# Patient Record
Sex: FEMALE | Race: WHITE | NOT HISPANIC OR LATINO | Employment: FULL TIME | URBAN - METROPOLITAN AREA
[De-identification: names, ages, dates, MRNs, and addresses within clinical notes are randomized per-mention and may not be internally consistent; named-entity substitution may affect disease eponyms.]

---

## 2019-06-11 ENCOUNTER — OFFICE VISIT (OUTPATIENT)
Dept: URGENT CARE | Age: 29
End: 2019-06-11
Payer: COMMERCIAL

## 2019-06-11 ENCOUNTER — APPOINTMENT (OUTPATIENT)
Dept: RADIOLOGY | Age: 29
End: 2019-06-11
Attending: FAMILY MEDICINE
Payer: COMMERCIAL

## 2019-06-11 ENCOUNTER — TELEPHONE (OUTPATIENT)
Dept: URGENT CARE | Age: 29
End: 2019-06-11

## 2019-06-11 ENCOUNTER — TRANSCRIBE ORDERS (OUTPATIENT)
Dept: URGENT CARE | Age: 29
End: 2019-06-11

## 2019-06-11 VITALS
TEMPERATURE: 98 F | HEIGHT: 67 IN | BODY MASS INDEX: 28.25 KG/M2 | DIASTOLIC BLOOD PRESSURE: 75 MMHG | HEART RATE: 106 BPM | RESPIRATION RATE: 18 BRPM | OXYGEN SATURATION: 97 % | SYSTOLIC BLOOD PRESSURE: 120 MMHG | WEIGHT: 180 LBS

## 2019-06-11 DIAGNOSIS — S39.92XA INJURY OF COCCYX, INITIAL ENCOUNTER: Primary | ICD-10-CM

## 2019-06-11 DIAGNOSIS — S39.92XA INJURY OF COCCYX, INITIAL ENCOUNTER: ICD-10-CM

## 2019-06-11 PROCEDURE — 99213 OFFICE O/P EST LOW 20 MIN: CPT | Performed by: FAMILY MEDICINE

## 2019-06-11 PROCEDURE — 72220 X-RAY EXAM SACRUM TAILBONE: CPT

## 2019-06-11 RX ORDER — NAPROXEN 500 MG/1
500 TABLET ORAL 2 TIMES DAILY WITH MEALS
Qty: 30 TABLET | Refills: 0 | Status: SHIPPED | OUTPATIENT
Start: 2019-06-11 | End: 2020-09-16

## 2020-06-23 PROBLEM — Z00.00 WELL ADULT EXAM: Status: ACTIVE | Noted: 2020-06-23

## 2020-10-01 ENCOUNTER — OFFICE VISIT (OUTPATIENT)
Dept: FAMILY MEDICINE CLINIC | Facility: CLINIC | Age: 30
End: 2020-10-01
Payer: COMMERCIAL

## 2020-10-01 VITALS
RESPIRATION RATE: 16 BRPM | TEMPERATURE: 98.5 F | HEIGHT: 67 IN | WEIGHT: 160 LBS | OXYGEN SATURATION: 96 % | SYSTOLIC BLOOD PRESSURE: 118 MMHG | HEART RATE: 78 BPM | DIASTOLIC BLOOD PRESSURE: 76 MMHG | BODY MASS INDEX: 25.11 KG/M2

## 2020-10-01 DIAGNOSIS — J02.9 SORE THROAT: ICD-10-CM

## 2020-10-01 DIAGNOSIS — G43.011 MIGRAINE WITHOUT AURA, WITH INTRACTABLE MIGRAINE, SO STATED, WITH STATUS MIGRAINOSUS: ICD-10-CM

## 2020-10-01 DIAGNOSIS — Z11.4 ENCOUNTER FOR SCREENING FOR HIV: ICD-10-CM

## 2020-10-01 DIAGNOSIS — Z13.220 LIPID SCREENING: ICD-10-CM

## 2020-10-01 DIAGNOSIS — Z13.1 DIABETES MELLITUS SCREENING: ICD-10-CM

## 2020-10-01 DIAGNOSIS — F41.1 ANXIETY STATE: ICD-10-CM

## 2020-10-01 DIAGNOSIS — M79.641 RIGHT HAND PAIN: Primary | ICD-10-CM

## 2020-10-01 PROCEDURE — 99214 OFFICE O/P EST MOD 30 MIN: CPT | Performed by: PHYSICIAN ASSISTANT

## 2020-10-01 PROCEDURE — 3725F SCREEN DEPRESSION PERFORMED: CPT | Performed by: PHYSICIAN ASSISTANT

## 2020-10-07 ENCOUNTER — EVALUATION (OUTPATIENT)
Dept: PHYSICAL THERAPY | Facility: REHABILITATION | Age: 30
End: 2020-10-07
Payer: COMMERCIAL

## 2020-10-07 DIAGNOSIS — M79.641 RIGHT HAND PAIN: ICD-10-CM

## 2020-10-07 DIAGNOSIS — G43.011 MIGRAINE WITHOUT AURA, WITH INTRACTABLE MIGRAINE, SO STATED, WITH STATUS MIGRAINOSUS: ICD-10-CM

## 2020-10-07 PROCEDURE — 97162 PT EVAL MOD COMPLEX 30 MIN: CPT | Performed by: PHYSICAL THERAPIST

## 2020-10-07 PROCEDURE — 97110 THERAPEUTIC EXERCISES: CPT | Performed by: PHYSICAL THERAPIST

## 2020-10-12 ENCOUNTER — OFFICE VISIT (OUTPATIENT)
Dept: PHYSICAL THERAPY | Facility: REHABILITATION | Age: 30
End: 2020-10-12
Payer: COMMERCIAL

## 2020-10-12 DIAGNOSIS — G43.011 MIGRAINE WITHOUT AURA, WITH INTRACTABLE MIGRAINE, SO STATED, WITH STATUS MIGRAINOSUS: ICD-10-CM

## 2020-10-12 DIAGNOSIS — M79.641 RIGHT HAND PAIN: Primary | ICD-10-CM

## 2020-10-12 PROCEDURE — 97110 THERAPEUTIC EXERCISES: CPT

## 2020-10-12 PROCEDURE — 97140 MANUAL THERAPY 1/> REGIONS: CPT

## 2020-11-04 ENCOUNTER — OFFICE VISIT (OUTPATIENT)
Dept: PHYSICAL THERAPY | Facility: REHABILITATION | Age: 30
End: 2020-11-04
Payer: COMMERCIAL

## 2020-11-04 DIAGNOSIS — M79.641 RIGHT HAND PAIN: Primary | ICD-10-CM

## 2020-11-04 DIAGNOSIS — G43.011 MIGRAINE WITHOUT AURA, WITH INTRACTABLE MIGRAINE, SO STATED, WITH STATUS MIGRAINOSUS: ICD-10-CM

## 2020-11-04 PROCEDURE — 97110 THERAPEUTIC EXERCISES: CPT | Performed by: PHYSICAL THERAPIST

## 2020-11-04 PROCEDURE — 97140 MANUAL THERAPY 1/> REGIONS: CPT | Performed by: PHYSICAL THERAPIST

## 2020-11-06 ENCOUNTER — OFFICE VISIT (OUTPATIENT)
Dept: PHYSICAL THERAPY | Facility: REHABILITATION | Age: 30
End: 2020-11-06
Payer: COMMERCIAL

## 2020-11-06 DIAGNOSIS — G43.011 MIGRAINE WITHOUT AURA, WITH INTRACTABLE MIGRAINE, SO STATED, WITH STATUS MIGRAINOSUS: ICD-10-CM

## 2020-11-06 DIAGNOSIS — M79.641 RIGHT HAND PAIN: Primary | ICD-10-CM

## 2020-11-06 PROCEDURE — 97110 THERAPEUTIC EXERCISES: CPT

## 2020-11-06 PROCEDURE — 97140 MANUAL THERAPY 1/> REGIONS: CPT

## 2020-11-10 ENCOUNTER — OFFICE VISIT (OUTPATIENT)
Dept: PHYSICAL THERAPY | Facility: REHABILITATION | Age: 30
End: 2020-11-10
Payer: COMMERCIAL

## 2020-11-10 DIAGNOSIS — M79.641 RIGHT HAND PAIN: Primary | ICD-10-CM

## 2020-11-10 DIAGNOSIS — G43.011 MIGRAINE WITHOUT AURA, WITH INTRACTABLE MIGRAINE, SO STATED, WITH STATUS MIGRAINOSUS: ICD-10-CM

## 2020-11-10 PROCEDURE — 97140 MANUAL THERAPY 1/> REGIONS: CPT

## 2020-11-10 PROCEDURE — 97110 THERAPEUTIC EXERCISES: CPT

## 2020-11-12 ENCOUNTER — OFFICE VISIT (OUTPATIENT)
Dept: OBGYN CLINIC | Facility: MEDICAL CENTER | Age: 30
End: 2020-11-12
Attending: PHYSICIAN ASSISTANT
Payer: COMMERCIAL

## 2020-11-12 VITALS
WEIGHT: 160 LBS | DIASTOLIC BLOOD PRESSURE: 82 MMHG | SYSTOLIC BLOOD PRESSURE: 122 MMHG | BODY MASS INDEX: 25.11 KG/M2 | HEIGHT: 67 IN | HEART RATE: 79 BPM | TEMPERATURE: 98.4 F

## 2020-11-12 DIAGNOSIS — G56.21 CUBITAL TUNNEL SYNDROME ON RIGHT: ICD-10-CM

## 2020-11-12 DIAGNOSIS — G56.01 CARPAL TUNNEL SYNDROME ON RIGHT: Primary | ICD-10-CM

## 2020-11-12 DIAGNOSIS — M79.641 RIGHT HAND PAIN: ICD-10-CM

## 2020-11-12 PROCEDURE — 3008F BODY MASS INDEX DOCD: CPT | Performed by: ORTHOPAEDIC SURGERY

## 2020-11-12 PROCEDURE — 99244 OFF/OP CNSLTJ NEW/EST MOD 40: CPT | Performed by: ORTHOPAEDIC SURGERY

## 2020-11-13 ENCOUNTER — OFFICE VISIT (OUTPATIENT)
Dept: PHYSICAL THERAPY | Facility: REHABILITATION | Age: 30
End: 2020-11-13
Payer: COMMERCIAL

## 2020-11-13 DIAGNOSIS — G43.011 MIGRAINE WITHOUT AURA, WITH INTRACTABLE MIGRAINE, SO STATED, WITH STATUS MIGRAINOSUS: ICD-10-CM

## 2020-11-13 DIAGNOSIS — M79.641 RIGHT HAND PAIN: Primary | ICD-10-CM

## 2020-11-13 PROCEDURE — 97110 THERAPEUTIC EXERCISES: CPT

## 2020-11-13 PROCEDURE — 97140 MANUAL THERAPY 1/> REGIONS: CPT

## 2020-11-17 ENCOUNTER — OFFICE VISIT (OUTPATIENT)
Dept: PHYSICAL THERAPY | Facility: REHABILITATION | Age: 30
End: 2020-11-17
Payer: COMMERCIAL

## 2020-11-17 DIAGNOSIS — G43.011 MIGRAINE WITHOUT AURA, WITH INTRACTABLE MIGRAINE, SO STATED, WITH STATUS MIGRAINOSUS: ICD-10-CM

## 2020-11-17 DIAGNOSIS — M79.641 RIGHT HAND PAIN: Primary | ICD-10-CM

## 2020-11-17 PROCEDURE — 97110 THERAPEUTIC EXERCISES: CPT | Performed by: PHYSICAL THERAPIST

## 2020-11-17 PROCEDURE — 97140 MANUAL THERAPY 1/> REGIONS: CPT | Performed by: PHYSICAL THERAPIST

## 2020-11-24 ENCOUNTER — OFFICE VISIT (OUTPATIENT)
Dept: PHYSICAL THERAPY | Facility: REHABILITATION | Age: 30
End: 2020-11-24
Payer: COMMERCIAL

## 2020-11-24 DIAGNOSIS — M79.641 RIGHT HAND PAIN: Primary | ICD-10-CM

## 2020-11-24 DIAGNOSIS — G43.011 MIGRAINE WITHOUT AURA, WITH INTRACTABLE MIGRAINE, SO STATED, WITH STATUS MIGRAINOSUS: ICD-10-CM

## 2020-11-24 PROCEDURE — 97140 MANUAL THERAPY 1/> REGIONS: CPT

## 2020-11-24 PROCEDURE — 97110 THERAPEUTIC EXERCISES: CPT

## 2020-12-01 ENCOUNTER — EVALUATION (OUTPATIENT)
Dept: PHYSICAL THERAPY | Facility: REHABILITATION | Age: 30
End: 2020-12-01
Payer: COMMERCIAL

## 2020-12-01 DIAGNOSIS — M79.641 RIGHT HAND PAIN: Primary | ICD-10-CM

## 2020-12-01 PROCEDURE — 97010 HOT OR COLD PACKS THERAPY: CPT | Performed by: PHYSICAL THERAPIST

## 2020-12-01 PROCEDURE — 97110 THERAPEUTIC EXERCISES: CPT | Performed by: PHYSICAL THERAPIST

## 2020-12-01 PROCEDURE — 97140 MANUAL THERAPY 1/> REGIONS: CPT | Performed by: PHYSICAL THERAPIST

## 2020-12-04 ENCOUNTER — OFFICE VISIT (OUTPATIENT)
Dept: PHYSICAL THERAPY | Facility: REHABILITATION | Age: 30
End: 2020-12-04
Payer: COMMERCIAL

## 2020-12-04 DIAGNOSIS — M79.641 RIGHT HAND PAIN: Primary | ICD-10-CM

## 2020-12-04 DIAGNOSIS — G43.011 MIGRAINE WITHOUT AURA, WITH INTRACTABLE MIGRAINE, SO STATED, WITH STATUS MIGRAINOSUS: ICD-10-CM

## 2020-12-04 PROCEDURE — 97140 MANUAL THERAPY 1/> REGIONS: CPT

## 2020-12-04 PROCEDURE — 97110 THERAPEUTIC EXERCISES: CPT

## 2021-01-12 ENCOUNTER — LAB (OUTPATIENT)
Dept: LAB | Facility: IMAGING CENTER | Age: 31
End: 2021-01-12
Payer: COMMERCIAL

## 2021-01-12 DIAGNOSIS — Z11.4 ENCOUNTER FOR SCREENING FOR HIV: ICD-10-CM

## 2021-01-12 LAB
ALBUMIN SERPL BCP-MCNC: 4 G/DL (ref 3.5–5)
ALP SERPL-CCNC: 43 U/L (ref 46–116)
ALT SERPL W P-5'-P-CCNC: 31 U/L (ref 12–78)
ANION GAP SERPL CALCULATED.3IONS-SCNC: 2 MMOL/L (ref 4–13)
AST SERPL W P-5'-P-CCNC: 14 U/L (ref 5–45)
BILIRUB SERPL-MCNC: 0.44 MG/DL (ref 0.2–1)
BUN SERPL-MCNC: 9 MG/DL (ref 5–25)
CALCIUM SERPL-MCNC: 8.7 MG/DL (ref 8.3–10.1)
CHLORIDE SERPL-SCNC: 110 MMOL/L (ref 100–108)
CHOLEST SERPL-MCNC: 137 MG/DL (ref 50–200)
CO2 SERPL-SCNC: 28 MMOL/L (ref 21–32)
CREAT SERPL-MCNC: 0.74 MG/DL (ref 0.6–1.3)
GFR SERPL CREATININE-BSD FRML MDRD: 110 ML/MIN/1.73SQ M
GLUCOSE P FAST SERPL-MCNC: 80 MG/DL (ref 65–99)
HDLC SERPL-MCNC: 51 MG/DL
LDLC SERPL CALC-MCNC: 77 MG/DL (ref 0–100)
NONHDLC SERPL-MCNC: 86 MG/DL
POTASSIUM SERPL-SCNC: 4.2 MMOL/L (ref 3.5–5.3)
PROT SERPL-MCNC: 6.9 G/DL (ref 6.4–8.2)
SODIUM SERPL-SCNC: 140 MMOL/L (ref 136–145)
TRIGL SERPL-MCNC: 47 MG/DL

## 2021-01-12 PROCEDURE — 80053 COMPREHEN METABOLIC PANEL: CPT | Performed by: PHYSICIAN ASSISTANT

## 2021-01-12 PROCEDURE — 87389 HIV-1 AG W/HIV-1&-2 AB AG IA: CPT

## 2021-01-12 PROCEDURE — 36415 COLL VENOUS BLD VENIPUNCTURE: CPT | Performed by: PHYSICIAN ASSISTANT

## 2021-01-12 PROCEDURE — 80061 LIPID PANEL: CPT | Performed by: PHYSICIAN ASSISTANT

## 2021-01-13 LAB — HIV 1+2 AB+HIV1 P24 AG SERPL QL IA: NORMAL

## 2021-01-14 ENCOUNTER — TELEPHONE (OUTPATIENT)
Dept: FAMILY MEDICINE CLINIC | Facility: CLINIC | Age: 31
End: 2021-01-14

## 2021-01-14 NOTE — TELEPHONE ENCOUNTER
----- Message from Gissell Baptiste PA-C sent at 1/14/2021  7:22 AM EST -----  Please let her know that her blood work is normal for kidneys, liver, blood sugar, cholesterol panel and HIV

## 2021-01-19 ENCOUNTER — TELEPHONE (OUTPATIENT)
Dept: FAMILY MEDICINE CLINIC | Facility: CLINIC | Age: 31
End: 2021-01-19

## 2021-01-19 NOTE — TELEPHONE ENCOUNTER
Message was left on 1/14/21 for pt stating BW was normal    I called pt and left another message for her to call office back- we left message on 1/14/21 regarding labs   (see 1/14/21 encounter)

## 2021-01-20 ENCOUNTER — TELEPHONE (OUTPATIENT)
Dept: FAMILY MEDICINE CLINIC | Facility: CLINIC | Age: 31
End: 2021-01-20

## 2021-01-20 DIAGNOSIS — Z12.4 PAP SMEAR FOR CERVICAL CANCER SCREENING: Primary | ICD-10-CM

## 2021-01-20 NOTE — TELEPHONE ENCOUNTER
Patient is probably talking about genetic testing  Please let her know that is not something that we do through this office  I would recommend she see [facility] gynecology, Dr Danielle García  She needs to get the name of the cancer of her father

## 2021-01-20 NOTE — TELEPHONE ENCOUNTER
Patient called asking for blood work to screen for cancer  She said there are labs she can get done   Mother has history of breast cancer and father "has a weird kind of cancer hard to pronounce"    She does not have a GYN currently    Insistent there are labs she can get done for this

## 2021-01-20 NOTE — TELEPHONE ENCOUNTER
Pt will  referral for gyn and check with insurance to see where she could go for genetic testing and if it is covered  She would like a CBC done because of bruising and hair loss   Please advise

## 2021-05-20 ENCOUNTER — TRANSCRIBE ORDERS (OUTPATIENT)
Dept: INTERNAL MEDICINE CLINIC | Facility: CLINIC | Age: 31
End: 2021-05-20

## 2021-05-20 DIAGNOSIS — J02.9 ACUTE PHARYNGITIS, UNSPECIFIED: Primary | ICD-10-CM

## 2021-05-27 ENCOUNTER — TELEPHONE (OUTPATIENT)
Dept: INTERNAL MEDICINE CLINIC | Facility: CLINIC | Age: 31
End: 2021-05-27

## 2021-05-27 NOTE — TELEPHONE ENCOUNTER
Patient was a no show to her ENT appt today- I called patient to r/s I left a voicemail for a call back

## 2021-07-01 ENCOUNTER — CONSULT (OUTPATIENT)
Dept: MULTI SPECIALTY CLINIC | Facility: CLINIC | Age: 31
End: 2021-07-01

## 2021-07-01 VITALS
DIASTOLIC BLOOD PRESSURE: 68 MMHG | BODY MASS INDEX: 25.58 KG/M2 | WEIGHT: 163 LBS | HEART RATE: 75 BPM | SYSTOLIC BLOOD PRESSURE: 118 MMHG | HEIGHT: 67 IN | TEMPERATURE: 97.5 F

## 2021-07-01 DIAGNOSIS — R09.89 THROAT TIGHTNESS: ICD-10-CM

## 2021-07-01 DIAGNOSIS — R13.10 DYSPHAGIA, UNSPECIFIED TYPE: Primary | ICD-10-CM

## 2021-07-01 DIAGNOSIS — R09.89 GLOBUS SENSATION: ICD-10-CM

## 2021-07-01 PROCEDURE — 99243 OFF/OP CNSLTJ NEW/EST LOW 30: CPT | Performed by: OTOLARYNGOLOGY

## 2021-07-01 NOTE — PROGRESS NOTES
[facility]  [facility]      Otolaryngology -- New Patient Visit    Lauren Oneil is a 35 y o  who presents with a chief complaint of trouble swallowing, tightness in throat x years, worse over the last 4 years  Pertinent elements of the history include:  Tricia Neri is a 36 y/o female new to ENT clinic with the complaint of tightness in her throat x years, approx 6  She states it has been worse over the last 4 years but has also taken up smoking since that time  She also admits to not drinking water as well as she should  Sleeps with mouth open  H/o seen by [facility] a few years back but nothing really came of that visit per pt  No significant h/o tonsillitis/strep  But does recall 2-3 episodes of pharyngitis in the last 4 years treated with oral antibiotics with temporary relief  On daily basis, feels like throat narrow in between soft palate and back of throat, but she also points to level 2 area as area of tightness  Has to take sprite b/c sometimes she has trouble swallowing certain foods  No problem with swallowing liquids  No regurgitation/vomiting  No trouble breathing  Has slight wheezing sec to smoking  No recent dentalwork  Denies imaging  When asked about heartburn, she states she Had a recent episode, but is not normal for her to have that  She does experience more symptoms with lying down  She denies frequent throat clearing, hoarseness, sore throat, nasal congestion, post nasal drip  Allergies   Allergen Reactions    No Known Allergies      Past Medical History:   Diagnosis Date    Anxiety      History reviewed  No pertinent surgical history  Family History   Problem Relation Age of Onset    Cancer Father         non hodgkins lymphoma    Cancer Paternal Grandmother     Cancer Paternal Grandfather      No current outpatient medications on file prior to visit       No current facility-administered medications on file prior to visit  Results reviewed; images from any scan have been personally reviewed:  Hospitalization 5/7/2021 for chest pain, negative EKG, neg cxr, cbcd, cmp      Physical exam:    /68 (BP Location: Left arm, Patient Position: Sitting, Cuff Size: Adult)   Pulse 75   Temp 97 5 °F (36 4 °C) (Temporal)   Ht 5' 7" (1 702 m)   Wt 73 9 kg (163 lb)   BMI 25 53 kg/m²     Constitutional:  Well developed, well nourished and groomed, in no acute distress  Cooperative  Eyes:  Extra-ocular movements intact, pupils equally round, the lids and conjunctivae are normal in appearance  Gaze-normal left and right  Nystagmus absent left and right  Head: Atraumatic, normocephalic with no lesions or palpable masses  Ears:  Auricles normal in appearance bilaterally, mastoid prominence non-tender  External Auditory Canal - Left - external auditory canal clear, no drainage observed, no edema noted in EAC, no exostoses present, no osteoma present, no tenderness noted  Right - external auditory canal is clear, no drainage observed, no edema noted in EAC, no exostoses present, no osteoma present, no tenderness noted  Otoscopic Exam - Tympanic Membrane - Left - intact and normal in appearance, no retraction of TM observed, no serous effusion observed, no evidence of tympanosclerosis  Right - intact and normal in appearance, no retraction of TM observed, no serous effusion observed, no evidence of tympanosclerosis  Nose/Sinuses:  External Inspection of the Nose - no deformities observed, no deviation of bone structure, no skin lesion present, no swelling present  Inspection of the nares - Left - nares are symmetric, no deviation of caudal portion of septum  Right - nares are symmetric, no deviation of caudal portion of septum  Nasal Mucosa - Left - no congestion observed, no mucosal lesion or mass present, no ulcerations observed   Right - no congestion observed, no mucosal lesion or mass present, no ulcerations observed  Nasal Septum - Cartilaginous Septum - midline, no bleeding noted, no crusting present, no perforation noted  Turbinates - Inferior - Left - no hypertrophy, no inflammation noted  Right - no hypertrophy, no inflammation noted  Middle - Left - no inflammation noted  Right - no inflammation noted  Oral Cavity:  Lips - Upper Lip - normal color, moist, no cracks or lesions  Lower Lip - normal color, moist, no cracks or lesions  Teeth - no loose teeth, no missing teeth  Gingiva - no bleeding observed, no inflammation present  Hard Palate - slightly narrow hard palate, no asymmetry observed, no torus present  Soft Palate - normal, no ulcers noted  Oropharynx - no uvular edema is observed, uvula is midline, no edema of posterior pharyngeal walls observed  Tongue - normal mobility, surfaces without fissures,leukoplakia, ulceration or masses, not enlarged, no pallor noted, no white patches present  Oral Mucosa - no masses, lesions, leukoplakia, scarring and normal Alessia's ducts, pink and moist, no discoloration noted  Tonsils -1+ b/l, no erythema or exudate, no hypertrophy, no ulcerations noted  No exudate  Floor of mouth- normal Warthin's ducts, no lesions, ulceration, leukoplakia or torus mandibularis  Salivary Glands - Submandibular Glands - Left - non-tender  Right - non-tender  Parotid Glands - Left - non-tender  Right - non-tender  Sublingual Salivary Glands - non-tender  Neck:  No visible or palpable cervical lesions or lymphadenopathy, thyroid gland is normal in size and symmetry and without masses, normal laryngeal elevation with swallowing   mild tenderness level 2, no palpable pathological adenopathy, subtle thyroid fullness  Cardiovascular:  Not examined  Respiratory:  Normal respiratory effort without evidence of retractions or use of accessory muscles  Integument:  Normal appearing without observed masses or lesions  Neurologic:  Cranial nerves II-XII grossly intact bilaterally   Normal gait   Psychiatric:  Alert and oriented to time, place and person, normal affect, normal speech  Procedures      Assessment:   1  Dysphagia, unspecified type     2  Globus sensation  Ambulatory Referral to Otolaryngology   3  Throat tightness         Orders  No orders of the defined types were placed in this encounter  Discussion/Plan:    1  Lisset Handy was reassured that overall Exam was normal and there is no evidence of pharyngitis  She does have some tenderness at level 2 which does not feel pathological   I do feel she should have a scope of her larynx to rule out any other concerning causes  She has a family history of cancer including non-Hodgkin's lymphoma and is concerned  Since her symptoms are worse with lying down, could also consider LPR as a diagnosis  Encouraged smoking cessation  Also advised to increase hydration  If scope is negative, would most likely recommend swallow study with speech therapy since she has issues with swallowing  She denies laryngospasm  Dictation software was used to dictate this note  It may contain errors with dictating incorrect words/spelling  Please contact provider directly for any questions  Thank you for allowing me to participate in the care of your patient

## 2021-07-02 ENCOUNTER — TELEPHONE (OUTPATIENT)
Dept: INTERNAL MEDICINE CLINIC | Facility: CLINIC | Age: 31
End: 2021-07-02

## 2021-07-02 NOTE — TELEPHONE ENCOUNTER
----- Message from Margo House PA-C sent at 7/1/2021  5:37 PM EDT -----  Can we see her in 1-2 weeks in the clinic as overbook for scope  See message below  Thanks    Major Mariscal  ----- Message -----  From: Elton Pate  Sent: 7/1/2021   4:32 PM EDT  To: Margo House PA-C    It's more appropriate for the patient to follow at the clinic because of her insurance  If you feel it is urgent  I will expedite her follow up  If it is something the clinic can't do we can reconsider  ----- Message -----  From: Margo House PA-C  Sent: 7/1/2021   4:24 PM EDT  To: Elton Pate    I gave pt our main number to contact office for appt since scope was not available today here b/c we used two and she wants this done ASAP, so can we call her to schedule appt? Stalin if she needs referral to our main office  Has Mobile      Major Maricsal

## 2021-07-08 ENCOUNTER — OFFICE VISIT (OUTPATIENT)
Dept: FAMILY MEDICINE CLINIC | Facility: CLINIC | Age: 31
End: 2021-07-08
Payer: COMMERCIAL

## 2021-07-08 VITALS
HEART RATE: 72 BPM | SYSTOLIC BLOOD PRESSURE: 118 MMHG | RESPIRATION RATE: 16 BRPM | BODY MASS INDEX: 25.15 KG/M2 | DIASTOLIC BLOOD PRESSURE: 80 MMHG | OXYGEN SATURATION: 98 % | TEMPERATURE: 98.4 F | WEIGHT: 160.2 LBS | HEIGHT: 67 IN

## 2021-07-08 DIAGNOSIS — F41.1 ANXIETY STATE: Primary | ICD-10-CM

## 2021-07-08 DIAGNOSIS — G56.01 RIGHT CARPAL TUNNEL SYNDROME: ICD-10-CM

## 2021-07-08 DIAGNOSIS — J02.9 SORE THROAT: ICD-10-CM

## 2021-07-08 PROCEDURE — 3725F SCREEN DEPRESSION PERFORMED: CPT | Performed by: PHYSICIAN ASSISTANT

## 2021-07-08 PROCEDURE — 99214 OFFICE O/P EST MOD 30 MIN: CPT | Performed by: PHYSICIAN ASSISTANT

## 2021-07-08 RX ORDER — BUSPIRONE HYDROCHLORIDE 5 MG/1
5 TABLET ORAL 2 TIMES DAILY
Qty: 180 TABLET | Refills: 0 | Status: SHIPPED | OUTPATIENT
Start: 2021-07-08 | End: 2022-03-11

## 2021-07-08 RX ORDER — HYDROXYZINE HYDROCHLORIDE 25 MG/1
TABLET, FILM COATED ORAL
Qty: 30 TABLET | Refills: 1 | Status: SHIPPED | OUTPATIENT
Start: 2021-07-08 | End: 2021-08-05 | Stop reason: SDUPTHER

## 2021-07-08 NOTE — PROGRESS NOTES
Assessment/Plan:     - follow-up with ENT as scheduled tomorrow for the scope  -start BuSpar 5 mg once daily for 3 days and then increase the medication to 1 tablet twice daily   - take hydroxyzine as needed  Advised the drowsy side effects  She states he notices most of her symptoms at nighttime so she will take the medication then  -advised to start counseling  -Phone number given again for hand surgery  -routine follow-up for anxiety in 1 month    M*Modal software was used to dictate this note  It may contain errors with dictating incorrect words/spelling  Please contact provider directly for any questions  Diagnoses and all orders for this visit:    Anxiety state  -     busPIRone (BUSPAR) 5 mg tablet; Take 1 tablet (5 mg total) by mouth 2 (two) times a day  -     hydrOXYzine HCL (ATARAX) 25 mg tablet; Take 1 tablet every 8 hours as needed for anxiety    Right carpal tunnel syndrome  -     Ambulatory referral to Hand Surgery; Future    Sore throat          Subjective:      Patient ID: Emily Piña is a 35 y o  female  Patient presents today for evaluation of anxiety symptoms that having increasing over the last 3 weeks or more  She states that she has been concerned about her health  She states that her mom told her to have blood work done for cancer  She did see the ENT specialist because of difficulty swallowing at times in is scheduled for a scope tomorrow  She states essentially she has difficulty just swallowing her saliva but not specifically foods or beverages  She was treated for anxiety years ago with an as-needed medication but she does not remember the name of the medicine  She is having symptoms daily  Denies any suicidal ideations  She needs the phone number again for the hand surgeon who she saw in November 2020  She was going to physical therapy for the carpal tunnel but has not really noticed much benefit and prefers to have surgery at this time        The following portions of the patient's history were reviewed and updated as appropriate:   She  has a past medical history of Anxiety  She   Patient Active Problem List    Diagnosis Date Noted    Right carpal tunnel syndrome 07/08/2021    Migraine without aura, with intractable migraine, so stated, with status migrainosus 10/01/2020    Sore throat 10/01/2020    Right hand pain 10/01/2020    Encounter for screening for HIV 10/01/2020    Lipid screening 10/01/2020    Diabetes mellitus screening 10/01/2020    Well adult exam 06/23/2020    Anxiety state 05/12/2015     She  has no past surgical history on file  Her family history includes Cancer in her father, paternal grandfather, and paternal grandmother  She  reports that she has been smoking cigarettes  She has smoked for the past 1 20 years  She has never used smokeless tobacco  She reports previous alcohol use  No history on file for drug use  Current Outpatient Medications   Medication Sig Dispense Refill    busPIRone (BUSPAR) 5 mg tablet Take 1 tablet (5 mg total) by mouth 2 (two) times a day 180 tablet 0    hydrOXYzine HCL (ATARAX) 25 mg tablet Take 1 tablet every 8 hours as needed for anxiety 30 tablet 1     No current facility-administered medications for this visit  No current outpatient medications on file prior to visit  No current facility-administered medications on file prior to visit  She is allergic to no known allergies       Review of Systems   Constitutional: Negative  Respiratory: Negative for cough and shortness of breath  Gastrointestinal: Negative for abdominal pain     Psychiatric/Behavioral:          As stated in HPI         Objective:      /80 (BP Location: Left arm, Patient Position: Sitting, Cuff Size: Standard)   Pulse 72   Temp 98 4 °F (36 9 °C) (Tympanic)   Resp 16   Ht 5' 7" (1 702 m)   Wt 72 7 kg (160 lb 3 2 oz)   SpO2 98%   BMI 25 09 kg/m²          Physical Exam  Constitutional:       General: She is not in acute distress  Appearance: Normal appearance  She is well-developed  She is not ill-appearing, toxic-appearing or diaphoretic  HENT:      Head: Normocephalic and atraumatic  Right Ear: External ear normal       Left Ear: External ear normal    Neck:      Thyroid: No thyromegaly  Cardiovascular:      Rate and Rhythm: Normal rate and regular rhythm  Heart sounds: Normal heart sounds  No murmur heard  No friction rub  No gallop  Pulmonary:      Effort: Pulmonary effort is normal  No respiratory distress  Breath sounds: Normal breath sounds  No wheezing, rhonchi or rales  Abdominal:      General: Bowel sounds are normal       Palpations: Abdomen is soft  There is no mass  Tenderness: There is no abdominal tenderness  Musculoskeletal:         General: No deformity  Cervical back: Neck supple  Lymphadenopathy:      Cervical: No cervical adenopathy  Skin:     General: Skin is warm  Neurological:      General: No focal deficit present  Mental Status: She is alert  Psychiatric:         Mood and Affect: Mood normal          Behavior: Behavior normal          Thought Content:  Thought content normal          Judgment: Judgment normal

## 2021-07-09 ENCOUNTER — OFFICE VISIT (OUTPATIENT)
Dept: MULTI SPECIALTY CLINIC | Facility: CLINIC | Age: 31
End: 2021-07-09

## 2021-07-09 VITALS
SYSTOLIC BLOOD PRESSURE: 137 MMHG | HEART RATE: 76 BPM | DIASTOLIC BLOOD PRESSURE: 88 MMHG | BODY MASS INDEX: 25.27 KG/M2 | WEIGHT: 161 LBS | HEIGHT: 67 IN

## 2021-07-09 DIAGNOSIS — R09.89 GLOBUS SENSATION: ICD-10-CM

## 2021-07-09 DIAGNOSIS — R13.10 DYSPHAGIA, UNSPECIFIED TYPE: Primary | ICD-10-CM

## 2021-07-09 DIAGNOSIS — R09.89 THROAT TIGHTNESS: ICD-10-CM

## 2021-07-09 DIAGNOSIS — K21.9 LARYNGOPHARYNGEAL REFLUX (LPR): ICD-10-CM

## 2021-07-09 DIAGNOSIS — K21.9 GASTROESOPHAGEAL REFLUX DISEASE, UNSPECIFIED WHETHER ESOPHAGITIS PRESENT: ICD-10-CM

## 2021-07-09 DIAGNOSIS — Z72.0 TOBACCO ABUSE: ICD-10-CM

## 2021-07-09 PROCEDURE — 99214 OFFICE O/P EST MOD 30 MIN: CPT | Performed by: OTOLARYNGOLOGY

## 2021-07-09 PROCEDURE — 3008F BODY MASS INDEX DOCD: CPT | Performed by: PHYSICIAN ASSISTANT

## 2021-07-09 PROCEDURE — 31575 DIAGNOSTIC LARYNGOSCOPY: CPT | Performed by: OTOLARYNGOLOGY

## 2021-07-09 RX ORDER — OMEPRAZOLE 40 MG/1
40 CAPSULE, DELAYED RELEASE ORAL DAILY
Qty: 30 CAPSULE | Refills: 2 | Status: SHIPPED | OUTPATIENT
Start: 2021-07-09 | End: 2021-09-13

## 2021-07-09 NOTE — PROGRESS NOTES
Diamond Prater is a 35 y  o female who presents for re-evaluation of dysphagia  No weight loss  Has a fam hx of lymphoma  Feels tight constantly  No known reflux but has multiple ED admissions and physician visits for non-cardiac chest pain  Has occasional burning reflux sxs  Is a vegetarian  No otalgia  No neck masses  Sometimes has problems with lymph node swelling with URI  Past Medical History:   Diagnosis Date    Anxiety        /88 (BP Location: Left arm, Patient Position: Sitting, Cuff Size: Adult)   Pulse 76   Ht 5' 7" (1 702 m)   Wt 73 kg (161 lb)   BMI 25 22 kg/m²       Physical Exam   Constitutional: Oriented to person, place, and time  Well-developed and well-nourished, no apparent distress, non-toxic appearance  Cooperative, able to hear and answer questions without difficulty  Voice: Normal voice quality  Head: Normocephalic, atraumatic  No scars, masses or lesions  Face: Symmetric, no edema, no sinus tenderness  Eyes: Vision grossly intact, extra-ocular movement intact  Ears: External ear normal   Bilateral tympanic membranes are intact with intact normal landmarks  No post-auricular erythema or tenderness  Nose: Septum midline, nares clear  Mucosa moist, turbinates well appearing  No crusting, polyps or discharge evident  Oral cavity: Dentition intact  Mucosa moist, lips normal   Tongue mobile, floor of mouth normal   Hard palate unremarkable  No masses or lesions  Oropharynx: Uvula is midline, soft palate normal   Unremarkable oropharyngeal inlet  Tonsils unremarkable  Posterior pharyngeal wall clear  No masses or lesions  Salivary glands:  Parotid glands and submandibular glands symmetric, no enlargement or tenderness  Neck: Normal laryngeal elevation with swallow  Trachea midline  No masses or lesions  No palpable adenopathy  Thyroid: normal in size, unremarkable without tenderness or palpable nodules  Pulmonary/Chest: Normal effort and rate   No respiratory distress  Musculoskeletal: Normal range of motion  Neurological: Cranial nerves 2-12 intact  Skin: Skin is warm and dry  Psychiatric: Normal mood and affect  Procedure: Flexible fiberoptic laryngoscopy    Indications: Evaluate areas of the upper aerodigestive tract not seen on physical exam    Procedure in detail: After informed verbal consent was obtained the nose was anesthetized using 4% lidocaine and neosynephrine spray  After adequate time for anesthesia the scope was guided easily along the nasal cavity floor and into the nasopharynx  The nasopharynx, oropharynx, hypopharynx and larynx were evaluated with the below listed findings  The scope was removed without difficulty and the patient tolerated the procedure well  Findings: No significant mucopurulence or polyposis in bilateral nasal cavities  No lesions or masses of the nasopharynx, oropharynx, hypopharynx, or larynx  Bilateral vocal cords are full mobile  Moderate erythema of true and false vocal cords  Moderate cobblestoning present at posterior hypopharynx  A/P: Laryngopharyngeal reflux: We discussed the ongoing findings of laryngopharyngeal reflux  We discussed the management of laryngopharyngeal reflux with PPI taken 30 minutes before the evening meal, elevation the head of bed, diet modifications, weight loss, and other lifestyle changes to assist with decreasing laryngopharyngeal reflux  Tobacco abuse: Discussed risks of ongoing cigarette smoking  Discussed the benefits of quitting immediately and prior to any surgery  Discussed options including support, quit date, medications, and family support  Patient voiced understanding and knowledge  Greater than 3 minutes were needed for discussion of tobacco dependence

## 2021-08-02 ENCOUNTER — OFFICE VISIT (OUTPATIENT)
Dept: OBGYN CLINIC | Facility: MEDICAL CENTER | Age: 31
End: 2021-08-02
Payer: COMMERCIAL

## 2021-08-02 VITALS — BODY MASS INDEX: 25.08 KG/M2 | HEIGHT: 67 IN | WEIGHT: 159.8 LBS

## 2021-08-02 DIAGNOSIS — R20.2 NUMBNESS AND TINGLING IN RIGHT HAND: Primary | ICD-10-CM

## 2021-08-02 DIAGNOSIS — R20.0 NUMBNESS AND TINGLING IN RIGHT HAND: Primary | ICD-10-CM

## 2021-08-02 PROCEDURE — 99213 OFFICE O/P EST LOW 20 MIN: CPT | Performed by: ORTHOPAEDIC SURGERY

## 2021-08-02 NOTE — PROGRESS NOTES
CHIEF COMPLAINT:  Chief Complaint   Patient presents with    Right Hand - Pain, Numbness, Tingling       SUBJECTIVE:  Madalyn Rodriguez is a 35y o  year old  female who presents to the office for evaluation of her Right hand  Pt has been having right sided numbness, tingling that affects all of her digits and forearm  Pt states that she also has recently been experiencing Right upper arm numbness  Pt was seen by Dr Donna Walker 11/12/2020 who diagnosed her with Right carpal tunnel and cubital tunnel  Pt states that she has attended therapy that did help at that time  Pt states that she has worn wrist braces in the past but no longer has night time symptoms  Pt states that she uses her sleeping position to avoid the symptoms instead of bracing at this point  She states that she is seeing a cardiologist tomorrow due to facial changes as well to evaluate for a possible stroke  PAST MEDICAL HISTORY:  Past Medical History:   Diagnosis Date    Anxiety        PAST SURGICAL HISTORY:  History reviewed  No pertinent surgical history      FAMILY HISTORY:  Family History   Problem Relation Age of Onset    Cancer Father         non hodgkins lymphoma    Cancer Paternal Grandmother     Cancer Paternal Grandfather        SOCIAL HISTORY:  Social History     Tobacco Use    Smoking status: Light Tobacco Smoker     Years: 1 20     Types: Cigarettes    Smokeless tobacco: Never Used    Tobacco comment: 8 cigarettes per day, No passive smoke exposure   Vaping Use    Vaping Use: Never used   Substance Use Topics    Alcohol use: Not Currently    Drug use: Not on file       MEDICATIONS:    Current Outpatient Medications:     hydrOXYzine HCL (ATARAX) 25 mg tablet, Take 1 tablet every 8 hours as needed for anxiety, Disp: 30 tablet, Rfl: 1    busPIRone (BUSPAR) 5 mg tablet, Take 1 tablet (5 mg total) by mouth 2 (two) times a day (Patient not taking: Reported on 8/2/2021), Disp: 180 tablet, Rfl: 0    omeprazole (PriLOSEC) 40 MG capsule, Take 1 capsule (40 mg total) by mouth daily (Patient not taking: Reported on 8/2/2021), Disp: 30 capsule, Rfl: 2    ALLERGIES:  Allergies   Allergen Reactions    No Known Allergies        REVIEW OF SYSTEMS:  Review of Systems   Constitutional: Negative for chills, fever and unexpected weight change  HENT: Negative for hearing loss, nosebleeds and sore throat  Eyes: Negative for pain, redness and visual disturbance  Respiratory: Negative for cough, shortness of breath and wheezing  Cardiovascular: Negative for chest pain, palpitations and leg swelling  Gastrointestinal: Negative for abdominal pain, nausea and vomiting  Endocrine: Negative for polydipsia and polyuria  Genitourinary: Negative for dysuria and hematuria  Skin: Negative for rash and wound  Neurological: Negative for dizziness and headaches  Psychiatric/Behavioral: Negative for decreased concentration, dysphoric mood and suicidal ideas  The patient is not nervous/anxious  VITALS:  There were no vitals filed for this visit  LABS:  HgA1c: No results found for: HGBA1C  BMP:   Lab Results   Component Value Date    CALCIUM 8 7 01/12/2021    K 4 2 01/12/2021    CO2 28 01/12/2021     (H) 01/12/2021    BUN 9 01/12/2021    CREATININE 0 74 01/12/2021       _____________________________________________________  PHYSICAL EXAMINATION:  General: well developed and well nourished, alert, oriented times 3 and appears comfortable  Psychiatric: Normal  HEENT: Trachea Midline, No torticollis  Pulmonary: No audible wheezing or strider  Cardiovascular: No discernable arrhythmia   Skin: No masses, erythema, lacerations, fluctation, ulcerations  Neurovascular: Sensation Intact to the Median, Ulnar, Radial Nerve, Motor Intact to the Median, Ulnar, Radial Nerve and Pulses Intact    MUSCULOSKELETAL EXAMINATION:  Right Carpal Tunnel Exam:    Negative thenar atrophy  Positive phalen's test  Positive carpal tunnel compression   Negative tinels over median nerve at the wrist   Opposition strength 5/5  Abduction strength 5/5       2 point discrimination is 4 mm throughout             ___________________________________________________  STUDIES REVIEWED:  No studies reviewed  PROCEDURES PERFORMED:  Procedures  No Procedures performed today    _____________________________________________________  ASSESSMENT/PLAN:    RUE numbness and tingling   RUE EDx was ordered  * Pt was advised to continue to follow up with PCP and Cardiologist as previously scheduled   *Pt will follow up in the office after EDx      Follow Up:  No follow-ups on file          To Do Next Visit:  Re-evaluation of current issue      Scribe Attestation    I,:  Chuck Casey am acting as a scribe while in the presence of the attending physician :       I,:  Ileana Angeles MD personally performed the services described in this documentation    as scribed in my presence :

## 2021-08-04 ENCOUNTER — TELEPHONE (OUTPATIENT)
Dept: FAMILY MEDICINE CLINIC | Facility: CLINIC | Age: 31
End: 2021-08-04

## 2021-08-04 NOTE — TELEPHONE ENCOUNTER
PT called and got her echo results on her mychart and has her appt tomorrow but would like someone to go over them with her today

## 2021-08-05 ENCOUNTER — OFFICE VISIT (OUTPATIENT)
Dept: FAMILY MEDICINE CLINIC | Facility: CLINIC | Age: 31
End: 2021-08-05
Payer: COMMERCIAL

## 2021-08-05 VITALS
BODY MASS INDEX: 25.08 KG/M2 | RESPIRATION RATE: 16 BRPM | WEIGHT: 159.8 LBS | HEIGHT: 67 IN | SYSTOLIC BLOOD PRESSURE: 126 MMHG | DIASTOLIC BLOOD PRESSURE: 80 MMHG | TEMPERATURE: 97.6 F | OXYGEN SATURATION: 96 % | HEART RATE: 82 BPM

## 2021-08-05 DIAGNOSIS — R20.2 RIGHT LEG PARESTHESIAS: Primary | ICD-10-CM

## 2021-08-05 DIAGNOSIS — F41.1 ANXIETY STATE: ICD-10-CM

## 2021-08-05 DIAGNOSIS — R20.2 ARM PARESTHESIA, RIGHT: ICD-10-CM

## 2021-08-05 PROCEDURE — 99213 OFFICE O/P EST LOW 20 MIN: CPT | Performed by: PHYSICIAN ASSISTANT

## 2021-08-05 PROCEDURE — 3008F BODY MASS INDEX DOCD: CPT | Performed by: PHYSICIAN ASSISTANT

## 2021-08-05 RX ORDER — HYDROXYZINE HYDROCHLORIDE 25 MG/1
TABLET, FILM COATED ORAL
Qty: 30 TABLET | Refills: 1 | Status: SHIPPED | OUTPATIENT
Start: 2021-08-05 | End: 2021-09-03 | Stop reason: SDUPTHER

## 2021-08-05 NOTE — PROGRESS NOTES
Assessment/Plan:     I did recommend an MRI of her brain with and without contrast   - she will follow-up with Neurology as scheduled in October in [facility]   -continue on hydroxyzine at bedtime  She discontinued the buspar   -advised to go to the ER with any increasing symptoms    M*Modal software was used to dictate this note  It may contain errors with dictating incorrect words/spelling  Please contact provider directly for any questions  Diagnoses and all orders for this visit:    Right leg paresthesias  -     MRI brain w wo contrast; Future  -     Ambulatory referral to Neurology; Future    Arm paresthesia, right  -     MRI brain w wo contrast; Future  -     Ambulatory referral to Neurology; Future    Anxiety state  -     hydrOXYzine HCL (ATARAX) 25 mg tablet; Take 1 tablet every 8 hours as needed for anxiety          Subjective:      Patient ID: Lamin Stone is a 35 y o  female  Patient presents today for follow-up for paresthesias right upper extremity and right lower extremity  She states that she recently saw the orthopedic surgeon for probable carpal tunnel of the right upper extremity but based on her paresthesias in her right leg also he suggest is she see a neurologist   She states she did make an appointment with [address] [place] [address] Neurology in the 1st available appointment was in October   In [place]  She states she has had the paresthesias of her right upper extremity for over a year  She now has paresthesias of her right lower extremity over the last several months  She does not notice any weakness in general though  Denies any family history of multiple sclerosis  she states that she discontinued the BuSpar because she does not feel as though she needs any medication during the day  She has been doing well with the anxiety on the hydroxyzine at bedtime  She recently saw the cardiologist for chest pain and an echo was ordered    This was done through [facility]   She did see the results on [facility] my chart  The following portions of the patient's history were reviewed and updated as appropriate:   She  has a past medical history of Anxiety  She   Patient Active Problem List    Diagnosis Date Noted    Right leg paresthesias 08/05/2021    Arm paresthesia, right 08/05/2021    Tobacco abuse 07/09/2021    Laryngopharyngeal reflux (LPR) 07/09/2021    Gastroesophageal reflux disease 07/09/2021    Right carpal tunnel syndrome 07/08/2021    Migraine without aura, with intractable migraine, so stated, with status migrainosus 10/01/2020    Sore throat 10/01/2020    Right hand pain 10/01/2020    Encounter for screening for HIV 10/01/2020    Lipid screening 10/01/2020    Diabetes mellitus screening 10/01/2020    Well adult exam 06/23/2020    Anxiety state 05/12/2015     She  has no past surgical history on file  Her family history includes Cancer in her father, paternal grandfather, and paternal grandmother  She  reports that she has been smoking cigarettes  She has smoked for the past 1 20 years  She has never used smokeless tobacco  She reports previous alcohol use  No history on file for drug use  Current Outpatient Medications   Medication Sig Dispense Refill    hydrOXYzine HCL (ATARAX) 25 mg tablet Take 1 tablet every 8 hours as needed for anxiety 30 tablet 1    busPIRone (BUSPAR) 5 mg tablet Take 1 tablet (5 mg total) by mouth 2 (two) times a day (Patient not taking: Reported on 8/2/2021) 180 tablet 0    omeprazole (PriLOSEC) 40 MG capsule Take 1 capsule (40 mg total) by mouth daily (Patient not taking: Reported on 8/2/2021) 30 capsule 2     No current facility-administered medications for this visit       Current Outpatient Medications on File Prior to Visit   Medication Sig    [DISCONTINUED] hydrOXYzine HCL (ATARAX) 25 mg tablet Take 1 tablet every 8 hours as needed for anxiety    busPIRone (BUSPAR) 5 mg tablet Take 1 tablet (5 mg total) by mouth 2 (two) times a day (Patient not taking: Reported on 8/2/2021)    omeprazole (PriLOSEC) 40 MG capsule Take 1 capsule (40 mg total) by mouth daily (Patient not taking: Reported on 8/2/2021)     No current facility-administered medications on file prior to visit  She is allergic to no known allergies       Review of Systems   Constitutional: Negative  Cardiovascular: Positive for chest pain  Neurological:          As stated in HPI         Objective:      /80 (BP Location: Left arm, Patient Position: Sitting, Cuff Size: Standard)   Pulse 82   Temp 97 6 °F (36 4 °C) (Tympanic)   Resp 16   Ht 5' 7" (1 702 m)   Wt 72 5 kg (159 lb 12 8 oz)   SpO2 96%   BMI 25 03 kg/m²          Physical Exam  Constitutional:       General: She is not in acute distress  Appearance: Normal appearance  She is not ill-appearing, toxic-appearing or diaphoretic  Comments:   She did have to recommend her to son's on multiple occasions during the office visit because they were not sitting quietly in the exam room   HENT:      Head: Normocephalic and atraumatic  Cardiovascular:      Rate and Rhythm: Normal rate and regular rhythm  Heart sounds: No murmur heard  Pulmonary:      Effort: Pulmonary effort is normal  No respiratory distress  Breath sounds: No wheezing, rhonchi or rales  Musculoskeletal:      Cervical back: Neck supple  Comments:  Upper extremities and lower extremities:  No obvious atrophy  She does have good strength 5+/ 5 and equal bilaterally  She does have decreased sensation with light touch of right hand and right foot digits  Neurological:      General: No focal deficit present  Mental Status: She is alert  Psychiatric:         Mood and Affect: Mood normal          Behavior: Behavior normal          Thought Content:  Thought content normal          Judgment: Judgment normal

## 2021-08-06 ENCOUNTER — TELEPHONE (OUTPATIENT)
Dept: NEUROLOGY | Facility: CLINIC | Age: 31
End: 2021-08-06

## 2021-08-06 NOTE — TELEPHONE ENCOUNTER
Best contact number for patient:     confirmed    Emergency Contact name and number:    Referring provider and telephone number:     PCP    Primary Care Provider Name and if affiliated with [facility]:       [place] PCP    Reason for Appointment/Dx:     R side paresthesias    Have you seen and followed up with a pediatric Neurologist for this disease in the past?      NO   (If yes ok to schedule with Dr Nate Lambert)    Neurology Location patient would like to be seen:    Order received? YES                                                 Records Received? PCP notes    Have you ever seen another Neurologist?       [address] [place] [address] Name:     Aisha Vincent    ID/Policy #:    Secondary Insurance:    ID/Policy#: Workman's Comp/ Accident/ School  Information      Workman's Comp/Accident/School related?        NO  If yes name of Insurance company:    Claim #:    Date of Injury:    Type of Injury:     Name and Telephone Number:    Notes:                   Appointment date:   Consult sched for:  Tuesday 1/25/22  1030  [place] maldonado PGY1 resident  [place] [facility] sooner  Assigned ref

## 2021-08-30 ENCOUNTER — HOSPITAL ENCOUNTER (OUTPATIENT)
Dept: RADIOLOGY | Facility: HOSPITAL | Age: 31
Discharge: HOME | End: 2021-08-30
Payer: COMMERCIAL

## 2021-08-30 DIAGNOSIS — R20.2 RIGHT LEG PARESTHESIAS: ICD-10-CM

## 2021-08-30 DIAGNOSIS — R20.2 ARM PARESTHESIA, RIGHT: ICD-10-CM

## 2021-08-30 PROCEDURE — G1004 CDSM NDSC: HCPCS

## 2021-08-30 PROCEDURE — A9585 GADOBUTROL INJECTION: HCPCS | Performed by: PHYSICIAN ASSISTANT

## 2021-08-30 PROCEDURE — 70553 MRI BRAIN STEM W/O & W/DYE: CPT

## 2021-08-30 RX ADMIN — GADOBUTROL 7 ML: 604.72 INJECTION INTRAVENOUS at 20:03

## 2021-09-02 ENCOUNTER — CONSULT (OUTPATIENT)
Dept: NEUROLOGY | Facility: CLINIC | Age: 31
End: 2021-09-02
Payer: COMMERCIAL

## 2021-09-02 VITALS
WEIGHT: 163.4 LBS | BODY MASS INDEX: 25.65 KG/M2 | SYSTOLIC BLOOD PRESSURE: 137 MMHG | DIASTOLIC BLOOD PRESSURE: 108 MMHG | HEIGHT: 67 IN | HEART RATE: 91 BPM

## 2021-09-02 DIAGNOSIS — G89.29 CHRONIC HEADACHES: Primary | ICD-10-CM

## 2021-09-02 DIAGNOSIS — R51.9 CHRONIC HEADACHES: Primary | ICD-10-CM

## 2021-09-02 DIAGNOSIS — R20.2 RIGHT LEG PARESTHESIAS: ICD-10-CM

## 2021-09-02 DIAGNOSIS — G56.01 RIGHT CARPAL TUNNEL SYNDROME: ICD-10-CM

## 2021-09-02 DIAGNOSIS — R20.2 ARM PARESTHESIA, RIGHT: ICD-10-CM

## 2021-09-02 PROCEDURE — 99244 OFF/OP CNSLTJ NEW/EST MOD 40: CPT | Performed by: PSYCHIATRY & NEUROLOGY

## 2021-09-02 PROCEDURE — 3008F BODY MASS INDEX DOCD: CPT | Performed by: PSYCHIATRY & NEUROLOGY

## 2021-09-02 RX ORDER — PROPRANOLOL HYDROCHLORIDE 20 MG/1
20 TABLET ORAL EVERY 12 HOURS SCHEDULED
Qty: 60 TABLET | Refills: 1 | Status: SHIPPED | OUTPATIENT
Start: 2021-09-02 | End: 2021-11-04

## 2021-09-02 NOTE — PROGRESS NOTES
Patient ID: Maddy Iglesias is a 35 y o  female  Assessment/Plan:    Arm paresthesia, right  This patient comes in with intermittent symptoms of numbness in the right upper and lower extremities, does have some neck pain and myofascial tenderness, numbness in the right upper extremity somewhat in the C5-6 distribution, and may have underlying carpal tunnel  In addition, reports numbness in the right lower extremity, exam was normal today, I did not appreciate any focal weakness or asymmetry on her reflexes  Reassured patient that her symptoms are not suggestive of a stroke, or a central demyelinating disease such as multiple sclerosis, considering the normal MRI  She does get almost daily headaches, most of these are tension headaches, I did ask her to pay attention in these paresthesias happen in relation to her headaches, questioning complex migraines  I did offer her medications for her daily headaches, will try vitamin B2 and magnesium, along with propranolol 20 mg twice a day  We did discuss the headache triggers, including lack of sleep, missing meals, smoking, lack of exercise, she is going to try to make adjustments to her lifestyle, and try to find any additional triggers for her  Also willing to try physical therapy, she is scheduled to get electrodiagnostic studies in the near future for the right upper extremity, will include the right lower extremity to evaluate for any focal neuropathy versus radiculopathy  Follow-up in 2-3 months  Diagnoses and all orders for this visit:    Chronic headaches  -     propranolol (INDERAL) 20 mg tablet; Take 1 tablet (20 mg total) by mouth every 12 (twelve) hours  -     Ambulatory referral to Physical Therapy; Future    Right leg paresthesias  -     Ambulatory referral to Neurology  -     Ambulatory referral to Physical Therapy;  Future    Arm paresthesia, right  -     Ambulatory referral to Neurology  -     Ambulatory referral to Physical Therapy; Future    Right carpal tunnel syndrome           Subjective:    HPI      I had the pleasure of seeing your patient in Neurology Clinic for neuromuscular consultation  As you know, patient is a 69-year-old woman who was referred for evaluation for right-sided paresthesias  Please allow me to summarize her history for the record  Patient reports she had a fall about 2 years ago, she missed a step, and landed on her coccyx  Since then, she did have pain in her lower back  Sometime after this injury, she started noticing numbness in the right hand  It was initially predominantly on the volar aspect of the lower right wrist, that would radiate up her forearm, however over the last 2 years, it has evolved some and would go all the way to include her whole right upper extremity  She does not have any neck pain, but does report tightness in her neck, did try physical therapy for a brief period which was somewhat helpful however symptoms returned  She feels her right arm is not as strong as it used to be  Was evaluated by Orthopedics,  Thought to have carpal tunnel syndrome, was given a wrist brace which she felt made her symptoms worse  She is scheduled for an EMG in the near future  In addition, started noticing numbness in the right lower extremity, which has been more prominent for her over the last 1 year  She denies any radicular symptoms in the right leg, numbness is mainly in the sole, along the medial malleolus, and anteromedial aspect of the left lower leg  Along with this, she reports her right side of the face does not feel normal   She feels her smile is not symmetric, and sometimes a sensation on the right side of the face is normal    Swallowing was affected sometime in the last 2 years, was evaluated by ENT, noted to have reflex  Speech is normal   She denies any visual changes    She does get headaches almost on a daily basis, most of these are tension headaches, she does not think her usual Tylenol or Motrin have been helpful  She has been more anxious, more recently started Atarax, was also prescribed BuSpar which she did not take  No family history of any neurological diseases  She is not a smoker, does not drink alcohol, family history is positive for coronary artery disease  MRI of the brain was performed this past week, images were personally reviewed by me as a part of this evaluation, it was a normal study  No demyelinating lesions, no stroke, no other pathology  The following portions of the patient's history were reviewed and updated as appropriate: allergies, current medications, past family history, past medical history, past social history, past surgical history and problem list          Objective:    Blood pressure (!) 137/108, pulse 91, height 5' 7" (1 702 m), weight 74 1 kg (163 lb 6 4 oz)  Physical Exam   On general examination, patient was not in any acute distress  HEENT was unremarkable  Extremities did not reveal any edema  Neurological Exam   Neurologically, patient is awake and alert  Speech was fluent without any dysarthria or aphasia  Cranial nerve examination did not reveal any facial asymmetry, normal extraocular movements, normal facial sensations bilaterally, she was able to puff out her cheeks well, and push her tongue into her cheeks well  On motor examination, tone was normal, no muscle atrophy, fasciculations, Tinel's was negative  Does have myofascial tenderness  Muscle strength was normal in neck flexors, extensors, and all muscle groups in both upper and lower extremities, proximally and distally  Reflexes were 2+ throughout, and symmetric  Sensory examination was normal in both upper and lower extremities, proximally and distally to all modalities  Gait was casual and normal       ROS:    Review of Systems   Constitutional: Negative  Negative for appetite change and fever  HENT: Positive for trouble swallowing  Negative for hearing loss, tinnitus and voice change  Eyes: Negative  Negative for photophobia and pain  Respiratory: Negative  Negative for shortness of breath  Cardiovascular: Positive for palpitations  Gastrointestinal: Negative  Negative for nausea and vomiting  Endocrine: Negative  Negative for cold intolerance  Genitourinary: Negative  Negative for dysuria, frequency and urgency  Musculoskeletal: Negative  Negative for myalgias and neck pain  Skin: Negative  Negative for rash  Neurological: Positive for facial asymmetry, speech difficulty, weakness (Slighly right), numbness (Right-side) and headaches  Negative for dizziness, tremors, seizures, syncope and light-headedness  Hematological: Bruises/bleeds easily  Psychiatric/Behavioral: Negative  Negative for confusion, hallucinations and sleep disturbance

## 2021-09-03 DIAGNOSIS — F41.1 ANXIETY STATE: ICD-10-CM

## 2021-09-03 RX ORDER — HYDROXYZINE HYDROCHLORIDE 25 MG/1
TABLET, FILM COATED ORAL
Qty: 30 TABLET | Refills: 0 | Status: SHIPPED | OUTPATIENT
Start: 2021-09-03 | End: 2021-09-13 | Stop reason: SDUPTHER

## 2021-09-03 NOTE — ASSESSMENT & PLAN NOTE
This patient comes in with intermittent symptoms of numbness in the right upper and lower extremities, does have some neck pain and myofascial tenderness, numbness in the right upper extremity somewhat in the C5-6 distribution, and may have underlying carpal tunnel  In addition, reports numbness in the right lower extremity, exam was normal today, I did not appreciate any focal weakness or asymmetry on her reflexes  Reassured patient that her symptoms are not suggestive of a stroke, or a central demyelinating disease such as multiple sclerosis, considering the normal MRI  She does get almost daily headaches, most of these are tension headaches, I did ask her to pay attention in these paresthesias happen in relation to her headaches, questioning complex migraines  I did offer her medications for her daily headaches, will try vitamin B2 and magnesium, along with propranolol 20 mg twice a day  We did discuss the headache triggers, including lack of sleep, missing meals, smoking, lack of exercise, she is going to try to make adjustments to her lifestyle, and try to find any additional triggers for her  Also willing to try physical therapy, she is scheduled to get electrodiagnostic studies in the near future for the right upper extremity, will include the right lower extremity to evaluate for any focal neuropathy versus radiculopathy  Follow-up in 2-3 months

## 2021-09-10 ENCOUNTER — HOSPITAL ENCOUNTER (OUTPATIENT)
Dept: NEUROLOGY | Facility: CLINIC | Age: 31
Discharge: HOME | End: 2021-09-10
Payer: COMMERCIAL

## 2021-09-10 DIAGNOSIS — R20.2 NUMBNESS AND TINGLING IN RIGHT HAND: ICD-10-CM

## 2021-09-10 DIAGNOSIS — R20.0 NUMBNESS AND TINGLING IN RIGHT HAND: ICD-10-CM

## 2021-09-10 PROCEDURE — 95886 MUSC TEST DONE W/N TEST COMP: CPT | Performed by: PSYCHIATRY & NEUROLOGY

## 2021-09-10 PROCEDURE — 95909 NRV CNDJ TST 5-6 STUDIES: CPT | Performed by: PSYCHIATRY & NEUROLOGY

## 2021-09-13 ENCOUNTER — OFFICE VISIT (OUTPATIENT)
Dept: FAMILY MEDICINE CLINIC | Facility: CLINIC | Age: 31
End: 2021-09-13
Payer: COMMERCIAL

## 2021-09-13 VITALS
OXYGEN SATURATION: 98 % | DIASTOLIC BLOOD PRESSURE: 60 MMHG | HEIGHT: 67 IN | TEMPERATURE: 98.3 F | WEIGHT: 166.6 LBS | SYSTOLIC BLOOD PRESSURE: 110 MMHG | BODY MASS INDEX: 26.15 KG/M2 | HEART RATE: 87 BPM | RESPIRATION RATE: 16 BRPM

## 2021-09-13 DIAGNOSIS — F41.1 ANXIETY STATE: ICD-10-CM

## 2021-09-13 DIAGNOSIS — G43.011 MIGRAINE WITHOUT AURA, WITH INTRACTABLE MIGRAINE, SO STATED, WITH STATUS MIGRAINOSUS: Primary | ICD-10-CM

## 2021-09-13 DIAGNOSIS — M26.621 ARTHRALGIA OF RIGHT TEMPOROMANDIBULAR JOINT: ICD-10-CM

## 2021-09-13 DIAGNOSIS — G56.01 CARPAL TUNNEL SYNDROME OF RIGHT WRIST: ICD-10-CM

## 2021-09-13 DIAGNOSIS — M54.2 NECK PAIN: ICD-10-CM

## 2021-09-13 PROCEDURE — 3008F BODY MASS INDEX DOCD: CPT | Performed by: PHYSICIAN ASSISTANT

## 2021-09-13 PROCEDURE — 99214 OFFICE O/P EST MOD 30 MIN: CPT | Performed by: PHYSICIAN ASSISTANT

## 2021-09-13 RX ORDER — HYDROXYZINE HYDROCHLORIDE 25 MG/1
TABLET, FILM COATED ORAL
Qty: 90 TABLET | Refills: 0 | Status: SHIPPED | OUTPATIENT
Start: 2021-09-13 | End: 2021-11-03 | Stop reason: SDUPTHER

## 2021-09-13 NOTE — PROGRESS NOTES
Assessment/Plan:      Neurology notes along with her recent EMG results has been reviewed  - she will continue her night splint on the right side for mild carpal tunnel syndrome   - I did recommend physical therapy for her frequent migraines, TMJ and neck pain   -apply heat to jaw 3 times daily for 10 minutes as needed   - importance of doing the exercises daily at home has been discussed along with improving posture   - continue hydroxyzine at bedtime since it is beneficial for her anxiety  She continues to hold the BuSpar   - recommend follow-up in 3 months, sooner if symptoms increase    M*Modal software was used to dictate this note  It may contain errors with dictating incorrect words/spelling  Please contact provider directly for any questions  BMI Counseling: Body mass index is 26 09 kg/m²  The BMI is above normal  Nutrition recommendations include reducing portion sizes and decreasing overall calorie intake  Exercise recommendations include exercising 3-5 times per week  Diagnoses and all orders for this visit:    Migraine without aura, with intractable migraine, so stated, with status migrainosus  -     Ambulatory referral to Physical Therapy; Future    Arthralgia of right temporomandibular joint  -     Ambulatory referral to Physical Therapy; Future    Neck pain  -     Ambulatory referral to Physical Therapy; Future    Anxiety state  -     hydrOXYzine HCL (ATARAX) 25 mg tablet; Take 1 tablet every 8 hours as needed for anxiety    Carpal tunnel syndrome of right wrist          Subjective:      Patient ID: Chika Celeste is a 35 y o  female  Patient presents today for routine follow-up for TMJ problems  She states that she feels as though her jaw is out of place  She was very pleased with the visit with her neurologist regarding the numbness on 1 side  She did have an EMG of her right upper extremity which did reveal mild carpal tunnel syndrome    She states initially she was wearing her night splint too tight which was causing more issues but she states over the past 2 nights her numbness is gone upon awakening in the morning  She does notice neck pain also  She was wondering if she needed to see an oral surgeon for her jaw  She denies any locking of her jaw  She does notice discomfort on the right side  She also gets frequent migraines  She did not schedule the therapy yet for her migraines  The following portions of the patient's history were reviewed and updated as appropriate:   She  has a past medical history of Anxiety  She   Patient Active Problem List    Diagnosis Date Noted    Arthralgia of right temporomandibular joint 09/13/2021    Neck pain 09/13/2021    Numbness and tingling in right hand     Chronic headaches 09/02/2021    Right leg paresthesias 08/05/2021    Arm paresthesia, right 08/05/2021    Tobacco abuse 07/09/2021    Laryngopharyngeal reflux (LPR) 07/09/2021    Gastroesophageal reflux disease 07/09/2021    Carpal tunnel syndrome of right wrist 07/08/2021    Migraine without aura, with intractable migraine, so stated, with status migrainosus 10/01/2020    Sore throat 10/01/2020    Right hand pain 10/01/2020    Encounter for screening for HIV 10/01/2020    Lipid screening 10/01/2020    Diabetes mellitus screening 10/01/2020    Well adult exam 06/23/2020    Anxiety state 05/12/2015     She  has no past surgical history on file  Her family history includes Cancer in her father, paternal grandfather, and paternal grandmother  She  reports that she has been smoking cigarettes  She has smoked for the past 1 20 years  She has never used smokeless tobacco  She reports previous alcohol use  No history on file for drug use    Current Outpatient Medications   Medication Sig Dispense Refill    hydrOXYzine HCL (ATARAX) 25 mg tablet Take 1 tablet every 8 hours as needed for anxiety 90 tablet 0    busPIRone (BUSPAR) 5 mg tablet Take 1 tablet (5 mg total) by mouth 2 (two) times a day (Patient not taking: Reported on 8/2/2021) 180 tablet 0    propranolol (INDERAL) 20 mg tablet Take 1 tablet (20 mg total) by mouth every 12 (twelve) hours (Patient not taking: Reported on 9/13/2021) 60 tablet 1     No current facility-administered medications for this visit  Current Outpatient Medications on File Prior to Visit   Medication Sig    [DISCONTINUED] hydrOXYzine HCL (ATARAX) 25 mg tablet Take 1 tablet every 8 hours as needed for anxiety    busPIRone (BUSPAR) 5 mg tablet Take 1 tablet (5 mg total) by mouth 2 (two) times a day (Patient not taking: Reported on 8/2/2021)    propranolol (INDERAL) 20 mg tablet Take 1 tablet (20 mg total) by mouth every 12 (twelve) hours (Patient not taking: Reported on 9/13/2021)    [DISCONTINUED] omeprazole (PriLOSEC) 40 MG capsule Take 1 capsule (40 mg total) by mouth daily (Patient not taking: Reported on 8/2/2021)     No current facility-administered medications on file prior to visit  She is allergic to no known allergies       Review of Systems   HENT:        As stated in HPI   Musculoskeletal:        As stated in HPI   Neurological:        As stated in HPI   Psychiatric/Behavioral:        Anxiety is doing well at this time with nighttime hydroxyzine  She denies symptoms during the day so she has not been taking the BuSpar  Objective:      /60   Pulse 87   Temp 98 3 °F (36 8 °C)   Resp 16   Ht 5' 7" (1 702 m)   Wt 75 6 kg (166 lb 9 6 oz)   SpO2 98%   BMI 26 09 kg/m²          Physical Exam  Constitutional:       General: She is not in acute distress  Appearance: Normal appearance  She is not ill-appearing, toxic-appearing or diaphoretic  HENT:      Head: Normocephalic and atraumatic  Mouth/Throat:      Comments: Tenderness of right TMJ  Musculoskeletal:      Comments: Cervical spine:  She does have a forward posture on the right side  She does notice pain with rotation to the right     Skin: General: Skin is warm  Neurological:      General: No focal deficit present  Mental Status: She is alert  Psychiatric:         Mood and Affect: Mood normal          Behavior: Behavior normal          Thought Content:  Thought content normal          Judgment: Judgment normal

## 2021-09-20 ENCOUNTER — EVALUATION (OUTPATIENT)
Dept: PHYSICAL THERAPY | Facility: REHABILITATION | Age: 31
End: 2021-09-20
Payer: COMMERCIAL

## 2021-09-20 DIAGNOSIS — R20.2 ARM PARESTHESIA, RIGHT: ICD-10-CM

## 2021-09-20 DIAGNOSIS — G89.29 CHRONIC HEADACHES: ICD-10-CM

## 2021-09-20 DIAGNOSIS — R51.9 CRANIOFACIAL PAIN: Primary | ICD-10-CM

## 2021-09-20 DIAGNOSIS — G89.29 CHRONIC NONINTRACTABLE HEADACHE, UNSPECIFIED HEADACHE TYPE: ICD-10-CM

## 2021-09-20 DIAGNOSIS — R51.9 CHRONIC NONINTRACTABLE HEADACHE, UNSPECIFIED HEADACHE TYPE: ICD-10-CM

## 2021-09-20 DIAGNOSIS — M54.2 NECK PAIN: ICD-10-CM

## 2021-09-20 DIAGNOSIS — R20.2 RIGHT LEG PARESTHESIAS: ICD-10-CM

## 2021-09-20 DIAGNOSIS — M26.621 ARTHRALGIA OF RIGHT TEMPOROMANDIBULAR JOINT: ICD-10-CM

## 2021-09-20 DIAGNOSIS — R51.9 CHRONIC HEADACHES: ICD-10-CM

## 2021-09-20 DIAGNOSIS — G43.011 MIGRAINE WITHOUT AURA, WITH INTRACTABLE MIGRAINE, SO STATED, WITH STATUS MIGRAINOSUS: ICD-10-CM

## 2021-09-20 PROCEDURE — 97110 THERAPEUTIC EXERCISES: CPT | Performed by: PHYSICAL THERAPIST

## 2021-09-20 PROCEDURE — 97162 PT EVAL MOD COMPLEX 30 MIN: CPT | Performed by: PHYSICAL THERAPIST

## 2021-09-20 NOTE — PROGRESS NOTES
PT Evaluation     Today's date: 2021  Patient name: Sho Bass  : 1990  MRN: [id number]  Referring provider: Marci Galvan MD  Dx:   Encounter Diagnosis     ICD-10-CM    1  Craniofacial pain  R51 9    2  Right leg paresthesias  R20 2 Ambulatory referral to Physical Therapy   3  Arm paresthesia, right  R20 2 Ambulatory referral to Physical Therapy   4  Chronic headaches  R51 9 Ambulatory referral to Physical Therapy    G89 29    5  Migraine without aura, with intractable migraine, so stated, with status migrainosus  G43 011 Ambulatory referral to Physical Therapy   6  Arthralgia of right temporomandibular joint  M26 621 Ambulatory referral to Physical Therapy   7  Neck pain  M54 2 Ambulatory referral to Physical Therapy                  Assessment  Assessment details: Pt is a 36 yo female with headache and TMJ pain  Last year she was treated in this office for carpal tunnel syndrome and neck pain without full resolution of symptoms  Today she presents with ongoing headache pain and right jaw pain which began this spring  Her right TMJ is tender and she presents with increased muscle tone and tenderness  Her posture is fair to poor and her kiran neck flexor strength is poor  She notes that she has high anxiety levels and that she clenches her teeth frequently  She would benefit from breathing exercises and TMJ/tongue up positioning changes to reduce stress and clenching and exercises to improve postural awareness and stability  It was suggested to the patient that she also try a yoga class      Impairments: abnormal muscle firing, abnormal muscle tone, lacks appropriate home exercise program, pain with function, poor posture  and poor body mechanics    Symptom irritability: highUnderstanding of Dx/Px/POC: excellent  Goals  STG: in 4 weeks  Pt performing HEP consistently  Pt looks into a yoga class  Pt catches herself clenching her teeth and unclenches  LTG: by D/C  Pt able to eat without pain  Pt able to concentrate enough to do go back to work or school  Pt no longer has severe end of the day pain  Plan  Patient would benefit from: skilled physical therapy  Planned modality interventions: thermotherapy: hydrocollator packs  Planned therapy interventions: joint mobilization, manual therapy, neuromuscular re-education, patient education, postural training, therapeutic exercise and home exercise program  Frequency: 1x week  Duration in weeks: 12  Treatment plan discussed with: patient        Subjective Evaluation    History of Present Illness  Mechanism of injury: She recently went to the orthodontist who noted jaw clicking  She recently had MRI, stress tests and blood work and everything was clear  Pain  Current pain rating: 3  At best pain rating: 3  At worst pain rating: 10  Location: Bilateral head pain from the temples to the apex of the head  Jaw pain over the masseters  Quality: throbbing  Relieving factors: heat    Life stress: cleaning, caring for 10 and 10 yo children    Treatments  No previous or current treatments  Patient Goals  Patient goals for therapy: decreased pain  Patient goal: focus and get things done, work on career        Objective     Concurrent Complaints  Positive for dizziness, headaches and trouble swallowing  Negative for night pain, disturbed sleep, faints, nausea/motion sickness, tinnitus, difficulty breathing, shortness of breath, respiratory pain and visual change    Postural Observations  Seated posture: fair  Standing posture: fair  Correction of posture: makes symptoms better        Palpation   Left   No palpable tenderness to the lateral pterygoid and medial pterygoid  Hypertonic in the scalenes, sternocleidomastoid and suboccipitals  Tenderness of the scalenes and suboccipitals  Right   Hypertonic in the scalenes, sternocleidomastoid, suboccipitals and masseter     Tenderness of the scalenes, suboccipitals, masseter, lateral pterygoid and medial pterygoid  Tenderness     Additional Tenderness Details  Right TMJ is tender    Active Range of Motion   Cervical/Thoracic Spine       Cervical  Subcranial protraction:  WFL   Subcranial retraction:   Restriction level: minimal  Flexion:  with pain Restriction level: minimal  Extension:  WFL  Left lateral flexion:  Restriction level: minimal  Right lateral flexion:  with pain Restriction level moderate  Left rotation:  WFL  Right rotation:  WFL and with pain    Tests   Cervical     Left   Positive Spurling's Test A  TMJ   Facial symmetry comments: right masseter appears enlarged  normal jaw occlusion  Occlusion class: class I (normal)  Patient does not have a  cleft palate  Scalloping of tongue: no  Cusp wear: yes  Jaw trauma: no  Prognathia: no  Retrognathia: no  Joint sounds left: normal  Joint sounds right: normal  ROM: limited  Opening (mm): 25 and right deviation   Lateral excursion, left (mm): 9  Lateral excursion, right (mm)t: 15   Protrusion (mm): within normal limits   Neuro Exam:     Headaches   Patient reports headaches: Yes         Flowsheet Rows      Most Recent Value   PT/OT G-Codes   Current Score  56   Projected Score  67             Precautions: anxiety     Daily Treatment Diary      Assessment  9/20                     Eval/Reval                       FOTO         **         **   Manuals   Assess cerv accessory motion                       SOR NT            Upper cervical distraction NT            Exercise Diary     supine cerv retr 10"x5  BP cuff                    tongue up opening x6                      TB rows x10                      TB ER x10                      TB Y                        Upper trap stretch                        Levator stretch                        Scalene stretch Modalities                                             Access Code: [email or web address]  URL: [email or web address]  Date: 09/20/2021  Prepared by: Jamie Vickers    Exercises  Supine Diaphragmatic Breathing - 1 x daily - 7 x weekly  Supine Cervical Retraction with Towel - 1 x daily - 7 x weekly - 1 sets - 10 reps - 10 sec hold  Standing Bilateral Low Shoulder Row with Anchored Resistance - 2 x daily - 7 x weekly - 2 sets - 10 reps  Shoulder External Rotation and Scapular Retraction with Resistance - 2 x daily - 7 x weekly - 2 sets - 10 reps

## 2021-09-30 ENCOUNTER — OFFICE VISIT (OUTPATIENT)
Dept: PHYSICAL THERAPY | Facility: REHABILITATION | Age: 31
End: 2021-09-30
Payer: COMMERCIAL

## 2021-09-30 DIAGNOSIS — R51.9 CRANIOFACIAL PAIN: Primary | ICD-10-CM

## 2021-09-30 DIAGNOSIS — R51.9 CHRONIC NONINTRACTABLE HEADACHE, UNSPECIFIED HEADACHE TYPE: ICD-10-CM

## 2021-09-30 DIAGNOSIS — G89.29 CHRONIC NONINTRACTABLE HEADACHE, UNSPECIFIED HEADACHE TYPE: ICD-10-CM

## 2021-09-30 DIAGNOSIS — M54.2 NECK PAIN: ICD-10-CM

## 2021-09-30 DIAGNOSIS — G43.011 MIGRAINE WITHOUT AURA, WITH INTRACTABLE MIGRAINE, SO STATED, WITH STATUS MIGRAINOSUS: ICD-10-CM

## 2021-09-30 DIAGNOSIS — R20.2 ARM PARESTHESIA, RIGHT: ICD-10-CM

## 2021-09-30 DIAGNOSIS — R20.2 RIGHT LEG PARESTHESIAS: ICD-10-CM

## 2021-09-30 DIAGNOSIS — M26.621 ARTHRALGIA OF RIGHT TEMPOROMANDIBULAR JOINT: ICD-10-CM

## 2021-09-30 PROCEDURE — 97140 MANUAL THERAPY 1/> REGIONS: CPT

## 2021-09-30 PROCEDURE — 97110 THERAPEUTIC EXERCISES: CPT

## 2021-09-30 NOTE — PROGRESS NOTES
Daily Note     Today's date: 2021  Patient name: Becka Mcbride  : 1990  MRN: [id number]  Referring provider: Faith Sanchez MD  Dx:   Encounter Diagnosis     ICD-10-CM    1  Craniofacial pain  R51 9    2  Right leg paresthesias  R20 2    3  Arm paresthesia, right  R20 2    4  Chronic nonintractable headache, unspecified headache type  R51 9     G89 29    5  Migraine without aura, with intractable migraine, so stated, with status migrainosus  G43 011    6  Arthralgia of right temporomandibular joint  M26 621    7  Neck pain  M54 2                   Subjective: Pt reports that she did not do her exercises the past 2 days because she has been busy and stressed out but when she was doing the exercises they were helpful  Objective: See treatment diary below      Assessment: Pt tolerated treatment well  Pt did well with addition of UT and scalene stretching needing min cueing initially due to it being her first time completing the exercise with good carryover after correction  Patient exhibited good technique with therapeutic exercises and would benefit from continued PT  Plan: Continue per plan of care  Progress treatment as tolerated         Precautions: anxiety     Daily Treatment Diary      Assessment                     Eval/Reval                       FOTO         **         **   Manuals   Assess cerv accessory motion                       SOR NT ML           Upper cervical distraction NT ML           Exercise Diary     supine cerv retr 10"x5  10"x5  BP Cuff                   tongue up opening x6  x10                    TB rows x10  GTB  2x10                    TB ER x10  GTB  2x10                    TB Y                        Upper trap stretch    3x10"                    Levator stretch                        Scalene stretch    3x10" Modalities                                             Access Code: [email or web address]  URL: [email or web address]  Date: 09/20/2021  Prepared by: Fabiano Morillo    Exercises  Supine Diaphragmatic Breathing - 1 x daily - 7 x weekly  Supine Cervical Retraction with Towel - 1 x daily - 7 x weekly - 1 sets - 10 reps - 10 sec hold  Standing Bilateral Low Shoulder Row with Anchored Resistance - 2 x daily - 7 x weekly - 2 sets - 10 reps  Shoulder External Rotation and Scapular Retraction with Resistance - 2 x daily - 7 x weekly - 2 sets - 10 reps

## 2021-10-04 ENCOUNTER — OFFICE VISIT (OUTPATIENT)
Dept: FAMILY MEDICINE CLINIC | Facility: CLINIC | Age: 31
End: 2021-10-04
Payer: COMMERCIAL

## 2021-10-04 VITALS
TEMPERATURE: 98 F | RESPIRATION RATE: 14 BRPM | SYSTOLIC BLOOD PRESSURE: 126 MMHG | HEIGHT: 67 IN | BODY MASS INDEX: 25.58 KG/M2 | HEART RATE: 85 BPM | OXYGEN SATURATION: 98 % | DIASTOLIC BLOOD PRESSURE: 84 MMHG | WEIGHT: 163 LBS

## 2021-10-04 DIAGNOSIS — F41.1 ANXIETY STATE: Primary | ICD-10-CM

## 2021-10-04 DIAGNOSIS — L98.9 SKIN LESION: ICD-10-CM

## 2021-10-04 PROCEDURE — 99214 OFFICE O/P EST MOD 30 MIN: CPT | Performed by: PHYSICIAN ASSISTANT

## 2021-10-04 PROCEDURE — 3008F BODY MASS INDEX DOCD: CPT | Performed by: PHYSICIAN ASSISTANT

## 2021-10-06 ENCOUNTER — OFFICE VISIT (OUTPATIENT)
Dept: PHYSICAL THERAPY | Facility: REHABILITATION | Age: 31
End: 2021-10-06
Payer: COMMERCIAL

## 2021-10-06 DIAGNOSIS — R20.2 ARM PARESTHESIA, RIGHT: ICD-10-CM

## 2021-10-06 DIAGNOSIS — M54.2 NECK PAIN: ICD-10-CM

## 2021-10-06 DIAGNOSIS — R51.9 CRANIOFACIAL PAIN: Primary | ICD-10-CM

## 2021-10-06 DIAGNOSIS — M26.621 ARTHRALGIA OF RIGHT TEMPOROMANDIBULAR JOINT: ICD-10-CM

## 2021-10-06 DIAGNOSIS — G43.011 MIGRAINE WITHOUT AURA, WITH INTRACTABLE MIGRAINE, SO STATED, WITH STATUS MIGRAINOSUS: ICD-10-CM

## 2021-10-06 PROCEDURE — 97010 HOT OR COLD PACKS THERAPY: CPT | Performed by: PHYSICAL THERAPIST

## 2021-10-06 PROCEDURE — 97110 THERAPEUTIC EXERCISES: CPT | Performed by: PHYSICAL THERAPIST

## 2021-10-06 PROCEDURE — 97140 MANUAL THERAPY 1/> REGIONS: CPT | Performed by: PHYSICAL THERAPIST

## 2021-10-08 ENCOUNTER — TELEPHONE (OUTPATIENT)
Dept: HEMATOLOGY ONCOLOGY | Facility: CLINIC | Age: 31
End: 2021-10-08

## 2021-10-13 ENCOUNTER — APPOINTMENT (OUTPATIENT)
Dept: PHYSICAL THERAPY | Facility: REHABILITATION | Age: 31
End: 2021-10-13
Payer: COMMERCIAL

## 2021-10-14 ENCOUNTER — TELEPHONE (OUTPATIENT)
Dept: FAMILY MEDICINE CLINIC | Facility: CLINIC | Age: 31
End: 2021-10-14

## 2021-10-20 ENCOUNTER — APPOINTMENT (OUTPATIENT)
Dept: PHYSICAL THERAPY | Facility: REHABILITATION | Age: 31
End: 2021-10-20
Payer: COMMERCIAL

## 2021-10-27 ENCOUNTER — OFFICE VISIT (OUTPATIENT)
Dept: PHYSICAL THERAPY | Facility: REHABILITATION | Age: 31
End: 2021-10-27
Payer: COMMERCIAL

## 2021-10-27 DIAGNOSIS — M54.2 NECK PAIN: ICD-10-CM

## 2021-10-27 DIAGNOSIS — R51.9 CRANIOFACIAL PAIN: Primary | ICD-10-CM

## 2021-10-27 DIAGNOSIS — G43.011 MIGRAINE WITHOUT AURA, WITH INTRACTABLE MIGRAINE, SO STATED, WITH STATUS MIGRAINOSUS: ICD-10-CM

## 2021-10-27 PROCEDURE — 97140 MANUAL THERAPY 1/> REGIONS: CPT | Performed by: PHYSICAL THERAPIST

## 2021-10-27 PROCEDURE — 97110 THERAPEUTIC EXERCISES: CPT | Performed by: PHYSICAL THERAPIST

## 2021-11-03 ENCOUNTER — OFFICE VISIT (OUTPATIENT)
Dept: PHYSICAL THERAPY | Facility: REHABILITATION | Age: 31
End: 2021-11-03
Payer: COMMERCIAL

## 2021-11-03 DIAGNOSIS — G43.011 MIGRAINE WITHOUT AURA, WITH INTRACTABLE MIGRAINE, SO STATED, WITH STATUS MIGRAINOSUS: ICD-10-CM

## 2021-11-03 DIAGNOSIS — M54.2 NECK PAIN: ICD-10-CM

## 2021-11-03 DIAGNOSIS — M26.621 ARTHRALGIA OF RIGHT TEMPOROMANDIBULAR JOINT: ICD-10-CM

## 2021-11-03 DIAGNOSIS — F41.1 ANXIETY STATE: ICD-10-CM

## 2021-11-03 DIAGNOSIS — R51.9 CRANIOFACIAL PAIN: Primary | ICD-10-CM

## 2021-11-03 PROCEDURE — 97140 MANUAL THERAPY 1/> REGIONS: CPT

## 2021-11-03 PROCEDURE — 97110 THERAPEUTIC EXERCISES: CPT

## 2021-11-04 DIAGNOSIS — R51.9 CHRONIC HEADACHES: ICD-10-CM

## 2021-11-04 DIAGNOSIS — G89.29 CHRONIC HEADACHES: ICD-10-CM

## 2021-11-04 RX ORDER — PROPRANOLOL HYDROCHLORIDE 20 MG/1
20 TABLET ORAL EVERY 12 HOURS SCHEDULED
Qty: 60 TABLET | Refills: 1 | Status: SHIPPED | OUTPATIENT
Start: 2021-11-04 | End: 2022-01-05

## 2021-11-04 RX ORDER — HYDROXYZINE HYDROCHLORIDE 25 MG/1
TABLET, FILM COATED ORAL
Qty: 90 TABLET | Refills: 0 | Status: SHIPPED | OUTPATIENT
Start: 2021-11-04 | End: 2022-01-04

## 2021-11-10 ENCOUNTER — APPOINTMENT (OUTPATIENT)
Dept: PHYSICAL THERAPY | Facility: REHABILITATION | Age: 31
End: 2021-11-10
Payer: COMMERCIAL

## 2021-11-23 ENCOUNTER — OFFICE VISIT (OUTPATIENT)
Dept: DERMATOLOGY | Facility: CLINIC | Age: 31
End: 2021-11-23
Payer: COMMERCIAL

## 2021-11-23 VITALS — HEIGHT: 67 IN | TEMPERATURE: 97.1 F | WEIGHT: 162 LBS | BODY MASS INDEX: 25.43 KG/M2

## 2021-11-23 DIAGNOSIS — L73.0 ACNE SCARRING: ICD-10-CM

## 2021-11-23 DIAGNOSIS — D18.01 CHERRY ANGIOMA: Primary | ICD-10-CM

## 2021-11-23 PROCEDURE — 3008F BODY MASS INDEX DOCD: CPT | Performed by: PHYSICIAN ASSISTANT

## 2021-11-23 PROCEDURE — 99202 OFFICE O/P NEW SF 15 MIN: CPT | Performed by: DERMATOLOGY

## 2022-01-04 DIAGNOSIS — F41.1 ANXIETY STATE: ICD-10-CM

## 2022-01-04 RX ORDER — HYDROXYZINE HYDROCHLORIDE 25 MG/1
TABLET, FILM COATED ORAL
Qty: 90 TABLET | Refills: 0 | Status: SHIPPED | OUTPATIENT
Start: 2022-01-04 | End: 2022-06-30 | Stop reason: SDUPTHER

## 2022-01-05 DIAGNOSIS — R51.9 CHRONIC HEADACHES: ICD-10-CM

## 2022-01-05 DIAGNOSIS — G89.29 CHRONIC HEADACHES: ICD-10-CM

## 2022-01-05 RX ORDER — PROPRANOLOL HYDROCHLORIDE 20 MG/1
20 TABLET ORAL EVERY 12 HOURS SCHEDULED
Qty: 60 TABLET | Refills: 1 | Status: SHIPPED | OUTPATIENT
Start: 2022-01-05 | End: 2022-03-08

## 2022-01-06 ENCOUNTER — TELEMEDICINE (OUTPATIENT)
Dept: FAMILY MEDICINE CLINIC | Facility: CLINIC | Age: 32
End: 2022-01-06
Payer: MEDICARE

## 2022-01-06 DIAGNOSIS — U07.1 COVID: Primary | ICD-10-CM

## 2022-01-06 DIAGNOSIS — J40 BRONCHITIS: ICD-10-CM

## 2022-01-06 PROCEDURE — 99214 OFFICE O/P EST MOD 30 MIN: CPT | Performed by: INTERNAL MEDICINE

## 2022-01-06 RX ORDER — ALBUTEROL SULFATE 90 UG/1
2 AEROSOL, METERED RESPIRATORY (INHALATION) EVERY 6 HOURS PRN
Qty: 8.5 G | Refills: 0 | Status: SHIPPED | OUTPATIENT
Start: 2022-01-06 | End: 2022-03-11

## 2022-01-06 RX ORDER — BUDESONIDE 90 UG/1
1 AEROSOL, POWDER RESPIRATORY (INHALATION) 2 TIMES DAILY
Qty: 1 EACH | Refills: 0 | Status: SHIPPED | OUTPATIENT
Start: 2022-01-06 | End: 2022-03-11

## 2022-01-06 RX ORDER — BENZONATATE 200 MG/1
200 CAPSULE ORAL 3 TIMES DAILY PRN
Qty: 20 CAPSULE | Refills: 0 | Status: SHIPPED | OUTPATIENT
Start: 2022-01-06 | End: 2022-03-11

## 2022-01-06 NOTE — PROGRESS NOTES
COVID-19 Outpatient Progress Note    Assessment/Plan:    Problem List Items Addressed This Visit     None      Visit Diagnoses     COVID    -  Primary    Relevant Medications    budesonide (Pulmicort Flexhaler) 90 MCG/ACT inhaler    benzonatate (TESSALON) 200 MG capsule    albuterol (ProAir HFA) 90 mcg/act inhaler    Bronchitis        Relevant Medications    budesonide (Pulmicort Flexhaler) 90 MCG/ACT inhaler    benzonatate (TESSALON) 200 MG capsule    albuterol (ProAir HFA) 90 mcg/act inhaler       encouraged to get pulse oximeter  Patient will start with cough medication as well as inhalers  Will staying abstinent from smoking  Encouraged to follow-up with her PCP on Monday  Any clinical deterioration warrants immediate medical attention encouraged to contact us or call 911  Disposition:     Patient is not fully vaccinated and I recommended self quarantine for 5 days followed by strict mask use for an additional 5 days  If patient were to develop symptoms, they should immediately self isolate and call our office for further guidance  Risks and benefits of COVID-19 vaccination was discussed with patient  I have spent 20 minutes directly with the patient  Greater than 50% of this time was spent in counseling/coordination of care regarding: risks and benefits of treatment options, instructions for management, patient and family education and impressions  Encounter provider Royal Etta MD    Provider located at [address] [place] [address] 264, [facility] [facility] , [address]  [facility] [phone]    Recent Visits  No visits were found meeting these conditions    Showing recent visits within past 7 days and meeting all other requirements  Today's Visits  Date Type Provider Dept   01/06/22 Telemedicine Royal Etta MD [facility] 93 today's visits and meeting all other requirements  Future Appointments  No visits were found meeting these conditions  Showing future appointments within next 150 days and meeting all other requirements     This virtual check-in was done via telephone and she agrees to proceed  Patient agrees to participate in a virtual check in via telephone or video visit instead of presenting to the office to address urgent/immediate medical needs  Patient is aware this is a billable service  After connecting through Telephone, the patient was identified by name and date of birth  Gonzalo Hinson was informed that this was a telemedicine visit and that the exam was being conducted confidentially over secure lines  My office door was closed  No one else was in the room  Gonzalo Hinson acknowledged consent and understanding of privacy and security of the telemedicine visit  I informed the patient that I have reviewed her record in Epic and presented the opportunity for her to ask any questions regarding the visit today  The patient agreed to participate  It was my intent to perform this visit via video technology but the patient was not able to do a video connection so the visit was completed via audio telephone only  Verification of patient location:  Patient is located in the following state in which I hold an active license: [place]    Subjective:   Gonzalo Hinson is a [address] y o  female who is concerned about COVID-19  Patient's symptoms include fatigue, nasal congestion, rhinorrhea, sore throat, cough, shortness of breath, chest tightness, myalgias and headache  Patient denies fever, chills, malaise, anosmia, loss of taste, abdominal pain, nausea, vomiting and diarrhea  Date of symptom onset: 1/5/2022  COVID-19 vaccination status: Not vaccinated       Patient was [facility] in [facility] ER with chest discomfort    Pulse ox in the ER was 98%  Lab Results   Component Value Date    SARSCORONAVI Detected (A) 01/05/2022     Past Medical History:   Diagnosis Date    Anxiety     Depression     Hypertension History reviewed  No pertinent surgical history  Current Outpatient Medications   Medication Sig Dispense Refill    busPIRone (BUSPAR) 5 mg tablet Take 1 tablet (5 mg total) by mouth 2 (two) times a day 180 tablet 0    hydrOXYzine HCL (ATARAX) 25 mg tablet TAKE 1 TABLET BY MOUTH EVERY 8 HOURS AS NEEDED FOR ANXIETY 90 tablet 0    albuterol (ProAir HFA) 90 mcg/act inhaler Inhale 2 puffs every 6 (six) hours as needed for wheezing 8 5 g 0    benzonatate (TESSALON) 200 MG capsule Take 1 capsule (200 mg total) by mouth 3 (three) times a day as needed for cough 20 capsule 0    budesonide (Pulmicort Flexhaler) 90 MCG/ACT inhaler Inhale 1 puff 2 (two) times a day Rinse mouth after use  1 each 0    propranolol (INDERAL) 20 mg tablet TAKE 1 TABLET (20 MG TOTAL) BY MOUTH EVERY 12 (TWELVE) HOURS (Patient not taking: Reported on 1/6/2022 ) 60 tablet 1     No current facility-administered medications for this visit  Allergies   Allergen Reactions    No Known Allergies        Review of Systems   Constitutional: Positive for fatigue  Negative for chills and fever  HENT: Positive for congestion, rhinorrhea and sore throat  Respiratory: Positive for cough, chest tightness and shortness of breath  Gastrointestinal: Negative for abdominal pain, diarrhea, nausea and vomiting  Musculoskeletal: Positive for myalgias  Neurological: Positive for headaches  Objective: There were no vitals filed for this visit  Physical Exam  It was my intent to perform this visit via video technology but could not do a video connection so the visit was completed via audio telephone only  VIRTUAL VISIT DISCLAIMER    Maxim Greco verbally agrees to participate in [employer]   Pt is aware that [employer] could be limited without vital signs or the ability to perform a full hands-on physical Astorga Missy understands she or the provider may request at any time to terminate the video visit and request the patient to seek care or treatment in person

## 2022-01-07 ENCOUNTER — TELEMEDICINE (OUTPATIENT)
Dept: FAMILY MEDICINE CLINIC | Facility: CLINIC | Age: 32
End: 2022-01-07
Payer: MEDICARE

## 2022-01-07 ENCOUNTER — TELEPHONE (OUTPATIENT)
Dept: FAMILY MEDICINE CLINIC | Facility: CLINIC | Age: 32
End: 2022-01-07

## 2022-01-07 VITALS — OXYGEN SATURATION: 98 %

## 2022-01-07 DIAGNOSIS — U07.1 COVID-19 VIRUS INFECTION: Primary | ICD-10-CM

## 2022-01-07 DIAGNOSIS — R07.89 MUSCULOSKELETAL CHEST PAIN: ICD-10-CM

## 2022-01-07 PROBLEM — J02.9 SORE THROAT: Status: RESOLVED | Noted: 2020-10-01 | Resolved: 2022-01-07

## 2022-01-07 PROCEDURE — 99214 OFFICE O/P EST MOD 30 MIN: CPT | Performed by: PHYSICIAN ASSISTANT

## 2022-01-07 NOTE — TELEPHONE ENCOUNTER
Pt called in requesting Dr Webb prescribe her a Z-Rolf  Pt states members in her home are on this and are getting better and she feels like she is getting worst  Please advise thank you

## 2022-01-07 NOTE — PROGRESS NOTES
Virtual Brief Visit    Patient is located in the following state in which I hold an active license [place]    It was my intent to perform this visit via video technology but the patient was not able to do a video connection so the visit was completed via audio telephone only  Assessment/Plan:    Problem List Items Addressed This Visit        Other    COVID-19 virus infection - Primary    Musculoskeletal chest pain        3 days with COVID-19 infection    ER record from 01/05/2022 has been reviewed from [facility]  I did review Dr Shania Devries  note from yesterday    -her final chest x-ray results did not reveal COVID pneumonia  -I did advise her to apply moist heat to her chest 3-4 times daily for 10 minutes   -continue on the NyQuil, Tylenol and ibuprofen over-the-counter as needed as package direct   -I did give her instruction on utilizing the albuterol as needed   -I did advise her to call [facility] pharmacy in question why she did not get the Pulmicort  -continue with increased fluids   -continue with appetite as tolerated   -I did advise her that she should go the emergency room if her ambulatory pulse ox drops below 90% and has increasing symptoms with shortness of breath  Call the office with any questions   -recommend follow-up virtual visit in 3 days on Monday 1/10    M*Modal software was used to dictate this note  It may contain errors with dictating incorrect words/spelling  Please contact provider directly for any questions       Recent Visits  Date Type Provider Dept   01/06/22 Telemedicine Wayne Dowling MD [facility]   Showing recent visits within past 7 days and meeting all other requirements  Today's Visits  Date Type Provider Dept   01/07/22 Telemedicine Ree Dance, PA-C [facility]   01/07/22 Telephone David Soto today's visits and meeting all other requirements  Future Appointments  No visits were found meeting these conditions  Showing future appointments within next 150 days and meeting all other requirements     HPI:  Patient presents today for a followup virtual visit for COVID-19 infection  She was not able to connect for a face-to-face visit  She did have a virtual visit yesterday with doctor Andriy Sepulveda  She was prescribed albuterol and Pulmicort  She states that the pharmacy only gave her 1 inhaler  She states that she was told to sleep on her stomach Katerine Dougherty she is having increasing rib pain today  She did go to the emergency room on 01/04 and she did have a chest x-ray which did not reveal any COVID pneumonia at that time  She did get a pulse ox and she states that her pulse ox resting was 98% today  I did have her check an ambulatory pulse ox Irina was on the phone with her which was 99%  He has been utilizing NyQuil as needed and taking Tylenol and Motrin      I spent 7 minutes directly with the patient during this visit

## 2022-02-14 ENCOUNTER — OFFICE VISIT (OUTPATIENT)
Dept: DERMATOLOGY | Facility: CLINIC | Age: 32
End: 2022-02-14
Payer: MEDICARE

## 2022-02-14 VITALS — WEIGHT: 172 LBS | TEMPERATURE: 98.1 F | BODY MASS INDEX: 27 KG/M2 | HEIGHT: 67 IN

## 2022-02-14 DIAGNOSIS — L73.0 ACNE SCARRING: Primary | ICD-10-CM

## 2022-02-14 DIAGNOSIS — L70.0 ACNE VULGARIS: ICD-10-CM

## 2022-02-14 PROCEDURE — 99213 OFFICE O/P EST LOW 20 MIN: CPT | Performed by: DERMATOLOGY

## 2022-02-14 RX ORDER — TRETINOIN 0.025 %
CREAM (GRAM) TOPICAL
Qty: 45 G | Refills: 6 | Status: SHIPPED | OUTPATIENT
Start: 2022-02-14

## 2022-02-14 RX ORDER — DOXYCYCLINE 100 MG/1
CAPSULE ORAL
Qty: 180 CAPSULE | Refills: 0 | Status: SHIPPED | OUTPATIENT
Start: 2022-02-14 | End: 2022-04-14

## 2022-02-14 NOTE — PROGRESS NOTES
[facility] Follow Up Note    Patient Name: Rodger Smith  Encounter Date: 2/14/2022    Today's Chief Concerns:  [facility] Concern #1:  Consultation for laser for acne scarring       Current Medications:    Current Outpatient Medications:     hydrOXYzine HCL (ATARAX) 25 mg tablet, TAKE 1 TABLET BY MOUTH EVERY 8 HOURS AS NEEDED FOR ANXIETY, Disp: 90 tablet, Rfl: 0    albuterol (ProAir HFA) 90 mcg/act inhaler, Inhale 2 puffs every 6 (six) hours as needed for wheezing, Disp: 8 5 g, Rfl: 0    benzonatate (TESSALON) 200 MG capsule, Take 1 capsule (200 mg total) by mouth 3 (three) times a day as needed for cough, Disp: 20 capsule, Rfl: 0    budesonide (Pulmicort Flexhaler) 90 MCG/ACT inhaler, Inhale 1 puff 2 (two) times a day Rinse mouth after use  (Patient not taking: Reported on 1/7/2022 ), Disp: 1 each, Rfl: 0    busPIRone (BUSPAR) 5 mg tablet, Take 1 tablet (5 mg total) by mouth 2 (two) times a day (Patient not taking: Reported on 1/7/2022 ), Disp: 180 tablet, Rfl: 0    propranolol (INDERAL) 20 mg tablet, TAKE 1 TABLET (20 MG TOTAL) BY MOUTH EVERY 12 (TWELVE) HOURS (Patient not taking: Reported on 1/6/2022 ), Disp: 60 tablet, Rfl: 1    CONSTITUTIONAL:   Vitals:    02/14/22 1100   Temp: 98 1 °F (36 7 °C)   TempSrc: Temporal   Weight: 78 kg (172 lb)   Height: 5' 7" (1 702 m)         Specific Alerts:    Have you been seen by a [facility] Dermatologist in the last 3 years? YES    Are you pregnant or planning to become pregnant? No    Are you currently or planning to be nursing or breast feeding? No    Allergies   Allergen Reactions    No Known Allergies        May we call your Preferred Phone number to discuss your specific medical information? YES    May we leave a detailed message that includes your specific medical information? YES    Have you traveled outside of the [facility] in the past 3 months? No    Do you currently have a pacemaker or defibrillator?  No    Do you have any artificial heart valves, joints, plates, screws, rods, stents, pins, etc? No   - If Yes, were any placed within the last 2 years? Do you require any medications prior to a surgical procedure? No      Are you taking any medications that cause you to bleed more easily ("blood thinners") No    Have you ever experienced a rapid heartbeat with epinephrine? No      Review of Systems:  Have you recently had or currently have any of the following?     · Fever or chills: No  · Night Sweats: No  · Headaches: No  · Weight Gain: No  · Weight Loss: No  · Blurry Vision: No  · Nausea: No  · Vomiting: No  · Diarrhea: No  · Blood in Stool: No  · Abdominal Pain: No  · Itchy Skin: No  · Painful Joints: No  · Swollen Joints: No  · Muscle Pain: No  · Irregular Mole: No  · Sun Burn: No  · Dry Skin: No  · Skin Color Changes: No  · Scar or Keloid: No  · Cold Sores/Fever Blisters: No  · Bacterial Infections/MRSA: No  · Anxiety: No  · Depression: No  · Suicidal or Homicidal Thoughts: No      PSYCH: Normal mood and affect  EYES: Normal conjunctiva  ENT: Normal lips and oral mucosa  CARDIOVASCULAR: No edema  RESPIRATORY: Normal respirations  HEME/LYMPH/IMMUNO:  No regional lymphadenopathy except as noted below in ASSESSMENT AND PLAN BY DIAGNOSIS    FULL ORGAN SYSTEM SKIN EXAM (SKIN)   Hair, Scalp, Ears, Face Normal except as noted below in Assessment   Neck, Cervical Chain Nodes Normal except as noted below in Assessment   Right Arm/Hand/Fingers Normal except as noted below in Assessment   Left Arm/Hand/Fingers Normal except as noted below in Assessment   Chest/Breasts/Axillae Viewed areas Normal except as noted below in Assessment   Abdomen, Umbilicus Normal except as noted below in Assessment   Back/Spine Normal except as noted below in Assessment   Groin/Genitalia/Buttocks Viewed areas Normal except as noted below in Assessment   Right Leg, Foot, Toes Normal except as noted below in Assessment   Left Leg, Foot, Toes Normal except as noted below in Assessment       1  ATROPHIC SCARRING  2  ACNE SCARRING     Physical Exam:   Anatomic Location Affected:  Bilateral cheeks   Morphological Description:  Erythematous pink scars; mild inflammatory papules and pustules   Pertinent Positives:   Pertinent Negatives: Additional History of Present Condition:  Patient wants to discuss acne scarring treatment options  She says she has not tried over the counter acne products, she feels her skin has become very sensitive that she does not want to make more damage to her skin/face  Assessment and Plan:  Based on a thorough discussion of this condition and the management approach to it (including a comprehensive discussion of the known risks, side effects and potential benefits of treatment), the patient (family) agrees to implement the following specific plan:   Discussed we should take of your acne   Start Doxycyline twice a day for 3 months  Take it after breakfast and after dinner  Take it with a full glass of water  Discussed sun protection during the time you are on this medication (Ordered)    Start Retin-A 0 025% cream  Spread one pea-sized amount of medication over entire face about one hour before bedtime  May apply it every other day if you can't tolerate it for the first time  (Ordered)     Follow up in 3 months with Dr Luh Combs         Scribe Attestation    I,:  Casa Jaramillo am acting as a scribe while in the presence of the attending physician :       I,:  Lily Blackman MD personally performed the services described in this documentation    as scribed in my presence :

## 2022-02-14 NOTE — PATIENT INSTRUCTIONS
ACNE    Assessment and Plan:  Based on a thorough discussion of this condition and the management approach to it (including a comprehensive discussion of the known risks, side effects and potential benefits of treatment), the patient (family) agrees to implement the following specific plan:   Discussed we should take of your acne   Start Doxycyline twice a day for 3 months  Take it after breakfast and after dinner  Take it with a full glass of water  Discussed sun protection during the time you are on this medication  (Ordered)    Start Retin-A 0 025% cream  Spread one pea-sized amount of medication over entire face about one hour before bedtime  May apply it every other day if you can't tolerate it for the first time  (Ordered)     Follow up in 3 months with Dr Brenda Kitchen

## 2022-03-03 ENCOUNTER — TELEPHONE (OUTPATIENT)
Dept: NEUROLOGY | Facility: CLINIC | Age: 32
End: 2022-03-03

## 2022-03-03 NOTE — TELEPHONE ENCOUNTER
Called pt to reschedule her appointment for today since Fernandadougie Rikki is out of the office  Fernandadougie Langeismael said she can be seen by Maura Mark, pt accepted appointment with Maura Mark on 03/08

## 2022-03-08 ENCOUNTER — OFFICE VISIT (OUTPATIENT)
Dept: NEUROLOGY | Facility: CLINIC | Age: 32
End: 2022-03-08
Payer: MEDICARE

## 2022-03-08 VITALS
SYSTOLIC BLOOD PRESSURE: 116 MMHG | DIASTOLIC BLOOD PRESSURE: 73 MMHG | BODY MASS INDEX: 26.84 KG/M2 | TEMPERATURE: 97.7 F | HEIGHT: 67 IN | HEART RATE: 84 BPM | WEIGHT: 171 LBS

## 2022-03-08 DIAGNOSIS — R53.1 RIGHT SIDED WEAKNESS: ICD-10-CM

## 2022-03-08 DIAGNOSIS — R51.9 CHRONIC NONINTRACTABLE HEADACHE, UNSPECIFIED HEADACHE TYPE: ICD-10-CM

## 2022-03-08 DIAGNOSIS — G89.29 CHRONIC NONINTRACTABLE HEADACHE, UNSPECIFIED HEADACHE TYPE: ICD-10-CM

## 2022-03-08 DIAGNOSIS — R20.2 RIGHT LEG PARESTHESIAS: Primary | ICD-10-CM

## 2022-03-08 DIAGNOSIS — R20.2 PARESTHESIAS: ICD-10-CM

## 2022-03-08 DIAGNOSIS — R20.2 ARM PARESTHESIA, RIGHT: ICD-10-CM

## 2022-03-08 PROCEDURE — 99214 OFFICE O/P EST MOD 30 MIN: CPT | Performed by: NURSE PRACTITIONER

## 2022-03-08 RX ORDER — AMITRIPTYLINE HYDROCHLORIDE 10 MG/1
10 TABLET, FILM COATED ORAL
Qty: 30 TABLET | Refills: 1 | Status: SHIPPED | OUTPATIENT
Start: 2022-03-08 | End: 2022-05-05

## 2022-03-08 NOTE — ASSESSMENT & PLAN NOTE
Patient continues with chronic headaches, appear tension type, no real migrainous symptoms with headaches  She did find propanolol helpful in reducing headaches, however she has found side effect of hair loss  Will trial amitriptyline as could assist with sleep as well  She was instructed to not take with hydroxyzine as to avoid oversedation

## 2022-03-08 NOTE — PROGRESS NOTES
Patient ID: Nazario Grimes is a 28 y o  female  Assessment/Plan:    Chronic headaches  Patient continues with chronic headaches, appear tension type, no real migrainous symptoms with headaches  She did find propanolol helpful in reducing headaches, however she has found side effect of hair loss  Will trial amitriptyline as could assist with sleep as well  She was instructed to not take with hydroxyzine as to avoid oversedation  Paresthesias  Patient with some progression and change of symptoms since last seen  She feels right sided numbness has improved, however feels weaker on the right side  She also reports numbness of the left leg in the L4 and S1 distributions which is new  On exam she has no muscle weakness other then with right wrist flexion (5-/5)-she has known carpal tunnel  She does have diminished sensation to pinprick and temperature along the S1 distribution on the left, otherwise normal  She does c/o some urinary frequency at times  Would have concerns for a radiculopathy given distribution of symptoms, she does have prior trauma to the area as well, will obtain MRI lumbar spine       Will refer to PT for subjective right sided weakness and possible radicular pain, she was reassured with normal brain MRI and EMG with only finding of carpal tunnel  Plan for follow up in 6 months  Diagnoses and all orders for this visit:    Right leg paresthesias  -     MRI lumbar spine without contrast; Future    Arm paresthesia, right    Chronic nonintractable headache, unspecified headache type  -     amitriptyline (ELAVIL) 10 mg tablet; Take 1 tablet (10 mg total) by mouth daily at bedtime    Paresthesias  -     MRI lumbar spine without contrast; Future    Right sided weakness  -     Ambulatory Referral to Physical Therapy; Future           Subjective:    HPI    patient is a 81-year-old woman who presents for follow up forright-sided paresthesias         To review, Patient reports she had a fall about 2 years ago, she missed a step, and landed on her coccyx  Since then, she did have pain in her lower back  Sometime after this injury, she started noticing numbness in the right hand  It was initially predominantly on the volar aspect of the lower right wrist, that would radiate up her forearm, however over the last 2 years, it has evolved some and would go all the way to include her whole right upper extremity  She does not have any neck pain, but does report tightness in her neck, did try physical therapy  Felt some loss of strength in the right arm  Wrist brace made symptoms worse  In addition, started noticing numbness in the right lower extremity, which has been more prominent for her over the last 1 year  She denies any radicular symptoms in the right leg, numbness is mainly in the sole, along the medial malleolus, and anteromedial aspect of the left lower leg  Also some abnormal sensations int he right face  Noted to have daily headaches as well  Recently started on atarax for her anxiety  Was prescribed buspar but did not take  She was seen by our office for inital consultation in 9/2021 questioned complex migraine, was to try supplements along with propanolol for symptoms, lifestyle adjustments  Was referred to PT, had pending EMG studies  Interval History:  She feels right sided symptoms have worsened  Feels weaker on this side  No longer having the numbness and tingling  She has been using her left side more due to this  She now has numbness and tingling on the left side now which started a few weeks ago more so in the leg, starts on the lateral aspect of the thigh and wraps about the front of the leg into the calf  Mild tingling in the toes, nothing else in the foot  In the left arm from the shoulder to the elbow, no symptoms in the hands-numbness and tingling and burning at times  Denies dysesthesias     She continues to deal with lower back pain around the tailbone where she injured herself  Headaches are better since starting propanolol, however when she started taking it consistently she was losing hair  She finds the headaches are occurring 2-3x/week  Located frontal and left occipital, does get neck pain with headaches  Describes the pain as aching  Headaches last for a few hours, she does get some mild lightheadedness with headaches-no other migrainous symptoms  She does not take anything for the headaches  She has had these since she was a teenager  She does not notice a pattern with the headaches and the above symptoms  Her balance is good, when she walks ends up favoring the left side because she feels like she can't feel the right side as well  Her job is very stressful  She is trying to handle her stress better which is helping  Propanolol seems to help her anxiety and stress levels as well  She is not sleeping well which is new, which she feels is related to her anxiety, she does take hydroxyzine at night which sometimes will help  She does does have urinary frequency, sometimes feels like she can't empty her bladder completely more so at night when she is laying down, no bowel symptoms  Prior work up:  MRI of the brain 8/2021: normal study  No demyelinating lesions, no stroke, no other pathology  EMG RUE 9/2021: Abnormal study  These electrodiagnostic findings are most consistent with a mild, median  mononeuropathy across the right wrist (carpal tunnel syndrome)  There is no definite electrophysiologic evidence to support a cervical radiculopathy in the right upper  extremity  Objective:    Blood pressure 116/73, pulse 84, temperature 97 7 °F (36 5 °C), temperature source Temporal, height 5' 7" (1 702 m), weight 77 6 kg (171 lb)  Physical Exam  Constitutional:       General: She is awake     HENT:      Right Ear: Hearing normal       Left Ear: Hearing normal    Eyes:      General: Lids are normal       Extraocular Movements: Extraocular movements intact  Pupils: Pupils are equal, round, and reactive to light  Neurological:      Mental Status: She is alert  Deep Tendon Reflexes:      Reflex Scores:       Bicep reflexes are 2+ on the right side and 2+ on the left side  Brachioradialis reflexes are 2+ on the right side and 2+ on the left side  Patellar reflexes are 1+ on the right side and 1+ on the left side  Achilles reflexes are 2+ on the right side and 2+ on the left side  Psychiatric:         Speech: Speech normal          Neurological Exam  Mental Status  Awake and alert  Speech is normal  Language is fluent with no aphasia  Cranial Nerves  CN III, IV, VI: Extraocular movements intact bilaterally  Normal lids and orbits bilaterally  Pupils equal round and reactive to light bilaterally  CN V:  Right: Facial sensation is normal   Left: Facial sensation is normal on the left  CN VII:  Right: There is no facial weakness  Left: There is no facial weakness  CN VIII:  Right: Hearing is normal   Left: Hearing is normal   CN XI:  Right: Trapezius strength is normal   Left: Trapezius strength is normal   CN XII: Tongue midline without atrophy or fasciculations  Motor  Normal muscle bulk throughout                                               Right                     Left  Neck flexion                          5                           Neck extension                     5                           Elbow flexion                         5                          5  Elbow extension                    5                          5  Wrist flexion                           5-                          5  Wrist extension                      5                          5  Finger abduction                    5                          5  Hip flexion                              5                          5  Knee flexion                           5                          5  Knee extension 5                          5  Ankle inversion                      5                          5  Ankle eversion                       5                          5  Plantarflexion                         5                          5  Dorsiflexion                            5                          5  Toe extension                        5                          5    Sensory  Light touch is normal in upper and lower extremities  Pinprick abnormality: Diminished sensation in the s1 distribution on the left side  Temperature abnormality: Diminished sensation in the s1 distribution on the left side  Vibration is normal in upper and lower extremities  Reflexes                                           Right                      Left  Brachioradialis                    2+                         2+  Biceps                                 2+                         2+  Patellar                                1+                         1+  Achilles                                2+                         2+  Plantar                           Downgoing                Downgoing    Gait  Casual gait is normal including stance, stride, and arm swing  Able to rise from chair without using arms  I have personally reviewed the ROS performed by the MA     ROS:    Review of Systems   Constitutional: Negative  Negative for appetite change and fever  HENT: Negative  Negative for hearing loss, tinnitus, trouble swallowing and voice change  Eyes: Negative  Negative for photophobia and pain  Respiratory: Negative  Negative for shortness of breath  Cardiovascular: Negative  Negative for palpitations  Gastrointestinal: Negative  Negative for nausea and vomiting  Endocrine: Negative  Negative for cold intolerance  Genitourinary: Negative  Negative for dysuria, frequency and urgency  Musculoskeletal: Negative  Negative for myalgias and neck pain  Skin: Negative  Negative for rash     Neurological: Positive for weakness (right arm and right leg), numbness (right arm and right leg ) and headaches  Negative for dizziness, tremors, seizures, syncope, facial asymmetry, speech difficulty and light-headedness  Tingling in right arm and right leg  Hematological: Negative  Does not bruise/bleed easily  Psychiatric/Behavioral: Negative  Negative for confusion, hallucinations and sleep disturbance

## 2022-03-08 NOTE — PATIENT INSTRUCTIONS
Stop propanolol, start amitriptyline 10 mg at bedtime, if no improvement in headaches and other symptoms over the next month please call  Would not take with hydroxyzine to avoid over sedation      Obtain MRI lumbar spine with symptoms    Referral to PT to work on strength

## 2022-03-08 NOTE — ASSESSMENT & PLAN NOTE
Patient with some progression and change of symptoms since last seen  She feels right sided numbness has improved, however feels weaker on the right side  She also reports numbness of the left leg in the L4 and S1 distributions which is new  On exam she has no muscle weakness other then with right wrist flexion (5-/5)-she has known carpal tunnel  She does have diminished sensation to pinprick and temperature along the S1 distribution on the left, otherwise normal  She does c/o some urinary frequency at times  Would have concerns for a radiculopathy given distribution of symptoms, she does have prior trauma to the area as well, will obtain MRI lumbar spine       Will refer to PT for subjective right sided weakness and possible radicular pain, she was reassured with normal brain MRI and EMG with only finding of carpal tunnel  Plan for follow up in 6 months

## 2022-03-09 ENCOUNTER — HOSPITAL ENCOUNTER (EMERGENCY)
Facility: HOSPITAL | Age: 32
Discharge: HOME | End: 2022-03-09
Attending: EMERGENCY MEDICINE | Admitting: EMERGENCY MEDICINE
Payer: MEDICARE

## 2022-03-09 VITALS
DIASTOLIC BLOOD PRESSURE: 58 MMHG | OXYGEN SATURATION: 100 % | BODY MASS INDEX: 25.45 KG/M2 | WEIGHT: 162.48 LBS | RESPIRATION RATE: 16 BRPM | HEART RATE: 82 BPM | TEMPERATURE: 98.1 F | SYSTOLIC BLOOD PRESSURE: 109 MMHG

## 2022-03-09 DIAGNOSIS — K52.9 GASTROENTERITIS: ICD-10-CM

## 2022-03-09 DIAGNOSIS — R11.2 NAUSEA AND VOMITING: ICD-10-CM

## 2022-03-09 DIAGNOSIS — R19.7 DIARRHEA: ICD-10-CM

## 2022-03-09 DIAGNOSIS — R10.84 GENERALIZED ABDOMINAL PAIN: Primary | ICD-10-CM

## 2022-03-09 DIAGNOSIS — F41.1 ANXIETY STATE: ICD-10-CM

## 2022-03-09 LAB
ALBUMIN SERPL BCP-MCNC: 4.2 G/DL (ref 3.5–5)
ALP SERPL-CCNC: 54 U/L (ref 46–116)
ALT SERPL W P-5'-P-CCNC: 23 U/L (ref 12–78)
ANION GAP SERPL CALCULATED.3IONS-SCNC: 10 MMOL/L (ref 4–13)
AST SERPL W P-5'-P-CCNC: 15 U/L (ref 5–45)
BACTERIA UR QL AUTO: ABNORMAL /HPF
BASOPHILS # BLD MANUAL: 0 THOUSAND/UL (ref 0–0.1)
BASOPHILS NFR MAR MANUAL: 0 % (ref 0–1)
BILIRUB SERPL-MCNC: 0.69 MG/DL (ref 0.2–1)
BILIRUB UR QL STRIP: ABNORMAL
BUN SERPL-MCNC: 12 MG/DL (ref 5–25)
CALCIUM SERPL-MCNC: 8.7 MG/DL (ref 8.3–10.1)
CHLORIDE SERPL-SCNC: 105 MMOL/L (ref 100–108)
CLARITY UR: CLEAR
CO2 SERPL-SCNC: 25 MMOL/L (ref 21–32)
COLOR UR: YELLOW
CREAT SERPL-MCNC: 0.67 MG/DL (ref 0.6–1.3)
EOSINOPHIL # BLD MANUAL: 0.32 THOUSAND/UL (ref 0–0.4)
EOSINOPHIL NFR BLD MANUAL: 7 % (ref 0–6)
ERYTHROCYTE [DISTWIDTH] IN BLOOD BY AUTOMATED COUNT: 12.3 % (ref 11.6–15.1)
EXT PREG TEST URINE: NEGATIVE
EXT. CONTROL ED NAV: NORMAL
GFR SERPL CREATININE-BSD FRML MDRD: 118 ML/MIN/1.73SQ M
GLUCOSE SERPL-MCNC: 85 MG/DL (ref 65–140)
GLUCOSE UR STRIP-MCNC: NEGATIVE MG/DL
HCT VFR BLD AUTO: 41.6 % (ref 34.8–46.1)
HGB BLD-MCNC: 14.5 G/DL (ref 11.5–15.4)
HGB UR QL STRIP.AUTO: ABNORMAL
KETONES UR STRIP-MCNC: ABNORMAL MG/DL
LEUKOCYTE ESTERASE UR QL STRIP: NEGATIVE
LIPASE SERPL-CCNC: 101 U/L (ref 73–393)
LYMPHOCYTES # BLD AUTO: 0.73 THOUSAND/UL (ref 0.6–4.47)
LYMPHOCYTES # BLD AUTO: 16 % (ref 14–44)
MCH RBC QN AUTO: 31.2 PG (ref 26.8–34.3)
MCHC RBC AUTO-ENTMCNC: 34.9 G/DL (ref 31.4–37.4)
MCV RBC AUTO: 90 FL (ref 82–98)
MONOCYTES # BLD AUTO: 0.45 THOUSAND/UL (ref 0–1.22)
MONOCYTES NFR BLD: 10 % (ref 4–12)
MUCOUS THREADS UR QL AUTO: ABNORMAL
NEUTROPHILS # BLD MANUAL: 3 THOUSAND/UL (ref 1.85–7.62)
NEUTS BAND NFR BLD MANUAL: 2 % (ref 0–8)
NEUTS SEG NFR BLD AUTO: 64 % (ref 43–75)
NITRITE UR QL STRIP: NEGATIVE
NON-SQ EPI CELLS URNS QL MICRO: ABNORMAL /HPF
PH UR STRIP.AUTO: 6 [PH] (ref 4.5–8)
PLATELET # BLD AUTO: 182 THOUSANDS/UL (ref 149–390)
PLATELET BLD QL SMEAR: ADEQUATE
PMV BLD AUTO: 12.3 FL (ref 8.9–12.7)
POTASSIUM SERPL-SCNC: 4 MMOL/L (ref 3.5–5.3)
PROT SERPL-MCNC: 7.6 G/DL (ref 6.4–8.2)
PROT UR STRIP-MCNC: ABNORMAL MG/DL
RBC # BLD AUTO: 4.65 MILLION/UL (ref 3.81–5.12)
RBC #/AREA URNS AUTO: ABNORMAL /HPF
RBC MORPH BLD: NORMAL
SODIUM SERPL-SCNC: 140 MMOL/L (ref 136–145)
SP GR UR STRIP.AUTO: >=1.03 (ref 1–1.03)
UROBILINOGEN UR QL STRIP.AUTO: 1 E.U./DL
VARIANT LYMPHS # BLD AUTO: 1 %
WBC # BLD AUTO: 4.55 THOUSAND/UL (ref 4.31–10.16)
WBC #/AREA URNS AUTO: ABNORMAL /HPF

## 2022-03-09 PROCEDURE — 85027 COMPLETE CBC AUTOMATED: CPT

## 2022-03-09 PROCEDURE — 36415 COLL VENOUS BLD VENIPUNCTURE: CPT

## 2022-03-09 PROCEDURE — 83690 ASSAY OF LIPASE: CPT

## 2022-03-09 PROCEDURE — 99284 EMERGENCY DEPT VISIT MOD MDM: CPT

## 2022-03-09 PROCEDURE — 80053 COMPREHEN METABOLIC PANEL: CPT

## 2022-03-09 PROCEDURE — 96361 HYDRATE IV INFUSION ADD-ON: CPT

## 2022-03-09 PROCEDURE — 84443 ASSAY THYROID STIM HORMONE: CPT

## 2022-03-09 PROCEDURE — 99284 EMERGENCY DEPT VISIT MOD MDM: CPT | Performed by: EMERGENCY MEDICINE

## 2022-03-09 PROCEDURE — 85007 BL SMEAR W/DIFF WBC COUNT: CPT

## 2022-03-09 PROCEDURE — 80061 LIPID PANEL: CPT | Performed by: PHYSICIAN ASSISTANT

## 2022-03-09 PROCEDURE — 81025 URINE PREGNANCY TEST: CPT

## 2022-03-09 PROCEDURE — 81001 URINALYSIS AUTO W/SCOPE: CPT

## 2022-03-09 PROCEDURE — 96374 THER/PROPH/DIAG INJ IV PUSH: CPT

## 2022-03-09 RX ORDER — MAGNESIUM HYDROXIDE/ALUMINUM HYDROXICE/SIMETHICONE 120; 1200; 1200 MG/30ML; MG/30ML; MG/30ML
30 SUSPENSION ORAL ONCE
Status: COMPLETED | OUTPATIENT
Start: 2022-03-09 | End: 2022-03-09

## 2022-03-09 RX ORDER — LOPERAMIDE HYDROCHLORIDE 2 MG/1
2 CAPSULE ORAL 4 TIMES DAILY PRN
Qty: 12 CAPSULE | Refills: 0 | Status: SHIPPED | OUTPATIENT
Start: 2022-03-09 | End: 2022-03-11

## 2022-03-09 RX ORDER — LIDOCAINE HYDROCHLORIDE 20 MG/ML
10 SOLUTION OROPHARYNGEAL ONCE
Status: COMPLETED | OUTPATIENT
Start: 2022-03-09 | End: 2022-03-09

## 2022-03-09 RX ORDER — ONDANSETRON 4 MG/1
4 TABLET, ORALLY DISINTEGRATING ORAL EVERY 6 HOURS PRN
Qty: 20 TABLET | Refills: 0 | Status: SHIPPED | OUTPATIENT
Start: 2022-03-09 | End: 2022-03-11

## 2022-03-09 RX ORDER — ONDANSETRON 2 MG/ML
4 INJECTION INTRAMUSCULAR; INTRAVENOUS ONCE
Status: COMPLETED | OUTPATIENT
Start: 2022-03-09 | End: 2022-03-09

## 2022-03-09 RX ADMIN — ONDANSETRON 4 MG: 2 INJECTION INTRAMUSCULAR; INTRAVENOUS at 09:35

## 2022-03-09 RX ADMIN — SODIUM CHLORIDE 1000 ML: 0.9 INJECTION, SOLUTION INTRAVENOUS at 09:34

## 2022-03-09 RX ADMIN — ALUMINA, MAGNESIA, AND SIMETHICONE ORAL SUSPENSION REGULAR STRENGTH 30 ML: 1200; 1200; 120 SUSPENSION ORAL at 09:35

## 2022-03-09 RX ADMIN — LIDOCAINE HYDROCHLORIDE 10 ML: 20 SOLUTION ORAL; TOPICAL at 09:35

## 2022-03-09 NOTE — ED PROVIDER NOTES
History  Chief Complaint   Patient presents with    Abdominal Pain     Pt states that she ate an egg sandwhich yesterday and then started with abdominal pain, diarrhea and nausea  Patient is a 69-year-old female with a past medical history of hypertension, anxiety and depression who presents to emergency department with complaint of left-sided abdominal pain  The patient reports that her pain began yesterday afternoon shortly after eating an exam which  She describes her pain as located in her left upper quadrant and radiated up under her ribs, that is a stabbing and burning pain that she initially rated a 10/10 but currently rates an 8/10  Patient also endorses shortness of breath with nausea and multiple episodes of nonbloody diarrhea  She also reports one episode of nonbloody vomit  The patient denies fever, chest pain, headache, change in vision, lightheadedness, numbness, tingling, dysuria, hematuria or vaginal discharge  Prior to Admission Medications   Prescriptions Last Dose Informant Patient Reported? Taking? Retin-A 0 025 % cream   No No   Sig: Apply topically daily at bedtime Spread one pea-sized amount of medication over entire face about one hour before bedtime  Patient not taking: Reported on 3/8/2022    albuterol (ProAir HFA) 90 mcg/act inhaler   No No   Sig: Inhale 2 puffs every 6 (six) hours as needed for wheezing   amitriptyline (ELAVIL) 10 mg tablet   No No   Sig: Take 1 tablet (10 mg total) by mouth daily at bedtime   benzonatate (TESSALON) 200 MG capsule   No No   Sig: Take 1 capsule (200 mg total) by mouth 3 (three) times a day as needed for cough   budesonide (Pulmicort Flexhaler) 90 MCG/ACT inhaler   No No   Sig: Inhale 1 puff 2 (two) times a day Rinse mouth after use     Patient not taking: Reported on 1/7/2022    busPIRone (BUSPAR) 5 mg tablet  Self No No   Sig: Take 1 tablet (5 mg total) by mouth 2 (two) times a day   Patient not taking: Reported on 1/7/2022 doxycycline monohydrate (MONODOX) 100 mg capsule   No No   Sig: Take 1 capsule (100 mg)  after breakfast and take 1 capsule (100 mg) after dinner  Take it with a full glass of water  Patient not taking: Reported on 3/8/2022    hydrOXYzine HCL (ATARAX) 25 mg tablet   No No   Sig: TAKE 1 TABLET BY MOUTH EVERY 8 HOURS AS NEEDED FOR ANXIETY      Facility-Administered Medications: None       Past Medical History:   Diagnosis Date    Anxiety     Depression     Hypertension        History reviewed  No pertinent surgical history  Family History   Problem Relation Age of Onset    Cancer Father         non hodgkins lymphoma    Cancer Paternal Grandmother     Cancer Paternal Grandfather      I have reviewed and agree with the history as documented  E-Cigarette/Vaping    E-Cigarette Use Never User      E-Cigarette/Vaping Substances    Nicotine No     THC No     CBD No     Flavoring No     Other No     Unknown No      Social History     Tobacco Use    Smoking status: Heavy Tobacco Smoker     Packs/day: 0 50     Years: 1 20     Pack years: 0 60     Types: Cigarettes    Smokeless tobacco: Never Used    Tobacco comment: 8 cigarettes per day, No passive smoke exposure   Vaping Use    Vaping Use: Never used   Substance Use Topics    Alcohol use: Not Currently    Drug use: Never        Review of Systems   Constitutional: Positive for appetite change  Negative for chills and fever  HENT: Negative for congestion, ear pain, rhinorrhea and sore throat  Eyes: Negative for pain and visual disturbance  Respiratory: Negative for cough and shortness of breath  Cardiovascular: Negative for chest pain and palpitations  Gastrointestinal: Positive for abdominal pain, diarrhea, nausea and vomiting  Endocrine: Negative  Genitourinary: Negative for dysuria, frequency, hematuria and urgency  Musculoskeletal: Negative for arthralgias, back pain, neck pain and neck stiffness     Skin: Negative for color change and rash  Allergic/Immunologic: Negative  Neurological: Negative for seizures, syncope, weakness, light-headedness, numbness and headaches  Hematological: Negative  Psychiatric/Behavioral: Negative  All other systems reviewed and are negative  Physical Exam  ED Triage Vitals [03/09/22 0908]   Temperature Pulse Respirations Blood Pressure SpO2   98 1 °F (36 7 °C) 82 16 109/58 100 %      Temp Source Heart Rate Source Patient Position - Orthostatic VS BP Location FiO2 (%)   Oral -- Lying Right arm --      Pain Score       8             Orthostatic Vital Signs  Vitals:    03/09/22 0908   BP: 109/58   Pulse: 82   Patient Position - Orthostatic VS: Lying       Physical Exam  Vitals and nursing note reviewed  Constitutional:       General: She is not in acute distress  Appearance: She is well-developed and normal weight  She is not ill-appearing  HENT:      Head: Normocephalic and atraumatic  Mouth/Throat:      Mouth: Mucous membranes are moist       Pharynx: Oropharynx is clear  Eyes:      Extraocular Movements: Extraocular movements intact  Conjunctiva/sclera: Conjunctivae normal       Pupils: Pupils are equal, round, and reactive to light  Cardiovascular:      Rate and Rhythm: Normal rate and regular rhythm  Heart sounds: Normal heart sounds  No murmur heard  Pulmonary:      Effort: Pulmonary effort is normal  No respiratory distress  Breath sounds: Normal breath sounds  Abdominal:      General: Abdomen is flat  Bowel sounds are normal  There is no distension or abdominal bruit  Palpations: Abdomen is soft  There is no hepatomegaly, splenomegaly, mass or pulsatile mass  Tenderness: There is abdominal tenderness in the right upper quadrant, epigastric area and left upper quadrant  There is no right CVA tenderness, left CVA tenderness, guarding or rebound  Negative signs include Sullivan's sign and McBurney's sign  Hernia: No hernia is present  Musculoskeletal:      Cervical back: Neck supple  Skin:     General: Skin is warm and dry  Capillary Refill: Capillary refill takes less than 2 seconds  Coloration: Skin is not jaundiced  Findings: No erythema or rash  Neurological:      General: No focal deficit present  Mental Status: She is alert and oriented to person, place, and time  Cranial Nerves: No cranial nerve deficit  Motor: No weakness           ED Medications  Medications   ondansetron (ZOFRAN) injection 4 mg (4 mg Intravenous Given 3/9/22 0935)   sodium chloride 0 9 % bolus 1,000 mL (1,000 mL Intravenous New Bag 3/9/22 0934)   aluminum-magnesium hydroxide-simethicone (MYLANTA) oral suspension 30 mL (30 mL Oral Given 3/9/22 0935)   Lidocaine Viscous HCl (XYLOCAINE) 2 % mucosal solution 10 mL (10 mL Swish & Swallow Given 3/9/22 0935)       Diagnostic Studies  Results Reviewed     Procedure Component Value Units Date/Time    POCT pregnancy, urine [[id number]]  (Normal) Resulted: 03/09/22 1150    Lab Status: Final result Updated: 03/09/22 1150     EXT PREG TEST UR (Ref: Negative) negative     Control valid    Urine Macroscopic, POC [[id number]]  (Abnormal) Collected: 03/09/22 1143    Lab Status: Final result Specimen: Urine Updated: 03/09/22 1145     Color, UA Yellow     Clarity, UA Clear     pH, UA 6 0     Leukocytes, UA Negative     Nitrite, UA Negative     Protein,  (2+) mg/dl      Glucose, UA Negative mg/dl      Ketones, UA Trace mg/dl      Urobilinogen, UA 1 0 E U /dl      Bilirubin, UA Interference- unable to analyze     Blood, UA Large     Specific Gravity, UA >=1 030    Narrative:      CLINITEK RESULT    Urine Microscopic [[id number]] Collected: 03/09/22 1143    Lab Status: No result Specimen: Urine, Other     CBC and differential [[id number]]  (Normal) Collected: 03/09/22 0934    Lab Status: Final result Specimen: Blood from Arm, Right Updated: 03/09/22 1038     WBC 4 55 Thousand/uL      RBC 4 65 Million/uL Hemoglobin 14 5 g/dL      Hematocrit 41 6 %      MCV 90 fL      MCH 31 2 pg      MCHC 34 9 g/dL      RDW 12 3 %      MPV 12 3 fL      Platelets 521 Thousands/uL     Narrative: This is an appended report  These results have been appended to a previously verified report      Manual Differential(PHLEBS Do Not Order) [[id number]]  (Abnormal) Collected: 03/09/22 0934    Lab Status: Final result Specimen: Blood from Arm, Right Updated: 03/09/22 1038     Segmented % 64 %      Bands % 2 %      Lymphocytes % 16 %      Monocytes % 10 %      Eosinophils, % 7 %      Basophils % 0 %      Atypical Lymphocytes % 1 %      Absolute Neutrophils 3 00 Thousand/uL      Lymphocytes Absolute 0 73 Thousand/uL      Monocytes Absolute 0 45 Thousand/uL      Eosinophils Absolute 0 32 Thousand/uL      Basophils Absolute 0 00 Thousand/uL      Total Counted --     RBC Morphology Normal     Platelet Estimate Adequate    Comprehensive metabolic panel [[id number]] Collected: 03/09/22 0934    Lab Status: Final result Specimen: Blood from Arm, Right Updated: 03/09/22 1022     Sodium 140 mmol/L      Potassium 4 0 mmol/L      Chloride 105 mmol/L      CO2 25 mmol/L      ANION GAP 10 mmol/L      BUN 12 mg/dL      Creatinine 0 67 mg/dL      Glucose 85 mg/dL      Calcium 8 7 mg/dL      AST 15 U/L      ALT 23 U/L      Alkaline Phosphatase 54 U/L      Total Protein 7 6 g/dL      Albumin 4 2 g/dL      Total Bilirubin 0 69 mg/dL      eGFR 118 ml/min/1 73sq m     Narrative:      [place] guidelines for Chronic Kidney Disease (CKD):     Stage 1 with normal or high GFR (GFR > 90 mL/min/1 73 square meters)    Stage 2 Mild CKD (GFR = 60-89 mL/min/1 73 square meters)    Stage 3A Moderate CKD (GFR = 45-59 mL/min/1 73 square meters)    Stage 3B Moderate CKD (GFR = 30-44 mL/min/1 73 square meters)    Stage 4 Severe CKD (GFR = 15-29 mL/min/1 73 square meters)    Stage 5 End Stage CKD (GFR <15 mL/min/1 73 square meters)  Note: GFR calculation is accurate only with a steady state creatinine    Lipase [[id number]]  (Normal) Collected: 03/09/22 0934    Lab Status: Final result Specimen: Blood from Arm, Right Updated: 03/09/22 1022     Lipase 101 u/L                  No orders to display         Procedures  Procedures      ED Course  ED Course as of 03/09/22 1158   Wed Mar 09, 2022   1122 Patient reported mild tenderness in the lower quadrants on repeat physical exam but states that she just finished her menstrual period yesterday and believes this is residual cramping  The patient does not wish to receive CT imaging of her abdomen at this time states that she would like to go home  1153 Patient urine preg was negative  Patient will be discharged home follow-up with her PCP on outpatient basis  I have discussed the risks of going without imaging of her abdomen patient says that she feels overall well and would like to go home at this time  She will be discharged with a prescription for Zofran  Further instructions per discharge orders  SBIRT 22yo+      Most Recent Value   SBIRT (22 yo +)    In order to provide better care to our patients, we are screening all of our patients for alcohol and drug use  Would it be okay to ask you these screening questions? No Filed at: 03/09/2022 5667                [facility]  Number of Diagnoses or Management Options  Diagnosis management comments: Patient is a 23-year-old female with a past medical history of hypertension, anxiety and depression who presents to emergency department with complaint of left-sided abdominal pain  Upon physical exam the patient is mildly tender in her left upper quadrant, mid epigastric and right upper quadrants so I have ordered a CBC, CMP, lipase as well as a UA and pregnancy exam   I have also ordered IV Zofran, 1 L normal saline and a GI cocktail for pain management at this time  Labs pending        Disposition  Final diagnoses:   Generalized abdominal pain   Gastroenteritis   Nausea and vomiting   Diarrhea     Time reflects when diagnosis was documented in both MDM as applicable and the Disposition within this note     Time User Action Codes Description Comment    3/9/2022 11:54 AM Arlyne Im Add [R10 84] Generalized abdominal pain     3/9/2022 11:54 AM Arlyne Im Add [K52 9] Gastroenteritis     3/9/2022 11:54 AM Buddie Prema, Idalmis Shelling Add [R11 2] Nausea and vomiting     3/9/2022 11:54 AM Arlyne Im Add [R19 7] Diarrhea       ED Disposition     ED Disposition Condition Date/Time Comment    Discharge Stable Wed Mar 9, 2022 11:25 AM Maxim Greco discharge to home/self care  Follow-up Information     Follow up With Specialties Details Why Contact Info Additional Information    Christy Lu PA-C Family Medicine, Physician Assistant Schedule an appointment as soon as possible for a visit  for abdominal pain with nausea and vomiting  [place] [address] Emergency Department Emergency Medicine  As needed [address] Emergency Department,  [address], [place], [facility]          Patient's Medications   Discharge Prescriptions    LOPERAMIDE (IMODIUM) 2 MG CAPSULE    Take 1 capsule (2 mg total) by mouth 4 (four) times a day as needed for diarrhea       Start Date: 3/9/2022  End Date: --       Order Dose: 2 mg       Quantity: 12 capsule    Refills: 0    ONDANSETRON (ZOFRAN ODT) 4 MG DISINTEGRATING TABLET    Take 1 tablet (4 mg total) by mouth every 6 (six) hours as needed for nausea or vomiting       Start Date: 3/9/2022  End Date: --       Order Dose: 4 mg       Quantity: 20 tablet    Refills: 0     No discharge procedures on file  PDMP Review     None           ED Provider  Attending physically available and evaluated Maxim Greco I managed the patient along with the ED Attending      Electronically Signed by         Tulio Bullard DO  03/09/22 5544

## 2022-03-09 NOTE — Clinical Note
Pati Sharma was seen and treated in our emergency department on 3/9/2022  Diagnosis:     Ely Beltran  may return to work on return date  She may return on this date: 03/11/2022         If you have any questions or concerns, please don't hesitate to call        Juice Solis, DO    ______________________________           _______________          _______________  Hospital Representative                              Date                                Time

## 2022-03-09 NOTE — DISCHARGE INSTRUCTIONS
Patient has been given a prescription for Zofran encouraged to take 1 tablet by mouth every 8 hours as needed for nausea  She has also been instructed to take Tylenol and Motrin as needed every 6 hours for pain or fever, with small meals to avoid upset stomach  She has also been instructed to drink plenty of fluid including Pedialyte and Gatorade  Patient has been instructed follow-up with her PCP in the next 48 hours for symptoms do not begin to resolve  She has also been instructed return to the emergency department should she develop severe abdominal pain, vaginal discharge, syncope, lightheadedness, and intractable vomiting, fever or any other symptoms she finds concerning

## 2022-03-10 NOTE — ED ATTENDING ATTESTATION
3/9/2022  IBerto MD, saw and evaluated the patient  I have discussed the patient with the resident/non-physician practitioner and agree with the resident's/non-physician practitioner's findings, Plan of Care, and MDM as documented in the resident's/non-physician practitioner's note, except where noted  All available labs and Radiology studies were reviewed  I was present for key portions of any procedure(s) performed by the resident/non-physician practitioner and I was immediately available to provide assistance  At this point I agree with the current assessment done in the Emergency Department    I have conducted an independent evaluation of this patient a history and physical is as follows:see h and p above-- durign repeat abd exam pt with suprapubic tendenress er rec ct scan of abd/plvis- pt does not want ct scan at this time feels improved- wants to go home- pt given strict d/c insrtuctions to return to  The er     ED Course  ED Course as of 03/10/22 1237   Wed Mar 09, 2022   0946 Er md note-  pt is low risk  for pe by wells score - score of 0- perc-neg   1021 ER MD NOTE- PT OFFERED PAIN MEDICATION REFUSES AT THIS POINT- WILL CONTINUE TO [address] [facility] Time  Procedures
No

## 2022-03-11 ENCOUNTER — OFFICE VISIT (OUTPATIENT)
Dept: FAMILY MEDICINE CLINIC | Facility: CLINIC | Age: 32
End: 2022-03-11
Payer: MEDICARE

## 2022-03-11 VITALS
WEIGHT: 174.4 LBS | HEIGHT: 67 IN | BODY MASS INDEX: 27.37 KG/M2 | OXYGEN SATURATION: 99 % | SYSTOLIC BLOOD PRESSURE: 100 MMHG | DIASTOLIC BLOOD PRESSURE: 70 MMHG | TEMPERATURE: 95.8 F | HEART RATE: 71 BPM

## 2022-03-11 DIAGNOSIS — E83.51 HYPOCALCEMIA: ICD-10-CM

## 2022-03-11 DIAGNOSIS — F41.1 ANXIETY STATE: ICD-10-CM

## 2022-03-11 DIAGNOSIS — Z13.220 LIPID SCREENING: ICD-10-CM

## 2022-03-11 DIAGNOSIS — Z72.0 TOBACCO ABUSE: ICD-10-CM

## 2022-03-11 DIAGNOSIS — K21.9 GASTROESOPHAGEAL REFLUX DISEASE, UNSPECIFIED WHETHER ESOPHAGITIS PRESENT: ICD-10-CM

## 2022-03-11 DIAGNOSIS — Z00.00 WELL ADULT EXAM: Primary | ICD-10-CM

## 2022-03-11 DIAGNOSIS — D72.819 LEUKOPENIA, UNSPECIFIED TYPE: ICD-10-CM

## 2022-03-11 LAB
CHOLEST SERPL-MCNC: 161 MG/DL
HDLC SERPL-MCNC: 48 MG/DL
LDLC SERPL CALC-MCNC: 100 MG/DL (ref 0–100)
NONHDLC SERPL-MCNC: 113 MG/DL
TRIGL SERPL-MCNC: 65 MG/DL
TSH SERPL DL<=0.05 MIU/L-ACNC: 1.11 UIU/ML (ref 0.36–3.74)

## 2022-03-11 PROCEDURE — 99395 PREV VISIT EST AGE 18-39: CPT | Performed by: PHYSICIAN ASSISTANT

## 2022-03-11 PROCEDURE — 99214 OFFICE O/P EST MOD 30 MIN: CPT | Performed by: PHYSICIAN ASSISTANT

## 2022-03-11 RX ORDER — PANTOPRAZOLE SODIUM 40 MG/1
40 TABLET, DELAYED RELEASE ORAL
Qty: 90 TABLET | Refills: 1 | Status: SHIPPED | OUTPATIENT
Start: 2022-03-11 | End: 2022-05-10 | Stop reason: SDUPTHER

## 2022-03-11 RX ORDER — BUSPIRONE HYDROCHLORIDE 5 MG/1
5 TABLET ORAL 2 TIMES DAILY
Qty: 180 TABLET | Refills: 0 | Status: SHIPPED | OUTPATIENT
Start: 2022-03-11

## 2022-03-11 RX ORDER — COVID-19 TEST SPECIMEN COLLECT
MISCELLANEOUS MISCELLANEOUS
COMMUNITY
Start: 2022-03-07

## 2022-03-11 NOTE — PROGRESS NOTES
[address] [facility] PRIMARY CARE [facility]    NAME: Ashley Early  AGE: 28 y o  SEX: female  : 1990     DATE: 3/11/2022     Assessment and Plan:     Problem List Items Addressed This Visit     None          Immunizations and preventive care screenings were discussed with patient today  Appropriate education was printed on patient's after visit summary  Counseling:  · {Annual Physical; Counselin}         No follow-ups on file  Chief Complaint:     Chief Complaint   Patient presents with    Physical Exam      History of Present Illness:     Adult Annual Physical   Patient here for a comprehensive physical exam  The patient reports {problems:01872}  Diet and Physical Activity  · Diet/Nutrition: {annual physical; diet:}  · Exercise: {annual physical; exercise:2102}  Depression Screening  PHQ-2/9 Depression Screening         General Health  · Sleep: {annual physical; sleep:2102}  · Hearing: {annual physical; hearin}  · Vision: {annual physical; vision:}  · Dental: {annual physical; dental:}  /GYN Health  · Last menstrual period: ***  · Contraceptive method: {contraceptive options:}  · History of STDs?: {YES/NO:}  Review of Systems:     Review of Systems   Past Medical History:     Past Medical History:   Diagnosis Date    Anxiety     Depression     Hypertension       Past Surgical History:     No past surgical history on file     Social History:     Social History     Socioeconomic History    Marital status: Single     Spouse name: Not on file    Number of children: Not on file    Years of education: Not on file    Highest education level: Not on file   Occupational History    Occupation: NOT EMPLOYED   Tobacco Use    Smoking status: Heavy Tobacco Smoker     Packs/day: 0 50     Years: 1 20     Pack years: 0 60     Types: Cigarettes    Smokeless tobacco: Never Used    Tobacco comment: 8 cigarettes per day, No passive smoke exposure   Vaping Use    Vaping Use: Never used   Substance and Sexual Activity    Alcohol use: Not Currently    Drug use: Never    Sexual activity: Yes     Partners: Male   Other Topics Concern    Not on file   Social History Narrative    Not on file     Social Determinants of Health     Financial Resource Strain: Not on file   Food Insecurity: Not on file   Transportation Needs: Not on file   Physical Activity: Not on file   Stress: Not on file   Social Connections: Not on file   Intimate Partner Violence: Not on file   Housing Stability: Not on file      Family History:     Family History   Problem Relation Age of Onset    Cancer Father         non hodgkins lymphoma    Cancer Paternal Grandmother     Cancer Paternal Grandfather       Current Medications:     Current Outpatient Medications   Medication Sig Dispense Refill    albuterol (ProAir HFA) 90 mcg/act inhaler Inhale 2 puffs every 6 (six) hours as needed for wheezing 8 5 g 0    amitriptyline (ELAVIL) 10 mg tablet Take 1 tablet (10 mg total) by mouth daily at bedtime 30 tablet 1    benzonatate (TESSALON) 200 MG capsule Take 1 capsule (200 mg total) by mouth 3 (three) times a day as needed for cough 20 capsule 0    budesonide (Pulmicort Flexhaler) 90 MCG/ACT inhaler Inhale 1 puff 2 (two) times a day Rinse mouth after use  (Patient not taking: Reported on 1/7/2022 ) 1 each 0    busPIRone (BUSPAR) 5 mg tablet Take 1 tablet (5 mg total) by mouth 2 (two) times a day (Patient not taking: Reported on 1/7/2022 ) 180 tablet 0    doxycycline monohydrate (MONODOX) 100 mg capsule Take 1 capsule (100 mg)  after breakfast and take 1 capsule (100 mg) after dinner  Take it with a full glass of water   (Patient not taking: Reported on 3/8/2022 ) 180 capsule 0    hydrOXYzine HCL (ATARAX) 25 mg tablet TAKE 1 TABLET BY MOUTH EVERY 8 HOURS AS NEEDED FOR ANXIETY 90 tablet 0    loperamide (IMODIUM) 2 mg capsule Take 1 capsule (2 mg total) by mouth 4 (four) times a day as needed for diarrhea 12 capsule 0    ondansetron (Zofran ODT) 4 mg disintegrating tablet Take 1 tablet (4 mg total) by mouth every 6 (six) hours as needed for nausea or vomiting 20 tablet 0    Retin-A 0 025 % cream Apply topically daily at bedtime Spread one pea-sized amount of medication over entire face about one hour before bedtime  (Patient not taking: Reported on 3/8/2022 ) 45 g 6     No current facility-administered medications for this visit  Allergies:      Allergies   Allergen Reactions    No Known Allergies       Physical Exam:     Ht 5' 7" (1 702 m)   BMI 25 45 kg/m²     Physical Exam     Dane Correa

## 2022-03-11 NOTE — PROGRESS NOTES
Assessment/Plan:    -I did encourage her to keep weaning down on cigarette use  I did advise her that vaping can be worse than cigarette smoking  -routine physical in 1 year    BMI Counseling: Body mass index is 27 31 kg/m²  The BMI is above normal  Nutrition recommendations include reducing portion sizes and decreasing overall calorie intake  Exercise recommendations include exercising 3-5 times per week  Tobacco Cessation Counseling: Tobacco cessation counseling and education was provided  The patient is sincerely urged to quit consumption of tobacco  She is ready to quit tobacco  The numerous health risks of tobacco consumption were discussed  She does smoke 8 cigarettes daily  She has been weaning herself down on cigarette use  She also started vaping  This was discussed for about 5 minutes  Diagnoses and all orders for this visit:    Well adult exam    Tobacco abuse    Other orders  -     COVID-19 Specimen Collection KIT; TEST AS DIRECTED TODAY (Patient not taking: Reported on 3/11/2022)          Subjective:      Patient ID: Jose Reina is a 28 y o  female  Patient presents today for her annual physical   Her chronic medical problems will be addressed as a separate visit today  She does see the dentist every 6 months  She was unsure if she needed a tooth pulled but she did get a 2nd opinion and was told that it was not needed  She will be seeing a specialist in [place] for TMJ   Denies any vision problems   Denies any hearing problems   She does eat a healthy diet   She does exercise at the gym or walks at least 4 times a week   She continues to smoke at least 8 cigarettes daily  She states that she slowly weaning herself down on cigarettes    She started vaping thinking this would also help with smoking cessation  Denies any alcohol or drug use      The following portions of the patient's history were reviewed and updated as appropriate:   She  has a past medical history of Anxiety, Depression, and Hypertension  She   Patient Active Problem List    Diagnosis Date Noted    COVID-19 virus infection 01/07/2022    Musculoskeletal chest pain 01/07/2022    Skin lesion 10/04/2021    Arthralgia of right temporomandibular joint 09/13/2021    Neck pain 09/13/2021    Numbness and tingling in right hand     Chronic headaches 09/02/2021    Right leg paresthesias 08/05/2021    Paresthesias 08/05/2021    Tobacco abuse 07/09/2021    Laryngopharyngeal reflux (LPR) 07/09/2021    Gastroesophageal reflux disease 07/09/2021    Carpal tunnel syndrome of right wrist 07/08/2021    Migraine without aura, with intractable migraine, so stated, with status migrainosus 10/01/2020    Right hand pain 10/01/2020    Encounter for screening for HIV 10/01/2020    Lipid screening 10/01/2020    Diabetes mellitus screening 10/01/2020    Well adult exam 06/23/2020    Anxiety state 05/12/2015     She  has no past surgical history on file  Her family history includes Cancer in her father, paternal grandfather, and paternal grandmother  She  reports that she has been smoking cigarettes  She has a 0 60 pack-year smoking history  She has never used smokeless tobacco  She reports previous alcohol use  She reports that she does not use drugs  Current Outpatient Medications   Medication Sig Dispense Refill    hydrOXYzine HCL (ATARAX) 25 mg tablet TAKE 1 TABLET BY MOUTH EVERY 8 HOURS AS NEEDED FOR ANXIETY 90 tablet 0    amitriptyline (ELAVIL) 10 mg tablet Take 1 tablet (10 mg total) by mouth daily at bedtime (Patient not taking: Reported on 3/11/2022 ) 30 tablet 1    COVID-19 Specimen Collection KIT TEST AS DIRECTED TODAY (Patient not taking: Reported on 3/11/2022)      doxycycline monohydrate (MONODOX) 100 mg capsule Take 1 capsule (100 mg)  after breakfast and take 1 capsule (100 mg) after dinner  Take it with a full glass of water   (Patient not taking: Reported on 3/8/2022 ) 180 capsule 0    loperamide (IMODIUM) 2 mg capsule Take 1 capsule (2 mg total) by mouth 4 (four) times a day as needed for diarrhea (Patient not taking: Reported on 3/11/2022 ) 12 capsule 0    ondansetron (Zofran ODT) 4 mg disintegrating tablet Take 1 tablet (4 mg total) by mouth every 6 (six) hours as needed for nausea or vomiting (Patient not taking: Reported on 3/11/2022 ) 20 tablet 0    Retin-A 0 025 % cream Apply topically daily at bedtime Spread one pea-sized amount of medication over entire face about one hour before bedtime  (Patient not taking: Reported on 3/8/2022 ) 45 g 6     No current facility-administered medications for this visit  Current Outpatient Medications on File Prior to Visit   Medication Sig    hydrOXYzine HCL (ATARAX) 25 mg tablet TAKE 1 TABLET BY MOUTH EVERY 8 HOURS AS NEEDED FOR ANXIETY    amitriptyline (ELAVIL) 10 mg tablet Take 1 tablet (10 mg total) by mouth daily at bedtime (Patient not taking: Reported on 3/11/2022 )    COVID-19 Specimen Collection KIT TEST AS DIRECTED TODAY (Patient not taking: Reported on 3/11/2022)    doxycycline monohydrate (MONODOX) 100 mg capsule Take 1 capsule (100 mg)  after breakfast and take 1 capsule (100 mg) after dinner  Take it with a full glass of water  (Patient not taking: Reported on 3/8/2022 )    loperamide (IMODIUM) 2 mg capsule Take 1 capsule (2 mg total) by mouth 4 (four) times a day as needed for diarrhea (Patient not taking: Reported on 3/11/2022 )    ondansetron (Zofran ODT) 4 mg disintegrating tablet Take 1 tablet (4 mg total) by mouth every 6 (six) hours as needed for nausea or vomiting (Patient not taking: Reported on 3/11/2022 )    Retin-A 0 025 % cream Apply topically daily at bedtime Spread one pea-sized amount of medication over entire face about one hour before bedtime   (Patient not taking: Reported on 3/8/2022 )    [DISCONTINUED] albuterol (ProAir HFA) 90 mcg/act inhaler Inhale 2 puffs every 6 (six) hours as needed for wheezing    [DISCONTINUED] benzonatate (TESSALON) 200 MG capsule Take 1 capsule (200 mg total) by mouth 3 (three) times a day as needed for cough    [DISCONTINUED] budesonide (Pulmicort Flexhaler) 90 MCG/ACT inhaler Inhale 1 puff 2 (two) times a day Rinse mouth after use  (Patient not taking: Reported on 1/7/2022 )    [DISCONTINUED] busPIRone (BUSPAR) 5 mg tablet Take 1 tablet (5 mg total) by mouth 2 (two) times a day (Patient not taking: Reported on 1/7/2022 )     No current facility-administered medications on file prior to visit  She is allergic to no known allergies       Review of Systems      Objective:      /70 (BP Location: Left arm, Patient Position: Sitting, Cuff Size: Adult)   Pulse 71   Temp (!) 95 8 °F (35 4 °C)   Ht 5' 7" (1 702 m)   Wt 79 1 kg (174 lb 6 4 oz)   LMP 03/04/2022 (Exact Date)   SpO2 99%   BMI 27 31 kg/m²          Physical Exam  Vitals reviewed  Constitutional:       General: She is not in acute distress  Appearance: Normal appearance  She is well-developed  She is not ill-appearing, toxic-appearing or diaphoretic  HENT:      Head: Normocephalic and atraumatic  Neck:      Thyroid: No thyromegaly  Comments: No palpable thyroid enlargement  Cardiovascular:      Rate and Rhythm: Normal rate and regular rhythm  Heart sounds: Normal heart sounds  No murmur heard  No friction rub  No gallop  Pulmonary:      Effort: Pulmonary effort is normal  No respiratory distress  Breath sounds: Normal breath sounds  No wheezing, rhonchi or rales  Abdominal:      General: Bowel sounds are normal       Palpations: Abdomen is soft  There is no mass  Tenderness: There is no abdominal tenderness  Musculoskeletal:         General: No deformity  Cervical back: Neck supple  Right lower leg: No edema  Left lower leg: No edema  Lymphadenopathy:      Cervical: No cervical adenopathy  Skin:     General: Skin is warm     Neurological:      General: No focal deficit present  Mental Status: She is alert  Psychiatric:         Mood and Affect: Mood normal          Behavior: Behavior normal          Thought Content:  Thought content normal          Judgment: Judgment normal

## 2022-03-11 NOTE — PROGRESS NOTES
Assessment/Plan:    Annual physical has been done as a separate visit today  I did review her most recent blood work     -she does have a decrease calcium level  I did order a CMP   -her white count is decreased also I did order a repeat CBC   -I did recommend a screening lipid panel and I also added a TSH for anxiety  -start pantoprazole of 40 mg daily since she did not notice any improvement with omeprazole with her reflux symptoms   -refer to gastroenterology   -she will continue hydroxyzine as needed for anxiety  She will also continue on the BuSpar   -recommend follow-up in 3 months    M*Modal software was used to dictate this note  It may contain errors with dictating incorrect words/spelling  Please contact provider directly for any questions  Diagnoses and all orders for this visit:    Well adult exam    Tobacco abuse    Gastroesophageal reflux disease, unspecified whether esophagitis present  -     Ambulatory Referral to Gastroenterology; Future  -     Comprehensive metabolic panel  -     pantoprazole (PROTONIX) 40 mg tablet; Take 1 tablet (40 mg total) by mouth daily before breakfast    Lipid screening  -     Lipid panel    Leukopenia, unspecified type  -     CBC and differential    Hypocalcemia  -     Comprehensive metabolic panel    Anxiety state  -     CBC and differential  -     Comprehensive metabolic panel  -     TSH, 3rd generation with Free T4 reflex; Future  -     busPIRone (BUSPAR) 5 mg tablet; Take 1 tablet (5 mg total) by mouth 2 (two) times a day    Other orders  -     COVID-19 Specimen Collection KIT; TEST AS DIRECTED TODAY (Patient not taking: Reported on 3/11/2022)          Subjective:      Patient ID: Tuan Mojica is a 28 y o  female  Patient presents today because she was in the emergency room 2 days ago for abdominal pain and vomiting after eating an egg sandwich  She was given Zofran and she states that her symptoms have resolved  She no longer has any abdominal pain  She did see an ENT specialist who did a scope on her and encouraged her to start omeprazole for reflux  She states that she did not notice any difference with the omeprazole  She has not tried any other medications  She has not seen a GI specialist or had an endoscopy  She did question if possibly she has hyperthyroidism  She denies any weight loss, palpitations  In the past she was also on propanolol and she thought she was on this for blood pressure control  Her blood pressure is excellent at this time  She no longer takes the medication  She has never had a TSH level done  The following portions of the patient's history were reviewed and updated as appropriate:   She  has a past medical history of Anxiety, Depression, and Hypertension  She   Patient Active Problem List    Diagnosis Date Noted    Leukopenia 03/11/2022    Hypocalcemia 03/11/2022    COVID-19 virus infection 01/07/2022    Musculoskeletal chest pain 01/07/2022    Skin lesion 10/04/2021    Arthralgia of right temporomandibular joint 09/13/2021    Neck pain 09/13/2021    Numbness and tingling in right hand     Chronic headaches 09/02/2021    Right leg paresthesias 08/05/2021    Paresthesias 08/05/2021    Tobacco abuse 07/09/2021    Laryngopharyngeal reflux (LPR) 07/09/2021    Gastroesophageal reflux disease 07/09/2021    Carpal tunnel syndrome of right wrist 07/08/2021    Migraine without aura, with intractable migraine, so stated, with status migrainosus 10/01/2020    Right hand pain 10/01/2020    Encounter for screening for HIV 10/01/2020    Lipid screening 10/01/2020    Diabetes mellitus screening 10/01/2020    Well adult exam 06/23/2020    Anxiety state 05/12/2015     She  has no past surgical history on file  Her family history includes Cancer in her father, paternal grandfather, and paternal grandmother  She  reports that she has been smoking cigarettes  She has a 0 60 pack-year smoking history   She has never used smokeless tobacco  She reports previous alcohol use  She reports that she does not use drugs  Current Outpatient Medications   Medication Sig Dispense Refill    hydrOXYzine HCL (ATARAX) 25 mg tablet TAKE 1 TABLET BY MOUTH EVERY 8 HOURS AS NEEDED FOR ANXIETY 90 tablet 0    amitriptyline (ELAVIL) 10 mg tablet Take 1 tablet (10 mg total) by mouth daily at bedtime (Patient not taking: Reported on 3/11/2022 ) 30 tablet 1    busPIRone (BUSPAR) 5 mg tablet Take 1 tablet (5 mg total) by mouth 2 (two) times a day 180 tablet 0    COVID-19 Specimen Collection KIT TEST AS DIRECTED TODAY (Patient not taking: Reported on 3/11/2022)      doxycycline monohydrate (MONODOX) 100 mg capsule Take 1 capsule (100 mg)  after breakfast and take 1 capsule (100 mg) after dinner  Take it with a full glass of water  (Patient not taking: Reported on 3/8/2022 ) 180 capsule 0    pantoprazole (PROTONIX) 40 mg tablet Take 1 tablet (40 mg total) by mouth daily before breakfast 90 tablet 1    Retin-A 0 025 % cream Apply topically daily at bedtime Spread one pea-sized amount of medication over entire face about one hour before bedtime  (Patient not taking: Reported on 3/8/2022 ) 45 g 6     No current facility-administered medications for this visit  Current Outpatient Medications on File Prior to Visit   Medication Sig    hydrOXYzine HCL (ATARAX) 25 mg tablet TAKE 1 TABLET BY MOUTH EVERY 8 HOURS AS NEEDED FOR ANXIETY    amitriptyline (ELAVIL) 10 mg tablet Take 1 tablet (10 mg total) by mouth daily at bedtime (Patient not taking: Reported on 3/11/2022 )    COVID-19 Specimen Collection KIT TEST AS DIRECTED TODAY (Patient not taking: Reported on 3/11/2022)    doxycycline monohydrate (MONODOX) 100 mg capsule Take 1 capsule (100 mg)  after breakfast and take 1 capsule (100 mg) after dinner  Take it with a full glass of water   (Patient not taking: Reported on 3/8/2022 )    Retin-A 0 025 % cream Apply topically daily at bedtime Spread one pea-sized amount of medication over entire face about one hour before bedtime  (Patient not taking: Reported on 3/8/2022 )    [DISCONTINUED] albuterol (ProAir HFA) 90 mcg/act inhaler Inhale 2 puffs every 6 (six) hours as needed for wheezing    [DISCONTINUED] benzonatate (TESSALON) 200 MG capsule Take 1 capsule (200 mg total) by mouth 3 (three) times a day as needed for cough    [DISCONTINUED] budesonide (Pulmicort Flexhaler) 90 MCG/ACT inhaler Inhale 1 puff 2 (two) times a day Rinse mouth after use  (Patient not taking: Reported on 1/7/2022 )    [DISCONTINUED] busPIRone (BUSPAR) 5 mg tablet Take 1 tablet (5 mg total) by mouth 2 (two) times a day (Patient not taking: Reported on 1/7/2022 )    [DISCONTINUED] loperamide (IMODIUM) 2 mg capsule Take 1 capsule (2 mg total) by mouth 4 (four) times a day as needed for diarrhea (Patient not taking: Reported on 3/11/2022 )    [DISCONTINUED] ondansetron (Zofran ODT) 4 mg disintegrating tablet Take 1 tablet (4 mg total) by mouth every 6 (six) hours as needed for nausea or vomiting (Patient not taking: Reported on 3/11/2022 )     No current facility-administered medications on file prior to visit  She is allergic to no known allergies       Review of Systems   Constitutional: Negative  Respiratory: Negative for cough and shortness of breath  Cardiovascular: Negative for chest pain  Gastrointestinal: Negative for abdominal pain, diarrhea, nausea and vomiting  Psychiatric/Behavioral:        She is on hydroxyzine as needed for anxiety         Objective:      /70 (BP Location: Left arm, Patient Position: Sitting, Cuff Size: Adult)   Pulse 71   Temp (!) 95 8 °F (35 4 °C)   Ht 5' 7" (1 702 m)   Wt 79 1 kg (174 lb 6 4 oz)   LMP 03/04/2022 (Exact Date)   SpO2 99%   BMI 27 31 kg/m²          Physical Exam  Vitals reviewed  Constitutional:       General: She is not in acute distress  Appearance: Normal appearance  She is well-developed   She is not ill-appearing, toxic-appearing or diaphoretic  HENT:      Head: Normocephalic and atraumatic  Neck:      Thyroid: No thyromegaly  Comments: No palpable thyroid enlargement  Cardiovascular:      Rate and Rhythm: Normal rate and regular rhythm  Heart sounds: Normal heart sounds  No murmur heard  No friction rub  No gallop  Pulmonary:      Effort: Pulmonary effort is normal  No respiratory distress  Breath sounds: Normal breath sounds  No wheezing, rhonchi or rales  Abdominal:      General: Bowel sounds are normal       Palpations: Abdomen is soft  There is no mass  Tenderness: There is no abdominal tenderness  Musculoskeletal:         General: No deformity  Cervical back: Neck supple  Right lower leg: No edema  Left lower leg: No edema  Lymphadenopathy:      Cervical: No cervical adenopathy  Skin:     General: Skin is warm  Neurological:      General: No focal deficit present  Mental Status: She is alert  Psychiatric:         Mood and Affect: Mood normal          Behavior: Behavior normal          Thought Content:  Thought content normal          Judgment: Judgment normal

## 2022-03-23 ENCOUNTER — HOSPITAL ENCOUNTER (OUTPATIENT)
Dept: RADIOLOGY | Facility: IMAGING CENTER | Age: 32
Discharge: HOME | End: 2022-03-23
Payer: MEDICARE

## 2022-03-23 DIAGNOSIS — R20.2 PARESTHESIAS: ICD-10-CM

## 2022-03-23 DIAGNOSIS — R20.2 RIGHT LEG PARESTHESIAS: ICD-10-CM

## 2022-03-23 PROCEDURE — 72148 MRI LUMBAR SPINE W/O DYE: CPT

## 2022-03-23 PROCEDURE — G1004 CDSM NDSC: HCPCS

## 2022-03-25 ENCOUNTER — TELEPHONE (OUTPATIENT)
Dept: NEUROLOGY | Facility: CLINIC | Age: 32
End: 2022-03-25

## 2022-03-25 NOTE — TELEPHONE ENCOUNTER
Called patient, made her aware of MRI result  Patient verbalized understanding and denies further questions   Patient will be starting PT soon

## 2022-03-25 NOTE — TELEPHONE ENCOUNTER
----- Message from Lurdes July, 10 Aguilar Cintron sent at 3/25/2022  9:37 AM EDT -----  Please let the patient know her MRI was normal other then a slightly bulging disc but should not cause symptoms  Recommend continued PT

## 2022-03-29 ENCOUNTER — EVALUATION (OUTPATIENT)
Dept: PHYSICAL THERAPY | Age: 32
End: 2022-03-29
Payer: MEDICARE

## 2022-03-29 DIAGNOSIS — M54.16 LUMBAR RADICULOPATHY: Primary | ICD-10-CM

## 2022-03-29 DIAGNOSIS — R53.1 RIGHT SIDED WEAKNESS: ICD-10-CM

## 2022-03-29 PROCEDURE — 97162 PT EVAL MOD COMPLEX 30 MIN: CPT | Performed by: PHYSICAL THERAPIST

## 2022-03-29 PROCEDURE — 97110 THERAPEUTIC EXERCISES: CPT | Performed by: PHYSICAL THERAPIST

## 2022-03-29 NOTE — PROGRESS NOTES
PT Evaluation     Today's date: 3/29/2022  Patient name: Zoie Ulloa  : 1990  MRN: [id number]  Referring provider: CINTHYA Pemberton  Dx:   Encounter Diagnosis     ICD-10-CM    1  Lumbar radiculopathy  M54 16    2  Right sided weakness  R53 1 Ambulatory Referral to Physical Therapy       Start Time: 0815  Stop Time: 0900  Total time in clinic (min): 45 minutes    Assessment  Assessment details: Pt is a 27 y/o female who presents with chronic thoracic and lumbar  spine pain  No further referral is necessary at this time  Pt was given PNE on the affects of mental stress on the body increasing neural sensitivity  Pt is experiencing pain, decreased strength, and decreased ROM  Pt has a fair prognosis due to chronicity of symptoms  Pt would benefit from PT to address these impairments leading to increased functional capacity and improved quality of life  Impairments: abnormal or restricted ROM, impaired physical strength, lacks appropriate home exercise program, pain with function, poor posture  and poor body mechanics  Understanding of Dx/Px/POC: good   Prognosis: good    Goals  Short Term Goals: to be achieved by 4 weeks  1) Patient to be independent with basic HEP  2) Decrease pain to 2/10 at its worst   3) Increase lumbar spine ROM to minimal deceits in all deficient planes  4) Increase LE strength by 1/2 MMT grade in all deficient planes  Long Term Goals: to be achieved by discharge  1) FOTO equal to or greater than 68   2) Patient to be independent with comprehensive HEP  3) Lumbar spine ROM WNL all planes to improve a/iadls  4) Increase LE strength to 5/5 MMT grade in all planes to improve a/iadls  5) Patient to report no sleep interruption secondary to pain  6) Increase ambulatory tolerance to 40 min  Plan  Plan details: Patient was encouraged to schedule for 1 month and then reassessment will take place     Patient would benefit from: skilled physical therapy  Planned modality interventions: cryotherapy and thermotherapy: hydrocollator packs  Planned therapy interventions: neuromuscular re-education, patient education, stretching, strengthening, therapeutic activities, therapeutic exercise, therapeutic training, home exercise program and graded activity  Frequency: Twice a week for 10 weeks  Treatment plan discussed with: patient        Subjective Evaluation    History of Present Illness  Mechanism of injury: Pt reports chronic pain starting 1 year ago  Patient states it's on the left side, a deep burning sensation that starts in her neck and goes down to her toes  Patient also states that on her recent vacation she went on for a week she had no pain at all until she returned home  She reports a lot of stress related to her job working at [employer]  Patient has two children (7 and 11) who she cares for and enjoys going to the gym  Quality of life: good    Pain  Current pain ratin  At best pain ratin  At worst pain ratin  Quality: sharp, radiating and dull ache  Aggravating factors: stair climbing, sitting, standing and walking (working)    Hand dominance: right    Patient Goals  Patient goals for therapy: decreased pain, independence with ADLs/IADLs, return to sport/leisure activities, increased strength and increased motion          Objective     Concurrent Complaints  Negative for night pain, disturbed sleep, bladder dysfunction, bowel dysfunction, saddle (S4) numbness, cardiac problem, kidney problem, gallbladder problem, stomach problem, ulcer, appendix problem, spleen problem, pancreas problem, history of cancer, history of trauma and infection    Tenderness     Lumbar Spine  Tenderness in the facet joint       Active Range of Motion     Lumbar   Flexion:  Restriction level: minimal  Extension:  with pain Restriction level: moderate  Left lateral flexion:  Restriction level: moderate  Right lateral flexion:  Restriction level: moderate  Left rotation:  Restriction level: moderate  Right rotation:  Restriction level: moderate    Joint Play     Hypomobile: L3, L4 and L5     Strength/Myotome Testing     Lumbar   Left   Heel walk: normal  Toe walk: normal    Right   Heel walk: normal  Toe walk: normal    Right Hip   Planes of Motion   Flexion: 4  Extension: 4    Right Knee   Flexion: 4  Extension: 4    Muscle Activation   Patient able to activate left transverse abdominals  Tests     Lumbar     Left   Positive passive SLR and slump test      Right   Negative passive SLR  Additional Tests Details  Sensation intact B/L: no red flag symptoms                Precautions: Anxiety       Manuals                                                                 Neuro Re-Ed                                                                                                        Ther Ex 3/29            R SLR HEP            L SKTC HEP            Open books HEP                                                                             Ther Activity                                       Gait Training                                       Modalities

## 2022-04-05 ENCOUNTER — OFFICE VISIT (OUTPATIENT)
Dept: PHYSICAL THERAPY | Age: 32
End: 2022-04-05
Payer: MEDICARE

## 2022-04-05 DIAGNOSIS — R53.1 RIGHT SIDED WEAKNESS: ICD-10-CM

## 2022-04-05 DIAGNOSIS — M54.16 LUMBAR RADICULOPATHY: Primary | ICD-10-CM

## 2022-04-05 PROCEDURE — 97140 MANUAL THERAPY 1/> REGIONS: CPT | Performed by: PHYSICAL THERAPIST

## 2022-04-05 PROCEDURE — 97110 THERAPEUTIC EXERCISES: CPT | Performed by: PHYSICAL THERAPIST

## 2022-04-05 PROCEDURE — 97112 NEUROMUSCULAR REEDUCATION: CPT | Performed by: PHYSICAL THERAPIST

## 2022-04-05 NOTE — PROGRESS NOTES
Daily Note     Today's date: 2022  Patient name: Jaziel Kwan  : 1990  MRN: [id number]  Referring provider: CINTHYA Humphries  Dx:   Encounter Diagnosis     ICD-10-CM    1  Lumbar radiculopathy  M54 16    2  Right sided weakness  R53 1        Start Time: 08  Stop Time: 930  Total time in clinic (min): 45 minutes    Subjective: Pt reports no new symptoms  Pt continues to report that the higher the amount of stress she has working at [employer], the higher her pain levels  Objective: See treatment diary below      Assessment: Tolerated treatment well  Pt's POC was progressed to include more core stabilization interventions  Pt has chronic pain and POC is focused on strengthening R side and stretching L  Patient demonstrated fatigue post treatment and would benefit from continued PT      Plan: Continue per plan of care        Precautions: Anxiety       Manuals             Left hip PROM  JF           Left hip caudal glides  JF                                     Neuro Re-Ed             R SLR with ppt  3*10           Supine Clamshell TB  3*10*3 grn                                                                            Ther Ex 3/29 4/4                        L SKTC HEP 20*10"           Open books HEP 10*5" b/l           Upright bike  10'           Bridge  3*10           Mini squats  3*10                                     Ther Activity                                       Gait Training                                       Modalities

## 2022-04-06 ENCOUNTER — OFFICE VISIT (OUTPATIENT)
Dept: PHYSICAL THERAPY | Age: 32
End: 2022-04-06
Payer: MEDICARE

## 2022-04-06 DIAGNOSIS — R53.1 RIGHT SIDED WEAKNESS: ICD-10-CM

## 2022-04-06 DIAGNOSIS — M54.16 LUMBAR RADICULOPATHY: Primary | ICD-10-CM

## 2022-04-06 PROCEDURE — 97110 THERAPEUTIC EXERCISES: CPT | Performed by: PHYSICAL THERAPIST

## 2022-04-06 PROCEDURE — 97112 NEUROMUSCULAR REEDUCATION: CPT | Performed by: PHYSICAL THERAPIST

## 2022-04-06 PROCEDURE — 97140 MANUAL THERAPY 1/> REGIONS: CPT | Performed by: PHYSICAL THERAPIST

## 2022-04-06 NOTE — PROGRESS NOTES
Daily Note     Today's date: 2022  Patient name: Tawnya Klinefelter  : 1990  MRN: [id number]  Referring provider: CINTHYA Diaz  Dx:   Encounter Diagnosis     ICD-10-CM    1  Lumbar radiculopathy  M54 16    2  Right sided weakness  R53 1                   Subjective: Reports no soreness following therapy yesterday but had pain at her work shift last night  Objective: See treatment diary below      Assessment: Tolerated treatment well  Patient would benefit from continued PT, did well with exercises with no complaints of pain throughout tx today  Plan: Continue per plan of care        Precautions: Anxiety       Manuals             Left hip PROM  JF CW          Left hip caudal glides  JF CW                                     Neuro Re-Ed             R SLR with ppt  3*10 3x10           Supine Clamshell TB  3*10*3 grn 30x3" grn                                                                            Ther Ex 3/29 4/4                        L SKTC HEP 20*10" 20x10"           Open books HEP 10*5" b/l 10x10" ea           Upright bike  10' 10'           Bridge  3*10 30x3"           Mini squats  3*10 3x10                                     Ther Activity                                       Gait Training                                       Modalities

## 2022-04-11 ENCOUNTER — OFFICE VISIT (OUTPATIENT)
Dept: PHYSICAL THERAPY | Age: 32
End: 2022-04-11
Payer: MEDICARE

## 2022-04-11 DIAGNOSIS — M54.16 LUMBAR RADICULOPATHY: Primary | ICD-10-CM

## 2022-04-11 DIAGNOSIS — R53.1 RIGHT SIDED WEAKNESS: ICD-10-CM

## 2022-04-11 PROCEDURE — 97112 NEUROMUSCULAR REEDUCATION: CPT | Performed by: PHYSICAL THERAPIST

## 2022-04-11 PROCEDURE — 97140 MANUAL THERAPY 1/> REGIONS: CPT | Performed by: PHYSICAL THERAPIST

## 2022-04-11 PROCEDURE — 97110 THERAPEUTIC EXERCISES: CPT | Performed by: PHYSICAL THERAPIST

## 2022-04-11 NOTE — PROGRESS NOTES
Daily Note     Today's date: 2022  Patient name: Frankey Forget  : 1990  MRN: [id number]  Referring provider: CINTHYA Lobo  Dx:   Encounter Diagnosis     ICD-10-CM    1  Lumbar radiculopathy  M54 16    2  Right sided weakness  R53 1        Start Time: 0800  Stop Time: 0845  Total time in clinic (min): 45 minutes      Subjective: Pt reports no new symptoms  Objective: See treatment diary below      Assessment: Tolerated treatment well  Pt's POC progressed to include more advanced hip abductor strengthening  Patient demonstrated fatigue post treatment and would benefit from continued PT      Plan: Continue per plan of care        Precautions: Anxiety       Manuals           Left hip PROM  JF CW JF         Left hip caudal glides  JF CW  JF                                   Neuro Re-Ed             R SLR with ppt  3*10 3x10  3*10         Supine Clamshell TB  3*10*3 grn 30x3" grn           Lateral walks     3 laps                                                             Ther Ex 3/29 4/4                        L SKTC HEP 20*10" 20x10"  20*10"         Open books HEP 10*5" b/l 10x10" ea  15x10" ea          Upright bike  10' 10'  10'         Bridge  3*10 30x3"  30*3"         Mini squats  3*10 3x10  3*10                                   Ther Activity                                       Gait Training                                       Modalities

## 2022-04-14 ENCOUNTER — APPOINTMENT (OUTPATIENT)
Dept: PHYSICAL THERAPY | Age: 32
End: 2022-04-14
Payer: MEDICARE

## 2022-04-18 ENCOUNTER — APPOINTMENT (OUTPATIENT)
Dept: PHYSICAL THERAPY | Age: 32
End: 2022-04-18
Payer: MEDICARE

## 2022-04-19 ENCOUNTER — OFFICE VISIT (OUTPATIENT)
Dept: PHYSICAL THERAPY | Age: 32
End: 2022-04-19
Payer: MEDICARE

## 2022-04-19 DIAGNOSIS — R53.1 RIGHT SIDED WEAKNESS: ICD-10-CM

## 2022-04-19 DIAGNOSIS — M54.16 LUMBAR RADICULOPATHY: Primary | ICD-10-CM

## 2022-04-19 PROCEDURE — 97140 MANUAL THERAPY 1/> REGIONS: CPT

## 2022-04-19 PROCEDURE — 97110 THERAPEUTIC EXERCISES: CPT

## 2022-04-19 PROCEDURE — 97112 NEUROMUSCULAR REEDUCATION: CPT

## 2022-04-19 NOTE — PROGRESS NOTES
Daily Note     Today's date: 2022  Patient name: Dina Stephens  : 1990  MRN: [id number]  Referring provider: CINTHYA Haro  Dx:   Encounter Diagnosis     ICD-10-CM    1  Lumbar radiculopathy  M54 16    2  Right sided weakness  R53 1                   Subjective:    "patient states 'lifting heavy things clearly hurt my back and cause permanent damage so I have to quit my job'  Patient was educated on graded activity and 'pain vs  harm' "   Pt reports she felt a little less sore after today's session    Objective: See treatment diary below      Assessment: progressed ex as toya focusing on functional mobility, pt with slightly less guarded mobility at end of session      Plan: Continue per plan of care  Progress treatment as tolerated  Precautions: Anxiety       Manuals  22        Left hip PROM  JF CW JF VK        Left hip caudal glides  JF CW  JF                                   Neuro Re-Ed  22        R SLR with ppt  3*10 3x10  3*10 p!         Supine Clamshell TB  3*10*3 grn 30x3" grn           Lateral walks     3 laps gtb 4x20'        Monster walks     gtb 4x20'                                               Ther Ex 3/29 4/4                        L SKTC HEP 20*10" 20x10"  20*10" 10x10"        Open books HEP 10*5" b/l 10x10" ea  15x10" ea  10x10" ea        Upright bike  10' 10'  10' 5'        Bridge  3*10 30x3"  30*3" 2x10x3"        Mini squats  3*10 3x10  3*10 2x10x3"        Vigor gym     lvl 10 2'                     Ther Activity                                       Gait Training                                       Modalities

## 2022-04-21 ENCOUNTER — OFFICE VISIT (OUTPATIENT)
Dept: PHYSICAL THERAPY | Age: 32
End: 2022-04-21
Payer: MEDICARE

## 2022-04-21 DIAGNOSIS — M54.16 LUMBAR RADICULOPATHY: Primary | ICD-10-CM

## 2022-04-21 DIAGNOSIS — R53.1 RIGHT SIDED WEAKNESS: ICD-10-CM

## 2022-04-21 PROCEDURE — 97110 THERAPEUTIC EXERCISES: CPT

## 2022-04-21 PROCEDURE — 97140 MANUAL THERAPY 1/> REGIONS: CPT

## 2022-04-21 PROCEDURE — 97112 NEUROMUSCULAR REEDUCATION: CPT

## 2022-04-21 NOTE — PROGRESS NOTES
Daily Note     Today's date: 2022  Patient name: Patti Kerns  : 1990  MRN: [id number]  Referring provider: CINTHYA Cooney  Dx:   Encounter Diagnosis     ICD-10-CM    1  Lumbar radiculopathy  M54 16    2  Right sided weakness  R53 1                   Subjective: pt reports feeling worse today vs last session, pt reports she now has pain in mid thoracic and cont to have tingling B legs (L feels tight, R feels weak/tingling), pt reports nothing gives her relief at this time      Objective: See treatment diary below      Assessment:  Pt performed ex with less guarding despite subject reports, pt had some difficulty performing squats stating she has to focus on her legs when performing ex because they are weak      Plan: Continue per plan of care  Progress treatment as tolerated  Precautions: Anxiety       Manuals  22   Left hip PROM  JF CW JF VK VK   Left hip caudal glides  JF CW  JF                       Neuro Re-Ed  22   R SLR with ppt  3*10 3x10  3*10 p!     Supine Clamshell TB  3*10*3 grn 30x3" grn       Lateral walks     3 laps gtb 4x20' 4x20' gtb   Monster walks     gtb 4x20' 4x20' gtb                              Ther Ex 3/29 4/4                L SKTC HEP 20*10" 20x10"  20*10" 10x10" 10x10"   Open books HEP 10*5" b/l 10x10" ea  15x10" ea  10x10" ea 10x10" ea   Upright bike  10' 10'  10' 5' 10'   Bridge  3*10 30x3"  30*3" 2x10x3" 2x10x3"   Mini squats  3*10 3x10  3*10 2x10x3" 2x10x5"   Vigor gym     lvl 10 2' lvl 10 2x2'            Ther Activity                           Gait Training                           Modalities

## 2022-04-25 ENCOUNTER — OFFICE VISIT (OUTPATIENT)
Dept: PHYSICAL THERAPY | Age: 32
End: 2022-04-25
Payer: MEDICARE

## 2022-04-25 DIAGNOSIS — M54.16 LUMBAR RADICULOPATHY: Primary | ICD-10-CM

## 2022-04-25 DIAGNOSIS — R53.1 RIGHT SIDED WEAKNESS: ICD-10-CM

## 2022-04-25 PROCEDURE — 97140 MANUAL THERAPY 1/> REGIONS: CPT | Performed by: PHYSICAL THERAPIST

## 2022-04-25 PROCEDURE — 97110 THERAPEUTIC EXERCISES: CPT | Performed by: PHYSICAL THERAPIST

## 2022-04-25 PROCEDURE — 97112 NEUROMUSCULAR REEDUCATION: CPT | Performed by: PHYSICAL THERAPIST

## 2022-04-25 NOTE — PROGRESS NOTES
Daily Note     Today's date: 2022  Patient name: Dina Stephens  : 1990  MRN: [id number]  Referring provider: CINTHYA Haro  Dx:   Encounter Diagnosis     ICD-10-CM    1  Lumbar radiculopathy  M54 16    2  Right sided weakness  R53 1                    Subjective: Pt reports no new symptoms  After discussing POC with patient she states "yeah I don't think this is going to go away "       Objective: See treatment diary below      Assessment: Tolerated treatment well  Pt's POC was modified to include more manuals due to decrease symptoms  Patient demonstrated fatigue post treatment and would benefit from continued PT      Plan: Continue per plan of care  Potential discharge next sessions to HEP  Precautions: Anxiety       Manuals  22   Left hip PROM  JF CW JF VK VK JF   Left hip caudal glides  JF CW  JF   JF   LAD                    Neuro Re-Ed  22    R SLR with ppt  3*10 3x10  3*10 p!      Supine Clamshell TB  3*10*3 grn 30x3" grn        Lateral walks     3 laps gtb 4x20' 4x20' gtb    Monster walks     gtb 4x20' 4x20' gtb                                  Ther Ex 3/29 4/4                  L SKTC HEP 20*10" 20x10"  20*10" 10x10" 10x10" 10*10"   Open books HEP 10*5" b/l 10x10" ea  15x10" ea  10x10" ea 10x10" ea 10*10"   Upright bike  10' 10'  10' 5' 10' 10'   Bridge  3*10 30x3"  30*3" 2x10x3" 2x10x3"    Mini squats  3*10 3x10  3*10 2x10x3" 2x10x5" 2*10*3"   Vigor gym     lvl 10 2' lvl 10 2x2'    SLR       2*10 B/L   Ther Activity                              Gait Training                              Modalities

## 2022-04-28 ENCOUNTER — APPOINTMENT (OUTPATIENT)
Dept: PHYSICAL THERAPY | Age: 32
End: 2022-04-28
Payer: MEDICARE

## 2022-05-04 DIAGNOSIS — R51.9 CHRONIC NONINTRACTABLE HEADACHE, UNSPECIFIED HEADACHE TYPE: ICD-10-CM

## 2022-05-04 DIAGNOSIS — G89.29 CHRONIC NONINTRACTABLE HEADACHE, UNSPECIFIED HEADACHE TYPE: ICD-10-CM

## 2022-05-05 RX ORDER — AMITRIPTYLINE HYDROCHLORIDE 10 MG/1
TABLET, FILM COATED ORAL
Qty: 30 TABLET | Refills: 1 | Status: SHIPPED | OUTPATIENT
Start: 2022-05-05

## 2022-05-10 ENCOUNTER — TELEPHONE (OUTPATIENT)
Dept: GASTROENTEROLOGY | Facility: AMBULARY SURGERY CENTER | Age: 32
End: 2022-05-10

## 2022-05-10 ENCOUNTER — CONSULT (OUTPATIENT)
Dept: GASTROENTEROLOGY | Facility: AMBULARY SURGERY CENTER | Age: 32
End: 2022-05-10
Payer: MEDICARE

## 2022-05-10 VITALS
HEART RATE: 74 BPM | SYSTOLIC BLOOD PRESSURE: 104 MMHG | OXYGEN SATURATION: 100 % | BODY MASS INDEX: 27.56 KG/M2 | DIASTOLIC BLOOD PRESSURE: 68 MMHG | RESPIRATION RATE: 18 BRPM | WEIGHT: 175.6 LBS | HEIGHT: 67 IN

## 2022-05-10 DIAGNOSIS — K21.9 GASTROESOPHAGEAL REFLUX DISEASE, UNSPECIFIED WHETHER ESOPHAGITIS PRESENT: ICD-10-CM

## 2022-05-10 PROCEDURE — 99204 OFFICE O/P NEW MOD 45 MIN: CPT | Performed by: INTERNAL MEDICINE

## 2022-05-10 RX ORDER — PANTOPRAZOLE SODIUM 40 MG/1
40 TABLET, DELAYED RELEASE ORAL
Qty: 90 TABLET | Refills: 2 | Status: SHIPPED | OUTPATIENT
Start: 2022-05-10

## 2022-05-10 NOTE — PATIENT INSTRUCTIONS
Scheduled date of EGD(as of today): 8/1/22  Physician performing EGD: Dr Aleyda Mendiola  Location of EGD: [place] [address]  Instructions reviewed with patient by: Sridhar Feng  Clearances:  None

## 2022-05-10 NOTE — TELEPHONE ENCOUNTER
Patient is scheduled for EGD on August 1 , 2022 at [facility]  with Melodie Vail MD  Patient is aware of pre-procedure prep of Nothing to eat of drink after midnight and they will be called the day prior between 2 and 6 pm for time to report for procedure  Pre-procedure prep has been given to the patient  in person  on May 10, 2022

## 2022-05-10 NOTE — LETTER
May 10, 2022     Dina Castellanos PA-C  [address]  [place] [address]    Patient: Ang Cabrera   YOB: 1990   Date of Visit: 5/10/2022       Dear Dr Ronald Hickey: Thank you for referring Amanda Smith to me for evaluation  Below are my notes for this consultation  If you have questions, please do not hesitate to call me  I look forward to following your patient along with you  Sincerely,        Yovany Shankar MD        CC: No Recipients  Yovany Shankar MD  5/10/2022  9:01 AM  Sign when Signing Visit  Consultation - [facility]  Ang Cabrera 28 y o  female MRN: [id number]          Assessment & Plan:    45-year-old female, history of migraines, reports having postprandial epigastric and left upper quadrant pain with abdominal bloating over the last several months  Occasional dysphagia    1  Abdominal bloating and discomfort: Differential including peptic ulcer disease, nonulcer dyspepsia, celiac, reflux  -recommend empiric trial with pantoprazole, suggested that she take it regularly on a daily basis   -we will check a celiac panel  -schedule patient for an upper endoscopy to evaluate for upper GI pathology    2  Intermittent dysphagia: To solid foods, differential including EOE, reflux mediated dysphagia, Schatzki's ring  -we will schedule upper endoscopy as noted above  -discussed with her risks of procedure including bleeding, surgery, perforation, missed polyp detection rate        Trish Swanson was seen today for abdominal pain  Diagnoses and all orders for this visit:    Gastroesophageal reflux disease, unspecified whether esophagitis present  -     Ambulatory Referral to Gastroenterology  -     Celiac Disease Antibody Profile; Future  -     pantoprazole (PROTONIX) 40 mg tablet; Take 1 tablet (40 mg total) by mouth daily before breakfast  -     EGD;  Future            _____________________________________________________________        CC:  Abdominal pain    HPI: Bonnie Angeles is a 28 y  o female who was referred for evaluation of postprandial abdominal pain  This is a pleasant 28-year-old female, history of migraines, was recently seen in the emergency room were to presented with 1 day of mild nausea vomiting, epigastric and left upper quadrant abdominal pain, no labs or imaging studies  She restarted on PPI therapy in his here for follow-up  She notes that since visiting the emergency room she has been taking the Protonix irregularly about 3 or 4 days per week  She notes that she has had continued postprandial epigastric abdominal pain and bloating  Occasional retrosternal burning and reflux symptoms  She has a history of occasional dysphagia with solid foods which is rare for her over the last 1 year  She denies any nocturnal regurgitation  Through all this she reports having regular bowel movements, denies any diarrhea, constipation, melena, rectal bleeding  Denies any significant NSAID use  He does reports some increased stressors  Continues have postprandial abdominal bloating and discomfort which seems to be her most troublesome symptom  Occasional nausea  Patient is otherwise healthy  Denies any surgical history    She smokes 8 cigarettes per day  Denies any significant alcohol, no history of drug use or marijuana  She works in      Family history is notable for father with non-Hodgkin's lymphoma, mother with breast cancer             ROS:  The remainder of the ROS was negative except for the pertinent positives mentioned in HPI           Allergies: No known allergies    Medications:   Current Outpatient Medications:     amitriptyline (ELAVIL) 10 mg tablet, TAKE 1 TABLET BY MOUTH DAILY AT BEDTIME, Disp: 30 tablet, Rfl: 1    busPIRone (BUSPAR) 5 mg tablet, Take 1 tablet (5 mg total) by mouth 2 (two) times a day, Disp: 180 tablet, Rfl: 0    hydrOXYzine HCL (ATARAX) 25 mg tablet, TAKE 1 TABLET BY MOUTH EVERY 8 HOURS AS NEEDED FOR ANXIETY, Disp: 90 tablet, Rfl: 0    COVID-19 Specimen Collection KIT, TEST AS DIRECTED TODAY (Patient not taking: Reported on 3/11/2022), Disp: , Rfl:     pantoprazole (PROTONIX) 40 mg tablet, Take 1 tablet (40 mg total) by mouth daily before breakfast, Disp: 90 tablet, Rfl: 2    Retin-A 0 025 % cream, Apply topically daily at bedtime Spread one pea-sized amount of medication over entire face about one hour before bedtime  (Patient not taking: Reported on 3/8/2022 ), Disp: 45 g, Rfl: 6'    Past Medical History:   Diagnosis Date    Anxiety     Depression     Hypertension        History reviewed  No pertinent surgical history  Family History   Problem Relation Age of Onset    Cancer Father         non hodgkins lymphoma    Cancer Paternal Grandmother     Cancer Paternal Grandfather         reports that she has been smoking cigarettes  She has a 0 60 pack-year smoking history  She has never used smokeless tobacco  She reports previous alcohol use  She reports that she does not use drugs            Physical Exam:     /68 (BP Location: Right arm, Patient Position: Sitting, Cuff Size: Standard)   Pulse 74   Resp 18   Ht 5' 7" (1 702 m)   Wt 79 7 kg (175 lb 9 6 oz)   SpO2 100%   BMI 27 50 kg/m²     Gen: wn/wd, NAD, healthy-appearing female  HEENT: anicteric, MMM, no cervical LAD  CVS: RRR, no m/r/g  CHEST: CTA b/l  ABD: +BS, soft, NT,ND, no hepatosplenomegaly  EXT: no c/c/e  NEURO: aaox3  SKIN: NO rashes

## 2022-05-10 NOTE — PROGRESS NOTES
Consultation -  [facility]  Maximiliano Lance 28 y o  female MRN: [id number]          Assessment & Plan:    43-year-old female, history of migraines, reports having postprandial epigastric and left upper quadrant pain with abdominal bloating over the last several months  Occasional dysphagia    1  Abdominal bloating and discomfort: Differential including peptic ulcer disease, nonulcer dyspepsia, celiac, reflux  -recommend empiric trial with pantoprazole, suggested that she take it regularly on a daily basis   -we will check a celiac panel  -schedule patient for an upper endoscopy to evaluate for upper GI pathology    2  Intermittent dysphagia: To solid foods, differential including EOE, reflux mediated dysphagia, Schatzki's ring  -we will schedule upper endoscopy as noted above  -discussed with her risks of procedure including bleeding, surgery, perforation, missed polyp detection rate        Janiegeovany Hill was seen today for abdominal pain  Diagnoses and all orders for this visit:    Gastroesophageal reflux disease, unspecified whether esophagitis present  -     Ambulatory Referral to Gastroenterology  -     Celiac Disease Antibody Profile; Future  -     pantoprazole (PROTONIX) 40 mg tablet; Take 1 tablet (40 mg total) by mouth daily before breakfast  -     EGD; Future            _____________________________________________________________        CC:  Abdominal pain    HPI:  Maximiliano Lance is a 28 y  o female who was referred for evaluation of postprandial abdominal pain  This is a pleasant 43-year-old female, history of migraines, was recently seen in the emergency room were to presented with 1 day of mild nausea vomiting, epigastric and left upper quadrant abdominal pain, no labs or imaging studies  She restarted on PPI therapy in his here for follow-up  She notes that since visiting the emergency room she has been taking the Protonix irregularly about 3 or 4 days per week      She notes that she has had continued postprandial epigastric abdominal pain and bloating  Occasional retrosternal burning and reflux symptoms  She has a history of occasional dysphagia with solid foods which is rare for her over the last 1 year  She denies any nocturnal regurgitation  Through all this she reports having regular bowel movements, denies any diarrhea, constipation, melena, rectal bleeding  Denies any significant NSAID use  He does reports some increased stressors  Continues have postprandial abdominal bloating and discomfort which seems to be her most troublesome symptom  Occasional nausea  Patient is otherwise healthy  Denies any surgical history    She smokes 8 cigarettes per day  Denies any significant alcohol, no history of drug use or marijuana  She works in      Family history is notable for father with non-Hodgkin's lymphoma, mother with breast cancer             ROS:  The remainder of the ROS was negative except for the pertinent positives mentioned in HPI  Allergies: No known allergies    Medications:   Current Outpatient Medications:     amitriptyline (ELAVIL) 10 mg tablet, TAKE 1 TABLET BY MOUTH DAILY AT BEDTIME, Disp: 30 tablet, Rfl: 1    busPIRone (BUSPAR) 5 mg tablet, Take 1 tablet (5 mg total) by mouth 2 (two) times a day, Disp: 180 tablet, Rfl: 0    hydrOXYzine HCL (ATARAX) 25 mg tablet, TAKE 1 TABLET BY MOUTH EVERY 8 HOURS AS NEEDED FOR ANXIETY, Disp: 90 tablet, Rfl: 0    COVID-19 Specimen Collection KIT, TEST AS DIRECTED TODAY (Patient not taking: Reported on 3/11/2022), Disp: , Rfl:     pantoprazole (PROTONIX) 40 mg tablet, Take 1 tablet (40 mg total) by mouth daily before breakfast, Disp: 90 tablet, Rfl: 2    Retin-A 0 025 % cream, Apply topically daily at bedtime Spread one pea-sized amount of medication over entire face about one hour before bedtime   (Patient not taking: Reported on 3/8/2022 ), Disp: 45 g, Rfl: 6'    Past Medical History: Diagnosis Date    Anxiety     Depression     Hypertension        History reviewed  No pertinent surgical history  Family History   Problem Relation Age of Onset    Cancer Father         non hodgkins lymphoma    Cancer Paternal Grandmother     Cancer Paternal Grandfather         reports that she has been smoking cigarettes  She has a 0 60 pack-year smoking history  She has never used smokeless tobacco  She reports previous alcohol use  She reports that she does not use drugs            Physical Exam:     /68 (BP Location: Right arm, Patient Position: Sitting, Cuff Size: Standard)   Pulse 74   Resp 18   Ht 5' 7" (1 702 m)   Wt 79 7 kg (175 lb 9 6 oz)   SpO2 100%   BMI 27 50 kg/m²     Gen: wn/wd, NAD, healthy-appearing female  HEENT: anicteric, MMM, no cervical LAD  CVS: RRR, no m/r/g  CHEST: CTA b/l  ABD: +BS, soft, NT,ND, no hepatosplenomegaly  EXT: no c/c/e  NEURO: aaox3  SKIN: NO rashes

## 2022-06-30 DIAGNOSIS — F41.1 ANXIETY STATE: ICD-10-CM

## 2022-06-30 RX ORDER — HYDROXYZINE HYDROCHLORIDE 25 MG/1
TABLET, FILM COATED ORAL
Qty: 90 TABLET | Refills: 0 | Status: SHIPPED | OUTPATIENT
Start: 2022-06-30

## 2022-07-05 ENCOUNTER — OFFICE VISIT (OUTPATIENT)
Dept: DERMATOLOGY | Facility: CLINIC | Age: 32
End: 2022-07-05
Payer: MEDICARE

## 2022-07-05 VITALS — BODY MASS INDEX: 28.09 KG/M2 | WEIGHT: 179 LBS | TEMPERATURE: 98 F | HEIGHT: 67 IN

## 2022-07-05 DIAGNOSIS — L73.0 ACNE SCARRING: ICD-10-CM

## 2022-07-05 DIAGNOSIS — L70.0 ACNE VULGARIS: Primary | ICD-10-CM

## 2022-07-05 DIAGNOSIS — D18.01 HEMANGIOMA OF SKIN: ICD-10-CM

## 2022-07-05 PROCEDURE — 99213 OFFICE O/P EST LOW 20 MIN: CPT | Performed by: DERMATOLOGY

## 2022-07-05 NOTE — PATIENT INSTRUCTIONS
[facility]     We use this service to prescribe medications that are often not covered by insurance  Your physician will send your prescription to the pharmacy  You will receive an email from [email or web address] where you will follow the instructions within the email to provide your billing and shipping information  Your medicine will be shipped directly to you  If you have any questions, you can call [phone] or email [email or web address]

## 2022-07-05 NOTE — PROGRESS NOTES
[facility] Follow Up Note    Patient Name: Tomie Ahumada  Encounter Date: 7/5/2022    Today's Chief Concerns:  [facility] Concern #1:  Follow up acne   Concern #2:  Lesion on left posterior shoulder    Current Medications:    Current Outpatient Medications:     hydrOXYzine HCL (ATARAX) 25 mg tablet, TAKE 1 TABLET BY MOUTH EVERY 8 HOURS AS NEEDED FOR ANXIETY, Disp: 90 tablet, Rfl: 0    amitriptyline (ELAVIL) 10 mg tablet, TAKE 1 TABLET BY MOUTH DAILY AT BEDTIME (Patient not taking: Reported on 7/5/2022), Disp: 30 tablet, Rfl: 1    busPIRone (BUSPAR) 5 mg tablet, Take 1 tablet (5 mg total) by mouth 2 (two) times a day (Patient not taking: Reported on 7/5/2022), Disp: 180 tablet, Rfl: 0    COVID-19 Specimen Collection KIT, TEST AS DIRECTED TODAY (Patient not taking: No sig reported), Disp: , Rfl:     pantoprazole (PROTONIX) 40 mg tablet, Take 1 tablet (40 mg total) by mouth daily before breakfast (Patient not taking: Reported on 7/5/2022), Disp: 90 tablet, Rfl: 2    Retin-A 0 025 % cream, Apply topically daily at bedtime Spread one pea-sized amount of medication over entire face about one hour before bedtime  (Patient not taking: Reported on 3/8/2022 ), Disp: 45 g, Rfl: 6    CONSTITUTIONAL:   Vitals:    07/05/22 1111   Temp: 98 °F (36 7 °C)   TempSrc: Temporal   Weight: 81 2 kg (179 lb)   Height: 5' 7" (1 702 m)         Specific Alerts:    Have you been seen by a [facility]ologist in the last 3 years? YES    Are you pregnant or planning to become pregnant? No    Are you currently or planning to be nursing or breast feeding? No    Allergies   Allergen Reactions    No Known Allergies        May we call your Preferred Phone number to discuss your specific medical information? YES    May we leave a detailed message that includes your specific medical information? YES    Have you traveled outside of the [facility] in the past 3 months?  No    Do you currently have a pacemaker or defibrillator? No    Do you have any artificial heart valves, joints, plates, screws, rods, stents, pins, etc? No   - If Yes, were any placed within the last 2 years? Do you require any medications prior to a surgical procedure? - If Yes, for which procedure? - If Yes, what medications to you require? Are you taking any medications that cause you to bleed more easily ("blood thinners") No    Have you ever experienced a rapid heartbeat with epinephrine? No    Have you ever been treated with "gold" (gold sodium thiomalate) therapy? No    [facility] can help with wrinkles, "laugh lines," facial volume loss, "double chin," "love handles," age spots, and more  Are you interested in learning today about some of the skin enhancement procedures that we offer? (If Yes, please provide more detail) No    Review of Systems:  Have you recently had or currently have any of the following?     · Fever or chills: No  · Night Sweats: No  · Headaches: YES, seeing neurology  · Weight Gain: No  · Weight Loss: No  · Blurry Vision: No  · Nausea: No  · Vomiting: No  · Diarrhea: No  · Blood in Stool: No  · Abdominal Pain: No  · Itchy Skin: No  · Painful Joints: No  · Swollen Joints: No  · Muscle Pain: No  · Irregular Mole: No  · Sun Burn: No  · Dry Skin: No  · Skin Color Changes: No  · Scar or Keloid: No  · Cold Sores/Fever Blisters: No  · Bacterial Infections/MRSA: No  · Anxiety: YES  · Depression: No  · Suicidal or Homicidal Thoughts: No      PSYCH: Normal mood and affect  EYES: Normal conjunctiva  ENT: Normal lips and oral mucosa  CARDIOVASCULAR: No edema  RESPIRATORY: Normal respirations  HEME/LYMPH/IMMUNO:  No regional lymphadenopathy except as noted below in ASSESSMENT AND PLAN BY DIAGNOSIS    FULL ORGAN SYSTEM SKIN EXAM (SKIN)  Face Normal except as noted below in Assessment           Left Arm, shoulder Normal except as noted below in Assessment                               1  ACNE VULGARIS ("COMMON ACNE") ACNE SCARRING       Physical Exam:   Anatomic Location Affected: face   Morphological Description: Multiple acneiform scars  Few papules on chin  Additional History of Present Condition:  Present for 4 years  Patient has used retin a cream 0 025 % , Differin gel and has been on doxyxyline 100 BID for 3 months  Assessment and Plan:   We reviewed the causes of acne, the kinds of acne, and the expected clinical course   We discussed treatment options ranging from over-the-counter products, topical retinoids, antibiotics, BP, hormonal therapies (OCPs/spironolactone), and isotretinoin (Accutane)   We reviewed specific over-the-counter interventions and medications  Recommended typical hygiene measures washing regularly with mild cleanser, and refraining from picking and popping any pimples  Recommended non-comedogenic sunscreen use daily   Expectations of therapy discussed  Side effects, risks and benefits of medications discussed  A comprehensive handout with treatment plan provided  The phone number to call in case of questions or concerns (and instructions to stop medications in such a scenario) was provided   After lengthy discussion of etiology and treatment options, we decided to implement the following personalized treatment plan:      Mornin  Begin compound cream from [employer]   Email address: [email or web address]    We use this service to prescribe medications that are often not covered by insurance  Your physician will send your prescription to the pharmacy  You will receive an email from www [email or web address] where you will follow the instructions within the email to provide your billing and shipping information  Your medicine will be shipped directly to you  If you have any questions, you can call [phone] or email [email or web address]        Make sure all products you use on face are labeled as "non-comedongenic" or "oil-free" or " does not clog pores"  Acne can be frustrating and difficult to treat  Most acne regimens take 2-3 months to see an improvement, so stick with them  Don't give up! As always, call your doctor if you have any concerns about your medications  2   HEMANGIOMA    Physical Exam:   Anatomic Location Affected:  Left posterior shoulder   Morphological Description:  See photo  Dermoscopic features of central papule consistent with cherry or similar angioma with thrombosis within capillary loops       Additional History of Present Condition:  Present for 2-3 years  Lesion "comes and goes" and is painful at times  Current eruption with red halo dates back only three days  Similar outbreaks in past; comes and goes, per patient      Assessment and Plan:  Based on a thorough discussion of this condition and the management approach to it (including a comprehensive discussion of the known risks, side effects and potential benefits of treatment), the patient (family) agrees to implement the following specific plan:   Schedule excision    Scribe Attestation    I,:  Jeana Leggett MA am acting as a scribe while in the presence of the attending physician :       I,:  Cha Quesada MD personally performed the services described in this documentation    as scribed in my presence :

## 2022-07-29 ENCOUNTER — TELEPHONE (OUTPATIENT)
Dept: GASTROENTEROLOGY | Facility: CLINIC | Age: 32
End: 2022-07-29

## 2022-07-29 NOTE — TELEPHONE ENCOUNTER
Patients GI provider:  Dr Joy Means    Number to return call: [phone]    Reason for call: Pt calling to reschedule her EGD      Scheduled procedure/appointment date if applicable: Procedure: 4/52/8043

## 2022-08-26 ENCOUNTER — OFFICE VISIT (OUTPATIENT)
Dept: URGENT CARE | Age: 32
End: 2022-08-26
Payer: MEDICARE

## 2022-08-26 DIAGNOSIS — U07.1 COVID: Primary | ICD-10-CM

## 2022-08-26 PROCEDURE — 99213 OFFICE O/P EST LOW 20 MIN: CPT | Performed by: STUDENT IN AN ORGANIZED HEALTH CARE EDUCATION/TRAINING PROGRAM

## 2022-08-26 RX ORDER — PREDNISONE 10 MG/1
10 TABLET ORAL DAILY
Qty: 21 TABLET | Refills: 0 | Status: SHIPPED | OUTPATIENT
Start: 2022-08-26 | End: 2022-10-20

## 2022-08-26 RX ORDER — NIRMATRELVIR AND RITONAVIR 300-100 MG
3 KIT ORAL 2 TIMES DAILY
Qty: 30 TABLET | Refills: 0 | Status: SHIPPED | OUTPATIENT
Start: 2022-08-26 | End: 2022-08-31

## 2022-08-26 NOTE — PROGRESS NOTES
[facility]        NAME: Jose Reina is a 28 y o  female  : 1990    MRN: [id number]  DATE: 2022  TIME: 6:28 PM    Assessment and Plan   COVID [U07 1]  1  COVID  nirmatrelvir & ritonavir (Paxlovid, 300/100,) tablet therapy pack    predniSONE 10 mg tablet         Patient Instructions       Follow up with PCP in 3-5 days  Proceed to  ER if symptoms worsen  Chief Complaint   No chief complaint on file  COVID      History of Present Illness       HPI  Patient presents today after she states that she had a positive home test for COVID this morning  Patient states her symptoms started about 2 days ago with sore throat and nasal congestion fatigue body aches and chills  Patient is not a fevers or shortness of breath    Review of Systems   Review of Systems  Per HPI    Current Medications       Current Outpatient Medications:     nirmatrelvir & ritonavir (Paxlovid, 300/100,) tablet therapy pack, Take 3 tablets by mouth 2 (two) times a day for 5 days Take 2 nirmatrelvir tablets + 1 ritonavir tablet together per dose, Disp: 30 tablet, Rfl: 0    predniSONE 10 mg tablet, Take 1 tablet (10 mg total) by mouth daily 6 tab day 1, 5 tab day 2, 4 tab day 3, 3 tab day 4, 2 tab day 5, 1 tab day 6, Disp: 21 tablet, Rfl: 0    amitriptyline (ELAVIL) 10 mg tablet, TAKE 1 TABLET BY MOUTH DAILY AT BEDTIME (Patient not taking: Reported on 2022), Disp: 30 tablet, Rfl: 1    busPIRone (BUSPAR) 5 mg tablet, Take 1 tablet (5 mg total) by mouth 2 (two) times a day (Patient not taking: Reported on 2022), Disp: 180 tablet, Rfl: 0    COVID-19 Specimen Collection KIT, TEST AS DIRECTED TODAY (Patient not taking: No sig reported), Disp: , Rfl:     hydrOXYzine HCL (ATARAX) 25 mg tablet, TAKE 1 TABLET BY MOUTH EVERY 8 HOURS AS NEEDED FOR ANXIETY, Disp: 90 tablet, Rfl: 0    pantoprazole (PROTONIX) 40 mg tablet, Take 1 tablet (40 mg total) by mouth daily before breakfast (Patient not taking: Reported on Pt resting as manifested by eyes closed in dark room. Respirations even and easy. Call light and bedside table in reach. Bed in low locked position. Denies any needs at this time. 7/5/2022), Disp: 90 tablet, Rfl: 2    Retin-A 0 025 % cream, Apply topically daily at bedtime Spread one pea-sized amount of medication over entire face about one hour before bedtime  (Patient not taking: Reported on 3/8/2022 ), Disp: 45 g, Rfl: 6    Current Allergies     Allergies as of 08/26/2022 - Reviewed 07/05/2022   Allergen Reaction Noted    No known allergies  10/17/2017            The following portions of the patient's history were reviewed and updated as appropriate: allergies, current medications, past family history, past medical history, past social history, past surgical history and problem list      Past Medical History:   Diagnosis Date    Acne     Anxiety     Depression     Hypertension        No past surgical history on file  Family History   Problem Relation Age of Onset    Cancer Father         non hodgkins lymphoma    Cancer Paternal Grandmother     Cancer Paternal Grandfather          Medications have been verified  Objective   There were no vitals taken for this visit  No LMP recorded  Physical Exam     Physical Exam  Constitutional:       General: She is not in acute distress  Appearance: Normal appearance  HENT:      Head: Normocephalic  Nose: Congestion and rhinorrhea present  Mouth/Throat:      Mouth: Mucous membranes are moist       Pharynx: No oropharyngeal exudate or posterior oropharyngeal erythema  Eyes:      General:         Right eye: No discharge  Left eye: No discharge  Conjunctiva/sclera: Conjunctivae normal    Cardiovascular:      Rate and Rhythm: Normal rate and regular rhythm  Pulses: Normal pulses  Pulmonary:      Effort: Pulmonary effort is normal  No respiratory distress  Abdominal:      General: Abdomen is flat  There is no distension  Palpations: Abdomen is soft  Tenderness: There is no abdominal tenderness  Musculoskeletal:      Cervical back: Neck supple     Lymphadenopathy:      Cervical: Cervical adenopathy present  Skin:     General: Skin is warm  Capillary Refill: Capillary refill takes less than 2 seconds  Neurological:      Mental Status: She is alert and oriented to person, place, and time

## 2022-09-22 DIAGNOSIS — R51.9 CHRONIC NONINTRACTABLE HEADACHE, UNSPECIFIED HEADACHE TYPE: ICD-10-CM

## 2022-09-22 DIAGNOSIS — F41.1 ANXIETY STATE: ICD-10-CM

## 2022-09-22 DIAGNOSIS — G89.29 CHRONIC NONINTRACTABLE HEADACHE, UNSPECIFIED HEADACHE TYPE: ICD-10-CM

## 2022-09-22 RX ORDER — AMITRIPTYLINE HYDROCHLORIDE 10 MG/1
10 TABLET, FILM COATED ORAL
Qty: 30 TABLET | Refills: 0 | Status: SHIPPED | OUTPATIENT
Start: 2022-09-22 | End: 2022-10-20

## 2022-09-22 RX ORDER — HYDROXYZINE HYDROCHLORIDE 25 MG/1
TABLET, FILM COATED ORAL
Qty: 10 TABLET | Refills: 0 | Status: SHIPPED | OUTPATIENT
Start: 2022-09-22 | End: 2022-10-20 | Stop reason: SDUPTHER

## 2022-09-22 NOTE — TELEPHONE ENCOUNTER
I have not seen her since March  She needs an appointment  I did send 10 tablets of the hydroxyzine in the meantime    Thank you

## 2022-10-20 ENCOUNTER — OFFICE VISIT (OUTPATIENT)
Dept: FAMILY MEDICINE CLINIC | Facility: CLINIC | Age: 32
End: 2022-10-20
Payer: MEDICARE

## 2022-10-20 VITALS
SYSTOLIC BLOOD PRESSURE: 118 MMHG | HEIGHT: 67 IN | HEART RATE: 77 BPM | BODY MASS INDEX: 29.19 KG/M2 | OXYGEN SATURATION: 97 % | TEMPERATURE: 98.7 F | WEIGHT: 186 LBS | DIASTOLIC BLOOD PRESSURE: 82 MMHG

## 2022-10-20 DIAGNOSIS — M54.16 LUMBAR RADICULITIS: Primary | ICD-10-CM

## 2022-10-20 DIAGNOSIS — D72.819 LEUKOPENIA, UNSPECIFIED TYPE: ICD-10-CM

## 2022-10-20 DIAGNOSIS — G43.011 MIGRAINE WITHOUT AURA, WITH INTRACTABLE MIGRAINE, SO STATED, WITH STATUS MIGRAINOSUS: ICD-10-CM

## 2022-10-20 DIAGNOSIS — F41.1 ANXIETY STATE: ICD-10-CM

## 2022-10-20 PROCEDURE — 99214 OFFICE O/P EST MOD 30 MIN: CPT | Performed by: FAMILY MEDICINE

## 2022-10-20 RX ORDER — PREDNISONE 20 MG/1
40 TABLET ORAL DAILY
Qty: 10 TABLET | Refills: 0 | Status: SHIPPED | OUTPATIENT
Start: 2022-10-20 | End: 2022-10-25

## 2022-10-20 RX ORDER — DULOXETIN HYDROCHLORIDE 20 MG/1
20 CAPSULE, DELAYED RELEASE ORAL DAILY
Qty: 30 CAPSULE | Refills: 0 | Status: SHIPPED | OUTPATIENT
Start: 2022-10-20

## 2022-10-20 RX ORDER — HYDROXYZINE HYDROCHLORIDE 25 MG/1
TABLET, FILM COATED ORAL
Qty: 30 TABLET | Refills: 1 | Status: SHIPPED | OUTPATIENT
Start: 2022-10-20

## 2022-10-24 ENCOUNTER — APPOINTMENT (OUTPATIENT)
Dept: LAB | Facility: IMAGING CENTER | Age: 32
End: 2022-10-24
Payer: MEDICARE

## 2022-10-24 DIAGNOSIS — M54.16 LUMBAR RADICULITIS: ICD-10-CM

## 2022-10-24 DIAGNOSIS — G43.011 MIGRAINE WITHOUT AURA, WITH INTRACTABLE MIGRAINE, SO STATED, WITH STATUS MIGRAINOSUS: ICD-10-CM

## 2022-10-24 DIAGNOSIS — F41.1 ANXIETY STATE: ICD-10-CM

## 2022-10-24 DIAGNOSIS — D72.819 LEUKOPENIA, UNSPECIFIED TYPE: ICD-10-CM

## 2022-10-24 DIAGNOSIS — K21.9 GASTROESOPHAGEAL REFLUX DISEASE, UNSPECIFIED WHETHER ESOPHAGITIS PRESENT: ICD-10-CM

## 2022-10-24 LAB
ALBUMIN SERPL BCP-MCNC: 3.9 G/DL (ref 3.5–5)
ALP SERPL-CCNC: 38 U/L (ref 46–116)
ALT SERPL W P-5'-P-CCNC: 28 U/L (ref 12–78)
ANION GAP SERPL CALCULATED.3IONS-SCNC: 2 MMOL/L (ref 4–13)
AST SERPL W P-5'-P-CCNC: 11 U/L (ref 5–45)
BASOPHILS # BLD AUTO: 0.04 THOUSANDS/ÂΜL (ref 0–0.1)
BASOPHILS NFR BLD AUTO: 1 % (ref 0–1)
BILIRUB SERPL-MCNC: 0.27 MG/DL (ref 0.2–1)
BUN SERPL-MCNC: 13 MG/DL (ref 5–25)
CALCIUM SERPL-MCNC: 8.9 MG/DL (ref 8.3–10.1)
CHLORIDE SERPL-SCNC: 112 MMOL/L (ref 96–108)
CHOLEST SERPL-MCNC: 145 MG/DL
CO2 SERPL-SCNC: 24 MMOL/L (ref 21–32)
CREAT SERPL-MCNC: 0.71 MG/DL (ref 0.6–1.3)
CRP SERPL QL: <3 MG/L
EOSINOPHIL # BLD AUTO: 0.27 THOUSAND/ÂΜL (ref 0–0.61)
EOSINOPHIL NFR BLD AUTO: 6 % (ref 0–6)
ERYTHROCYTE [DISTWIDTH] IN BLOOD BY AUTOMATED COUNT: 12.6 % (ref 11.6–15.1)
GFR SERPL CREATININE-BSD FRML MDRD: 114 ML/MIN/1.73SQ M
GLUCOSE P FAST SERPL-MCNC: 93 MG/DL (ref 65–99)
HCT VFR BLD AUTO: 42.4 % (ref 34.8–46.1)
HDLC SERPL-MCNC: 40 MG/DL
HGB BLD-MCNC: 13.7 G/DL (ref 11.5–15.4)
IMM GRANULOCYTES # BLD AUTO: 0.02 THOUSAND/UL (ref 0–0.2)
IMM GRANULOCYTES NFR BLD AUTO: 0 % (ref 0–2)
LDLC SERPL CALC-MCNC: 95 MG/DL (ref 0–100)
LYMPHOCYTES # BLD AUTO: 1.94 THOUSANDS/ÂΜL (ref 0.6–4.47)
LYMPHOCYTES NFR BLD AUTO: 40 % (ref 14–44)
MCH RBC QN AUTO: 29.8 PG (ref 26.8–34.3)
MCHC RBC AUTO-ENTMCNC: 32.3 G/DL (ref 31.4–37.4)
MCV RBC AUTO: 92 FL (ref 82–98)
MONOCYTES # BLD AUTO: 0.43 THOUSAND/ÂΜL (ref 0.17–1.22)
MONOCYTES NFR BLD AUTO: 9 % (ref 4–12)
NEUTROPHILS # BLD AUTO: 2.15 THOUSANDS/ÂΜL (ref 1.85–7.62)
NEUTS SEG NFR BLD AUTO: 44 % (ref 43–75)
NRBC BLD AUTO-RTO: 0 /100 WBCS
PLATELET # BLD AUTO: 178 THOUSANDS/UL (ref 149–390)
PMV BLD AUTO: 12.4 FL (ref 8.9–12.7)
POTASSIUM SERPL-SCNC: 4.4 MMOL/L (ref 3.5–5.3)
PROT SERPL-MCNC: 6.9 G/DL (ref 6.4–8.4)
RBC # BLD AUTO: 4.59 MILLION/UL (ref 3.81–5.12)
SODIUM SERPL-SCNC: 138 MMOL/L (ref 135–147)
TRIGL SERPL-MCNC: 49 MG/DL
TSH SERPL DL<=0.05 MIU/L-ACNC: 1.47 UIU/ML (ref 0.45–4.5)
WBC # BLD AUTO: 4.85 THOUSAND/UL (ref 4.31–10.16)

## 2022-10-24 PROCEDURE — 84443 ASSAY THYROID STIM HORMONE: CPT

## 2022-10-24 PROCEDURE — 80061 LIPID PANEL: CPT

## 2022-10-24 PROCEDURE — 36415 COLL VENOUS BLD VENIPUNCTURE: CPT

## 2022-10-24 PROCEDURE — 86364 TISS TRNSGLTMNASE EA IG CLAS: CPT

## 2022-10-24 PROCEDURE — 86258 DGP ANTIBODY EACH IG CLASS: CPT

## 2022-10-24 PROCEDURE — 86140 C-REACTIVE PROTEIN: CPT

## 2022-10-24 PROCEDURE — 82784 ASSAY IGA/IGD/IGG/IGM EACH: CPT

## 2022-10-24 PROCEDURE — 80053 COMPREHEN METABOLIC PANEL: CPT

## 2022-10-24 PROCEDURE — 85025 COMPLETE CBC W/AUTO DIFF WBC: CPT

## 2022-10-24 PROCEDURE — 86231 EMA EACH IG CLASS: CPT

## 2022-10-24 NOTE — PROGRESS NOTES
Assessment/Plan:    70-year-old woman with: lumbar radicular symptoms anxiety headaches and history of leukopenia  Discussed workup and treatment options with risks and benefits discussed healthy diet exercise ample sleep stress reduction strategies and ample hydration discussed supportive care return parameters otherwise will begin trial of Cymbalta will check labs will give steroid burst discussed supportive care return parameters advised her to follow-up with Christy in one month    No problem-specific Assessment & Plan notes found for this encounter  Diagnoses and all orders for this visit:    Lumbar radiculitis  -     predniSONE 20 mg tablet; Take 2 tablets (40 mg total) by mouth daily for 5 days  -     Ambulatory Referral to Pain Management; Future  -     DULoxetine (CYMBALTA) 20 mg capsule; Take 1 capsule (20 mg total) by mouth daily  -     CBC and differential; Future  -     Comprehensive metabolic panel; Future  -     TSH, 3rd generation with Free T4 reflex; Future  -     Lipid Panel with Direct LDL reflex; Future  -     C-reactive protein; Future    Migraine without aura, with intractable migraine, so stated, with status migrainosus  -     predniSONE 20 mg tablet; Take 2 tablets (40 mg total) by mouth daily for 5 days  -     DULoxetine (CYMBALTA) 20 mg capsule; Take 1 capsule (20 mg total) by mouth daily  -     CBC and differential; Future  -     Comprehensive metabolic panel; Future  -     TSH, 3rd generation with Free T4 reflex; Future  -     Lipid Panel with Direct LDL reflex; Future  -     C-reactive protein; Future    Anxiety state  -     hydrOXYzine HCL (ATARAX) 25 mg tablet; TAKE 1 TABLET BY MOUTH EVERY 8 HOURS AS NEEDED FOR ANXIETY  -     predniSONE 20 mg tablet; Take 2 tablets (40 mg total) by mouth daily for 5 days  -     DULoxetine (CYMBALTA) 20 mg capsule; Take 1 capsule (20 mg total) by mouth daily  -     CBC and differential; Future  -     Comprehensive metabolic panel;  Future  - TSH, 3rd generation with Free T4 reflex; Future  -     Lipid Panel with Direct LDL reflex; Future  -     C-reactive protein; Future    Leukopenia, unspecified type  -     DULoxetine (CYMBALTA) 20 mg capsule; Take 1 capsule (20 mg total) by mouth daily  -     CBC and differential; Future  -     Comprehensive metabolic panel; Future  -     TSH, 3rd generation with Free T4 reflex; Future  -     Lipid Panel with Direct LDL reflex; Future  -     C-reactive protein; Future          Subjective:     No chief complaint on file  Patient ID: Sheree Daniel is a 28 y o  female  Patient is a 81-year-old woman who presents for follow-up on lumbar radicular symptoms anxiety headaches and history of leukopenia she admits she is having some breakthrough symptoms and she would like some additional help no fevers chills nausea vomiting tolerating p o  intake no complaints at this      The following portions of the patient's history were reviewed and updated as appropriate: allergies, current medications, past family history, past medical history, past social history, past surgical history and problem list     Review of Systems   Constitutional: Negative  HENT: Negative  Eyes: Negative  Respiratory: Negative  Cardiovascular: Negative  Gastrointestinal: Negative  Endocrine: Negative  Genitourinary: Negative  Musculoskeletal: Positive for back pain  Allergic/Immunologic: Negative  Neurological: Positive for headaches  Hematological: Negative  Psychiatric/Behavioral: Negative  All other systems reviewed and are negative  Objective:      /82 (BP Location: Left arm, Patient Position: Sitting, Cuff Size: Large)   Pulse 77   Temp 98 7 °F (37 1 °C) (Tympanic)   Ht 5' 7" (1 702 m)   Wt 84 4 kg (186 lb)   SpO2 97%   BMI 29 13 kg/m²          Physical Exam  Constitutional:       Appearance: She is well-developed  HENT:      Head: Atraumatic        Right Ear: External ear normal  Left Ear: External ear normal    Eyes:      Conjunctiva/sclera: Conjunctivae normal       Pupils: Pupils are equal, round, and reactive to light  Cardiovascular:      Rate and Rhythm: Normal rate and regular rhythm  Heart sounds: Normal heart sounds  Pulmonary:      Effort: Pulmonary effort is normal  No respiratory distress  Breath sounds: Normal breath sounds  Abdominal:      General: There is no distension  Palpations: Abdomen is soft  Tenderness: There is no abdominal tenderness  There is no guarding or rebound  Musculoskeletal:         General: Normal range of motion  Cervical back: Normal range of motion  Skin:     General: Skin is warm and dry  Neurological:      Mental Status: She is alert and oriented to person, place, and time  Cranial Nerves: No cranial nerve deficit  Psychiatric:         Behavior: Behavior normal          Thought Content: Thought content normal          Judgment: Judgment normal          BMI Counseling: Body mass index is 29 13 kg/m²  The BMI is above normal  Nutrition recommendations include encouraging healthy choices of fruits and vegetables  Exercise recommendations include moderate physical activity 150 minutes/week  Rationale for BMI follow-up plan is due to patient being overweight or obese  Depression Screening and Follow-up Plan: Patient was screened for depression during today's encounter  They screened negative with a PHQ-2 score of 0

## 2022-10-25 LAB
ENDOMYSIUM IGA SER QL: NEGATIVE
GLIADIN PEPTIDE IGA SER-ACNC: 4 UNITS (ref 0–19)
GLIADIN PEPTIDE IGG SER-ACNC: 1 UNITS (ref 0–19)
IGA SERPL-MCNC: 107 MG/DL (ref 87–352)
TTG IGA SER-ACNC: <2 U/ML (ref 0–3)
TTG IGG SER-ACNC: <2 U/ML (ref 0–5)

## 2022-10-27 ENCOUNTER — TELEPHONE (OUTPATIENT)
Dept: GASTROENTEROLOGY | Facility: AMBULARY SURGERY CENTER | Age: 32
End: 2022-10-27

## 2022-10-27 NOTE — TELEPHONE ENCOUNTER
Cancelled today apt - please can you reschedule EGD as per message    Thank you              ----- Message from Guy Abreu LPN sent at [phone]  8:02 AM EDT -----  Patient aware and educated  Please call patient to c/x today's appt and reschedule her EGD  She had to c/x her EGD because she was sick and today's appt was the f/u to that procedure   Thank you

## 2022-10-27 NOTE — TELEPHONE ENCOUNTER
Spoke w/ pt   Patient is scheduled for EGD on January 4 , 2023 at [facility]  with Cee Bejarano MD  Patient is aware of pre-procedure prep of Nothing to eat after midnight, day of the procedure clear liquids only and nothing to drink 4 hours prior to the EGD and they will be called the day prior between 2 and 6 pm for time to report for procedure  Pre-procedure prep has been given to the patient  via [address] mail on October 27, 2022

## 2022-11-10 DIAGNOSIS — G43.011 MIGRAINE WITHOUT AURA, WITH INTRACTABLE MIGRAINE, SO STATED, WITH STATUS MIGRAINOSUS: ICD-10-CM

## 2022-11-10 DIAGNOSIS — F41.1 ANXIETY STATE: ICD-10-CM

## 2022-11-10 DIAGNOSIS — D72.819 LEUKOPENIA, UNSPECIFIED TYPE: ICD-10-CM

## 2022-11-10 DIAGNOSIS — M54.16 LUMBAR RADICULITIS: ICD-10-CM

## 2022-11-10 RX ORDER — DULOXETIN HYDROCHLORIDE 20 MG/1
CAPSULE, DELAYED RELEASE ORAL
Qty: 30 CAPSULE | Refills: 0 | Status: SHIPPED | OUTPATIENT
Start: 2022-11-10

## 2022-11-25 ENCOUNTER — OFFICE VISIT (OUTPATIENT)
Dept: FAMILY MEDICINE CLINIC | Facility: CLINIC | Age: 32
End: 2022-11-25

## 2022-11-25 ENCOUNTER — APPOINTMENT (OUTPATIENT)
Dept: RADIOLOGY | Facility: MEDICAL CENTER | Age: 32
End: 2022-11-25
Attending: PHYSICIAN ASSISTANT

## 2022-11-25 VITALS
HEIGHT: 67 IN | RESPIRATION RATE: 16 BRPM | OXYGEN SATURATION: 98 % | BODY MASS INDEX: 29.82 KG/M2 | HEART RATE: 73 BPM | SYSTOLIC BLOOD PRESSURE: 120 MMHG | TEMPERATURE: 97.6 F | WEIGHT: 190 LBS | DIASTOLIC BLOOD PRESSURE: 76 MMHG

## 2022-11-25 DIAGNOSIS — F41.1 ANXIETY STATE: ICD-10-CM

## 2022-11-25 DIAGNOSIS — R06.02 SHORTNESS OF BREATH: ICD-10-CM

## 2022-11-25 DIAGNOSIS — F17.210 CIGARETTE NICOTINE DEPENDENCE WITHOUT COMPLICATION: Primary | ICD-10-CM

## 2022-11-25 DIAGNOSIS — K21.9 GASTROESOPHAGEAL REFLUX DISEASE, UNSPECIFIED WHETHER ESOPHAGITIS PRESENT: ICD-10-CM

## 2022-11-25 PROBLEM — Z72.0 TOBACCO ABUSE: Status: RESOLVED | Noted: 2021-07-09 | Resolved: 2022-11-25

## 2022-11-25 RX ORDER — ALBUTEROL SULFATE 90 UG/1
2 AEROSOL, METERED RESPIRATORY (INHALATION) EVERY 6 HOURS PRN
Qty: 18 G | Refills: 0 | Status: SHIPPED | OUTPATIENT
Start: 2022-11-25

## 2022-11-25 NOTE — PROGRESS NOTES
Name: Patricia Kern      : 1990      MRN: [id number]  Encounter Provider: Jason Royal PA-C  Encounter Date: 2022   Encounter department: [address] [place] [address]     1  Cigarette nicotine dependence without complication  -     Ambulatory referral to Pulmonology; Future    2  Shortness of breath  -     XR chest pa & lateral; Future; Expected date: 2022  -     Ambulatory referral to Pulmonology; Future  -     albuterol (Ventolin HFA) 90 mcg/act inhaler; Inhale 2 puffs every 6 (six) hours as needed for wheezing    3  Gastroesophageal reflux disease, unspecified whether esophagitis present    4  Anxiety state    -chest x-ray ordered  -I did recommend she start the Ventolin inhaler as needed  I did recommend follow-up in 2 weeks but she states that she will be away at that time  She may not see me for 3-4 weeks  -refer to Pulmonary  -encouraged to quit smoking  -patient prefers to hold on flu vaccine at this time  -denies any symptoms of anxiety which could be causing her symptoms of chest tightness  -she states that she does notice the difference with her reflux symptoms and she gets that intermittent burning in her chest   She continues on the pantoprazole 40 mg daily  She also takes Tums as needed  She is scheduled for her endoscopy in 2023 which she had to reschedule from recently because she was not feeling well   -advised to follow-up with any increasing symptoms since she cannot follow-up in 2 weeks as recommended    M*Modal software was used to dictate this note  It may contain errors with dictating incorrect words/spelling  Please contact provider directly for any questions  Subjective      Patient presents today for intermittent shortness of breath, chest tightness, wheezing  She states that she has noticed this more so since her 2 COVID infections    She did not develop COVID pneumonia in the past   She continues to smoke cigarettes and does not plan on quitting until she gets her braces take it off because she wants to get a tooth white or at that time which will encourage her to quit smoking  She did have an inhaler during her COVID infection in January and she would like an inhaler again  She notices tightness on the lateral rib region along with some tightness and wheezing in her chest intermittently and she states it is almost daily now  She feels as though her reflux symptoms are different  She does notice of burning in her chest intermittently and she takes Tums with relief  She denies any symptoms of anxiety at this time  Review of Systems   Constitutional: Negative  Respiratory:        As stated in HPI   Gastrointestinal: Positive for abdominal pain  Negative for nausea and vomiting  Current Outpatient Medications on File Prior to Visit   Medication Sig   • DULoxetine (CYMBALTA) 20 mg capsule TAKE 1 CAPSULE BY MOUTH EVERY DAY   • hydrOXYzine HCL (ATARAX) 25 mg tablet TAKE 1 TABLET BY MOUTH EVERY 8 HOURS AS NEEDED FOR ANXIETY   • busPIRone (BUSPAR) 5 mg tablet Take 1 tablet (5 mg total) by mouth 2 (two) times a day (Patient not taking: Reported on 7/5/2022)   • COVID-19 Specimen Collection KIT TEST AS DIRECTED TODAY (Patient not taking: No sig reported)   • pantoprazole (PROTONIX) 40 mg tablet Take 1 tablet (40 mg total) by mouth daily before breakfast (Patient not taking: Reported on 7/5/2022)   • Retin-A 0 025 % cream Apply topically daily at bedtime Spread one pea-sized amount of medication over entire face about one hour before bedtime  (Patient not taking: Reported on 3/8/2022)       Objective     /76 (BP Location: Left arm, Patient Position: Sitting, Cuff Size: Large)   Pulse 73   Temp 97 6 °F (36 4 °C) (Tympanic)   Resp 16   Ht 5' 7" (1 702 m)   Wt 86 2 kg (190 lb)   SpO2 98%   BMI 29 76 kg/m²     Physical Exam  Vitals reviewed     Constitutional:       General: She is not in acute distress  Appearance: Normal appearance  She is not ill-appearing or toxic-appearing  HENT:      Head: Normocephalic and atraumatic  Cardiovascular:      Rate and Rhythm: Normal rate and regular rhythm  Heart sounds: No murmur heard  Pulmonary:      Effort: Pulmonary effort is normal  No respiratory distress  Breath sounds: Normal breath sounds  No wheezing, rhonchi or rales  Abdominal:      General: Abdomen is flat  Bowel sounds are normal       Tenderness: There is abdominal tenderness (Mild tenderness in the epigastric region)  Musculoskeletal:      Cervical back: Neck supple  Neurological:      General: No focal deficit present  Mental Status: She is alert  Psychiatric:         Mood and Affect: Mood normal          Behavior: Behavior normal          Thought Content:  Thought content normal          Judgment: Judgment normal        Christy Lu PA-C

## 2022-12-12 ENCOUNTER — OFFICE VISIT (OUTPATIENT)
Dept: URGENT CARE | Age: 32
End: 2022-12-12

## 2022-12-12 VITALS — RESPIRATION RATE: 18 BRPM | HEART RATE: 84 BPM | OXYGEN SATURATION: 96 % | TEMPERATURE: 97 F

## 2022-12-12 DIAGNOSIS — Z20.828 EXPOSURE TO THE FLU: Primary | ICD-10-CM

## 2022-12-12 DIAGNOSIS — R11.2 NAUSEA AND VOMITING, UNSPECIFIED VOMITING TYPE: ICD-10-CM

## 2022-12-12 DIAGNOSIS — R50.9 FEVER, UNSPECIFIED FEVER CAUSE: ICD-10-CM

## 2022-12-12 RX ORDER — ONDANSETRON 4 MG/1
4 TABLET, ORALLY DISINTEGRATING ORAL EVERY 6 HOURS PRN
Qty: 5 TABLET | Refills: 0 | Status: SHIPPED | OUTPATIENT
Start: 2022-12-12

## 2022-12-12 RX ORDER — OSELTAMIVIR PHOSPHATE 75 MG/1
75 CAPSULE ORAL EVERY 12 HOURS SCHEDULED
Qty: 10 CAPSULE | Refills: 0 | Status: SHIPPED | OUTPATIENT
Start: 2022-12-12 | End: 2022-12-17

## 2022-12-12 NOTE — PROGRESS NOTES
[facility]        NAME: Mansoor Knox is a 33 y o  female  : 1990    MRN: [id number]  DATE: 2022  TIME: 12:22 PM    Assessment and Plan   Exposure to the flu [Z20 828]  1  Exposure to the flu  oseltamivir (TAMIFLU) 75 mg capsule      2  Nausea and vomiting, unspecified vomiting type  ondansetron (ZOFRAN-ODT) 4 mg disintegrating tablet      3  Fever, unspecified fever cause              Patient Instructions     Tylenol or Motrin OTC p r n  for aches and pains  Rest And increase nutrition  Adequate hydration  Pepto-Bismol OTC p r n  for upset stomach  Follow up with PCP in 3-5 days  Proceed to  ER if symptoms worsen  Chief Complaint     Chief Complaint   Patient presents with   • Vomiting   • Generalized Body Aches   • Fever   • Headache   • Cough     Patient had exposure to the flu per patient her feer yesterday was 103- vomited x2 today and having diarrhea         History of Present Illness       HPI   Presents the clinic with complaint of vomiting, body aches, fever, headache, cough, and just feeling bad  Temperature at home was high as 103 degrees  Today was 102 degrees  Son was diagnosed with the influenza  Review of Systems   Review of Systems   Constitutional: Positive for appetite change, chills and fever  HENT: Negative for sore throat  Respiratory: Negative for cough and shortness of breath  Cardiovascular: Negative for chest pain  Gastrointestinal: Positive for diarrhea, nausea and vomiting  Musculoskeletal: Positive for myalgias           Current Medications       Current Outpatient Medications:   •  ondansetron (ZOFRAN-ODT) 4 mg disintegrating tablet, Take 1 tablet (4 mg total) by mouth every 6 (six) hours as needed for nausea or vomiting, Disp: 5 tablet, Rfl: 0  •  oseltamivir (TAMIFLU) 75 mg capsule, Take 1 capsule (75 mg total) by mouth every 12 (twelve) hours for 5 days, Disp: 10 capsule, Rfl: 0  •  albuterol (Ventolin HFA) 90 mcg/act inhaler, Inhale 2 puffs every 6 (six) hours as needed for wheezing, Disp: 18 g, Rfl: 0  •  busPIRone (BUSPAR) 5 mg tablet, Take 1 tablet (5 mg total) by mouth 2 (two) times a day (Patient not taking: Reported on 7/5/2022), Disp: 180 tablet, Rfl: 0  •  COVID-19 Specimen Collection KIT, TEST AS DIRECTED TODAY (Patient not taking: No sig reported), Disp: , Rfl:   •  DULoxetine (CYMBALTA) 20 mg capsule, TAKE 1 CAPSULE BY MOUTH EVERY DAY, Disp: 30 capsule, Rfl: 0  •  hydrOXYzine HCL (ATARAX) 25 mg tablet, TAKE 1 TABLET BY MOUTH EVERY 8 HOURS AS NEEDED FOR ANXIETY, Disp: 30 tablet, Rfl: 1  •  pantoprazole (PROTONIX) 40 mg tablet, Take 1 tablet (40 mg total) by mouth daily before breakfast (Patient not taking: Reported on 7/5/2022), Disp: 90 tablet, Rfl: 2  •  Retin-A 0 025 % cream, Apply topically daily at bedtime Spread one pea-sized amount of medication over entire face about one hour before bedtime  (Patient not taking: Reported on 3/8/2022), Disp: 45 g, Rfl: 6    Current Allergies     Allergies as of 12/12/2022 - Reviewed 12/12/2022   Allergen Reaction Noted   • No known allergies  10/17/2017            The following portions of the patient's history were reviewed and updated as appropriate: allergies, current medications, past family history, past medical history, past social history, past surgical history and problem list      Past Medical History:   Diagnosis Date   • Acne    • Anxiety    • Depression    • Hypertension        No past surgical history on file  Family History   Problem Relation Age of Onset   • Cancer Father         non hodgkins lymphoma   • Cancer Paternal Grandmother    • Cancer Paternal Grandfather          Medications have been verified  Objective   Pulse 84   Temp (!) 97 °F (36 1 °C)   Resp 18   SpO2 96%   No LMP recorded         Physical Exam     Physical Exam

## 2022-12-12 NOTE — LETTER
December 12, 2022     Patient: Olvin Martinez   YOB: 1990   Date of Visit: 12/12/2022       To Whom it May Concern:    Kira Swift was seen in my clinic on 12/12/2022  She may return to school/work on 12/16/2022 if fever free for 24 hrs without fever medication  If you have any questions or concerns, please don't hesitate to call           Sincerely,          [facility]        CC: No Recipients

## 2022-12-19 DIAGNOSIS — F41.1 ANXIETY STATE: ICD-10-CM

## 2022-12-19 RX ORDER — HYDROXYZINE HYDROCHLORIDE 25 MG/1
TABLET, FILM COATED ORAL
Qty: 30 TABLET | Refills: 1 | Status: SHIPPED | OUTPATIENT
Start: 2022-12-19

## 2023-02-03 DIAGNOSIS — F41.1 ANXIETY STATE: ICD-10-CM

## 2023-02-03 RX ORDER — HYDROXYZINE HYDROCHLORIDE 25 MG/1
TABLET, FILM COATED ORAL
Qty: 30 TABLET | Refills: 0 | Status: SHIPPED | OUTPATIENT
Start: 2023-02-03

## 2023-02-09 ENCOUNTER — TELEPHONE (OUTPATIENT)
Dept: OBGYN CLINIC | Facility: OTHER | Age: 33
End: 2023-02-09

## 2023-02-09 ENCOUNTER — CONSULT (OUTPATIENT)
Dept: PAIN MEDICINE | Facility: CLINIC | Age: 33
End: 2023-02-09

## 2023-02-09 VITALS
DIASTOLIC BLOOD PRESSURE: 79 MMHG | BODY MASS INDEX: 29.82 KG/M2 | WEIGHT: 190 LBS | HEIGHT: 67 IN | SYSTOLIC BLOOD PRESSURE: 114 MMHG

## 2023-02-09 DIAGNOSIS — G56.01 CARPAL TUNNEL SYNDROME OF RIGHT WRIST: Primary | ICD-10-CM

## 2023-02-09 DIAGNOSIS — M54.2 NECK PAIN: ICD-10-CM

## 2023-02-09 DIAGNOSIS — M54.40 LOW BACK PAIN WITH SCIATICA, SCIATICA LATERALITY UNSPECIFIED, UNSPECIFIED BACK PAIN LATERALITY, UNSPECIFIED CHRONICITY: Primary | ICD-10-CM

## 2023-02-09 DIAGNOSIS — G56.01 CARPAL TUNNEL SYNDROME OF RIGHT WRIST: ICD-10-CM

## 2023-02-09 RX ORDER — MELOXICAM 15 MG/1
15 TABLET ORAL DAILY PRN
Qty: 30 TABLET | Refills: 1 | Status: SHIPPED | OUTPATIENT
Start: 2023-02-09

## 2023-02-09 NOTE — PROGRESS NOTES
Assessment  1  Low back pain with sciatica, sciatica laterality unspecified, unspecified back pain laterality, unspecified chronicity    2  Neck pain    3  Carpal tunnel syndrome of right wrist        Plan  28-year-old female referred by PCP, presenting for initial consultation regarding neck pain that radiates into the left shoulder and occasionally into the left upper extremity with associated numbness and tingling  She also complains of weakness in the right hand  She has been dealing with the symptoms for roughly a year and denies any trauma or inciting event  The patient also complains of lumbosacral back pain that radiates into the posterior aspect of the left lower extremity to the ankle  She does occasionally experience some weakness in the right leg  She denies any trauma or inciting event  MRI of the lumbar spine does show a disc bulge at L4-5 without any significant central or foraminal stenosis  EMG of the right upper extremity reveals carpal tunnel syndrome on the right  Pain and neuropathic symptoms into the right hand improved with physical therapy and night splint  However weakness continues to precipitate  The patient has done physical therapy for her low back and lower extremity symptoms without any relief  She has not done any formal physical therapy or chiropractic treatment for her cervical complaints  She does take Tylenol as needed with mild relief  She was prescribed duloxetine by her PCP, however did not take this secondary to fear of side effects  Neck and low back pain do have myofascial components  Left upper extremity symptoms may be radicular in nature versus peripheral neuropathy  Precipitating hand weakness likely secondary to carpal tunnel syndrome  The patient does have an SI mediated component on the left contributing to her low back pain    Lower extremity symptoms unlikely to be radicular in nature considering lack of compressive pathology in the lumbar spine although I cannot completely rule this out  1   I will order an x-ray of the cervical spine  2  I will prescribe physical therapy for the patient cervical complaints and she will continue with her home exercise program for her lumbar complaints  3  I will order an EMG of the left lower extremity  4  I will prescribe a trial of meloxicam 15 mg daily as needed and the patient should avoid any other NSAIDs while on this medication  5  Patient may continue with Tylenol as needed and should not exceed more than 3000 mg in 24 hours  6  I will follow-up with the patient in 6 weeks and if she does not respond to conservative treatment we will consider an MRI of the cervical spine without contrast      My impressions and treatment recommendations were discussed in detail with the patient who verbalized understanding and had no further questions  Discharge instructions were provided  I personally saw and examined the patient and I agree with the above discussed plan of care  No orders of the defined types were placed in this encounter  No orders of the defined types were placed in this encounter  History of Present Illness    Karolyn Pulido is a 33 y o  female referred by PCP, presenting for initial consultation regarding neck pain that radiates into the left shoulder and occasionally into the left upper extremity with associated numbness and tingling  She also complains of weakness in the right hand  She has been dealing with the symptoms for roughly a year and denies any trauma or inciting event  The patient also complains of lumbosacral back pain that radiates into the posterior aspect of the left lower extremity to the ankle  She does occasionally experience some weakness in the right leg  She denies any trauma or inciting event  She denies any bladder or bowel incontinence or saddle anesthesia  She denies any balance issues    MRI of the lumbar spine does show a disc bulge at L4-5 without any significant central or foraminal stenosis  EMG of the right upper extremity reveals carpal tunnel syndrome on the right  Pain and neuropathic symptoms into the right hand improved with physical therapy and night splint  However weakness continues to precipitate  The patient has done physical therapy for her low back and lower extremity symptoms without any relief  She has not done any formal physical therapy or chiropractic treatment for her cervical complaints  She does take Tylenol as needed with mild relief  She was prescribed duloxetine by her PCP, however did not take this secondary to fear of side effects  The patient rates her pain a 7-10 out of 10 depending upon activity and the pain is constant  The pain does not follow any particular pattern throughout the day  The pain is described as shooting, numbness, sharp, pins-and-needles, pressure-like, throbbing, dull, and aching  The pain is increased with lying down, bending, sitting, exercise, and relaxation  The pain is alleviated with lying down, walking, exercise, and relaxation  She has found some relief with heat and ice application  Other than as stated above, the patient denies any interval changes in medications, medical condition, mental condition, symptoms, or allergies since the last office visit  I have personally reviewed and/or updated the patient's past medical history, past surgical history, family history, social history, current medications, allergies, and vital signs today  Review of Systems   Constitutional: Positive for unexpected weight change  Negative for fever  HENT: Negative for trouble swallowing  Eyes: Negative for visual disturbance  Respiratory: Positive for shortness of breath  Negative for wheezing  Cardiovascular: Negative for chest pain and palpitations  Gastrointestinal: Positive for abdominal pain and nausea  Negative for constipation, diarrhea and vomiting     Endocrine: Negative for cold intolerance, heat intolerance and polydipsia  Genitourinary: Negative for difficulty urinating and frequency  Musculoskeletal: Negative for arthralgias, gait problem, joint swelling and myalgias  Skin: Negative for rash  Neurological: Positive for numbness and headaches  Negative for dizziness, seizures, syncope and weakness  Hematological: Does not bruise/bleed easily  Psychiatric/Behavioral: Negative for dysphoric mood  Anxiety   All other systems reviewed and are negative  Patient Active Problem List   Diagnosis   • Anxiety state   • Well adult exam   • Migraine without aura, with intractable migraine, so stated, with status migrainosus   • Right hand pain   • Encounter for screening for HIV   • Lipid screening   • Diabetes mellitus screening   • Carpal tunnel syndrome of right wrist   • Laryngopharyngeal reflux (LPR)   • Gastroesophageal reflux disease   • Right leg paresthesias   • Paresthesias   • Chronic headaches   • Numbness and tingling in right hand   • Arthralgia of right temporomandibular joint   • Neck pain   • Skin lesion   • COVID-19 virus infection   • Musculoskeletal chest pain   • Leukopenia   • Hypocalcemia   • Lumbar radiculitis   • Cigarette nicotine dependence without complication   • Shortness of breath       Past Medical History:   Diagnosis Date   • Acne    • Anxiety    • Depression    • Hypertension        No past surgical history on file      Family History   Problem Relation Age of Onset   • Cancer Father         non hodgkins lymphoma   • Cancer Paternal Grandmother    • Cancer Paternal Grandfather        Social History     Occupational History   • Occupation: NOT EMPLOYED   Tobacco Use   • Smoking status: Heavy Smoker     Packs/day: 0 50     Years: 1 20     Pack years: 0 60     Types: Cigarettes   • Smokeless tobacco: Never   • Tobacco comments:     8 cigarettes per day, No passive smoke exposure   Vaping Use   • Vaping Use: Never used   Substance and Sexual Activity   • Alcohol use: Not Currently   • Drug use: Never   • Sexual activity: Yes     Partners: Male       Current Outpatient Medications on File Prior to Visit   Medication Sig   • albuterol (Ventolin HFA) 90 mcg/act inhaler Inhale 2 puffs every 6 (six) hours as needed for wheezing   • busPIRone (BUSPAR) 5 mg tablet Take 1 tablet (5 mg total) by mouth 2 (two) times a day (Patient not taking: Reported on 7/5/2022)   • COVID-19 Specimen Collection KIT TEST AS DIRECTED TODAY (Patient not taking: No sig reported)   • DULoxetine (CYMBALTA) 20 mg capsule TAKE 1 CAPSULE BY MOUTH EVERY DAY   • hydrOXYzine HCL (ATARAX) 25 mg tablet TAKE 1 TABLET BY MOUTH EVERY 8 HOURS AS NEEDED FOR ANXIETY   • ondansetron (ZOFRAN-ODT) 4 mg disintegrating tablet Take 1 tablet (4 mg total) by mouth every 6 (six) hours as needed for nausea or vomiting   • pantoprazole (PROTONIX) 40 mg tablet Take 1 tablet (40 mg total) by mouth daily before breakfast (Patient not taking: Reported on 7/5/2022)   • Retin-A 0 025 % cream Apply topically daily at bedtime Spread one pea-sized amount of medication over entire face about one hour before bedtime  (Patient not taking: Reported on 3/8/2022)     No current facility-administered medications on file prior to visit  Allergies   Allergen Reactions   • No Known Allergies        Physical Exam    /79   Ht 5' 7" (1 702 m)   Wt 86 2 kg (190 lb)   BMI 29 76 kg/m²     Constitutional: normal, well developed, well nourished, alert, in no distress and non-toxic and no overt pain behavior  Eyes: anicteric  HEENT: grossly intact  Neck: supple, symmetric, trachea midline and no masses   Pulmonary:even and unlabored  Cardiovascular:No edema or pitting edema present  Skin:Normal without rashes or lesions and well hydrated  Psychiatric:Mood and affect appropriate  Neurologic:Cranial Nerves II-XII grossly intact  Musculoskeletal:normal gait    Bilateral cervical paraspinals tender to palpation and ropey in texture  Full range of motion of cervical spine in all planes  Bilateral biceps, triceps, brachioradialis, patellar, and Achilles reflexes were 2-4 and symmetrical   No clonus is noted bilaterally  Negative Joslyn's reflex bilaterally  Bilateral upper extremity strength 5 out of 5 in all muscular's  Sensation diminished to light touch in the right upper extremity no specific dermatomal fashion  Negative Spurling's bilaterally  Negative Tinel's over bilateral carpal and cubital tunnels  Left lumbar paraspinals tender to palpation from L3-L5  Tenderness to palpation over left SI joint  Bilateral lower extremity strength 5 out of 5 in all muscular's  Sensation tact light touch in L3-S1 dermatomes bilaterally  Negative straight leg raise bilaterally  Positive Haseeb's and Gaenslen's test on the left  Positive AP compression test     Imaging    Study Result    Narrative & Impression   MRI LUMBAR SPINE WITHOUT CONTRAST     INDICATION: R20 2: Paresthesia of skin  Right-sided weakness  History of fall      COMPARISON:  None      TECHNIQUE:  Sagittal T1, sagittal T2, sagittal inversion recovery, axial T1 and axial T2, coronal T2     IMAGE QUALITY:  Diagnostic     FINDINGS:     VERTEBRAL BODIES:  There is a transitional lumbosacral junction  The L5 vertebral body has an elongated left transverse process articulating with the sacral base  Normal alignment of the lumbar spine  No spondylolysis or spondylolisthesis  No   scoliosis  No compression fracture  Normal marrow signal is identified within the visualized bony structures  No discrete marrow lesion      SACRUM:  Normal signal within the sacrum   No evidence of insufficiency or stress fracture      DISTAL CORD AND CONUS:  Normal size and signal within the distal cord and conus      PARASPINAL SOFT TISSUES:  Paraspinal soft tissues are unremarkable      LOWER THORACIC DISC SPACES:  Normal disc height and signal   No disc herniation, canal stenosis or foraminal narrowing      LUMBAR DISC SPACES:     L1-L2:  Normal      L2-L3:  Normal      L3-L4:  Normal      L4-L5:  Minimal annular bulge without saline central or foraminal narrowing      L5-S1:  Normal      IMPRESSION:     No central or foraminal narrowing  Minimal annular bulge at L4-5         Workstation performed: [id number]     Study Result    Narrative & Impression   SACRUM AND COCCYX     INDICATION:   S39  92XA: Unspecified injury of lower back, initial encounter    PT FELL OFF A CHAIR LAST NIGHT, PAIN SACRUM/COCCYX AREA       COMPARISON:  None     VIEWS:  XR SACRUM AND COCCYX         FINDINGS:     Cortical discontinuity of the 5th sacral segment indicative of a nondisplaced, mildly angulated fracture      Sacral arcuate lines are maintained      The SI joints appear symmetric      Pubic symphysis is maintained      Visualized lower lumbar spine is unremarkable      IMPRESSION:     Mildly angulated sacral fracture involving S5         Workstation performed: [id number]

## 2023-02-09 NOTE — TELEPHONE ENCOUNTER
Patient is being referred to a orthopedics  Please schedule accordingly      [address]ice [address]   [phone]

## 2023-02-15 ENCOUNTER — EVALUATION (OUTPATIENT)
Dept: PHYSICAL THERAPY | Facility: REHABILITATION | Age: 33
End: 2023-02-15

## 2023-02-15 DIAGNOSIS — M54.2 CHRONIC NECK PAIN: Primary | ICD-10-CM

## 2023-02-15 DIAGNOSIS — G89.29 CHRONIC NECK PAIN: Primary | ICD-10-CM

## 2023-02-15 DIAGNOSIS — M54.2 NECK PAIN: ICD-10-CM

## 2023-02-15 NOTE — PROGRESS NOTES
PT Evaluation     Today's date: 2/15/2023  Patient name: Breanna Patel  : 1990  MRN: [id number]  Referring provider: Luz Marina Lara DO  Dx:   Encounter Diagnosis     ICD-10-CM    1  Chronic neck pain  M54 2 PT plan of care cert/re-cert    P70 01       2  Neck pain  M54 2 Ambulatory referral to Physical Therapy     PT plan of care cert/re-cert          Start Time: 7734  Stop Time: 945  Total time in clinic (min): 41 minutes    Assessment  Assessment details: Lisa Ahmadi is a 33year old female presenting to OPPT for chronic radicular neck pain into L UE  Upon evaluation they show impairments in the following areas: decreased generalized bilateral UE strength throughout deltoid, periscapular, and rotator cuff musculature  L sided  strength less than R side, increased symptom irritability with essentially any movements, numbness and tingling through L side despite being intact to light touch on LUE  Patient has extreme tender points that cause referred pain points proximally towards upper L jaw when palpated in Left upper trap as well as medial border of L scapula  Tenderness to joint play of spinous process throughout cervical spine but most at C3-C6 that can be described as hyperalgesic  Increased stiffness of Upper and middle thoracic spine noted  Decreased activity tolerance to work and general movement activities that increase LUE symptoms as well as symptoms that come from L/S and are radicular  These impairments limit their ability to perform daily activities as well as their previous level of function causing decreased quality of life  Patient requires continued skilled PT services in order to improve aforementioned impairments so that they are able to return to highest level of function possible with goal of returning back to work full time, as well as returning to regular gym activities   Without initiation of skilled PT services, patient will continue to have neck pain that will further limit daily and functional tasks that will continue to decrease quality of life for patient  Patient would largely benefit from further pain neuroscience education in attempts to reduce chronic pain and successfully grade activities with decreased pain response without fear avoidance  Impairments: abnormal or restricted ROM, activity intolerance, impaired physical strength, lacks appropriate home exercise program, pain with function, weight-bearing intolerance and poor posture     Symptom irritability: highUnderstanding of Dx/Px/POC: good   Prognosis: good    Goals  Short-Term Goals (4 weeks)   1  Patient will decrease worst rating of pain by 2/10 to improve quality of life    2  Pt will increase strength by 1/2 MMT to improve quality of life with improved efficiency of transfers and daily activities  3  Patient will be able to perform home and household duties with 2/10 reduction in pain indicating improved QOL   4  Patient will improve C/S AROM by 25% indicating improved mobility of affected area       Long-Term Goals (8 weeks)   1  Patient's C/S will be [facility] indicating improvement in C/S AROM capacities   2  Patient will decrease pain by 4/10 at worst in comparison to IE indicating significant reduction in pain and improved quality of life   3  Patient will improve DNF to within normal limits for her gender and age related groups    4  Patient will be able to perform household and hobby activities without increase in pain > or equal to 2/10 indicating ability to independently manage pain symptoms to accomplish daily activities  5  Patient will be independent with HEP with good form accomplished   6   Patient will have no radicular symptoms in LUE       Plan  Patient would benefit from: PT eval and skilled physical therapy  Planned modality interventions: TENS, cryotherapy and thermotherapy: hydrocollator packs  Planned therapy interventions: joint mobilization, manual therapy, massage, neuromuscular re-education, patient education, self care, strengthening, stretching, therapeutic activities, therapeutic exercise, graded exercise, home exercise program, activity modification, balance, balance/weight bearing training, flexibility and functional ROM exercises  Frequency: 2x week  Duration in visits: 16  Duration in weeks: 8  Plan of Care beginning date: 2/15/2023  Plan of Care expiration date: 4/15/2023  Treatment plan discussed with: patient        Subjective Evaluation    History of Present Illness  Date of onset: 2/15/2022  Mechanism of injury: Owen Chase is a 33year old patient presenting to OPPT for initial evaluation regarding neck pain with radicular components  Also has had low back pain in the past that was unchanged with physical therapy  Referral has been placed for hand surgery consult for existing carpal tunnel syndrome on R side  Has history of TMJ symptomology (on R side) and chronic headaches as well  Chronic L sided neck pain is radicular in nature intermittently into LUE with occasional numbness and tingling accompanied  X-ray for cervical spine ordered as well  Reports that her back is more painful  Her L side also feels just fully tense  She got a massage for her neck and LUE which she noticed that this has helped a lot  Feels that she has a constant pain in the back and in the groin area down to the leg  She works at [employer] where she notices that if she works a lot this causes a flare up in her LUE pain  Has since gone part time at [employer] secondary to the pain because it is not worth it  Has started roughly a year ago  Secondary to wekaness on the RUE, is forced to carry heavy things constantly in the LUE that she believes is causing an overload  Reports that the jaw pain has continuously gotten worse             Recurrent probem    Pain  Current pain ratin ("Whole left side" )  At best pain ratin (Massage, hot tub, sauna)  At worst pain rating: 10 ("Sometimes 15" )  Location: LUE and LLE - described in subjective  Quality: Constant UE - throbbing, burning, tingling  LE - constant tingle, throb, sharp in groin  Relieving factors: change in position, heat and ice (Hot tub, cold pool, massages, massage gun )  Aggravating factors: stair climbing, walking and standing (Working)  Symptom course: A little better - not so constantly tight because I've worked less  Social Support  Steps to enter house: yes  3  Stairs in house: yes   24  Lives in: multiple-level home  Lives with: young children (Everette, two kids)    Employment status: working  Hand dominance: right  Exercise history: Walking, enjoys to go to the gym     Treatments  Previous treatment: massage  Current treatment: massage  Discharged from (in last 30 days): inpatient hospitalization  Patient Goals  Patient goals for therapy: increased strength, decreased pain, return to sport/leisure activities and return to work  Patient goal: "I just want to get back to normal"         Objective     Static Posture     Head  Forward      Neurological Testing     Sensation   Cervical/Thoracic   Left   Intact: light touch    Right   Diminished: light touch    Reflexes   Left   Biceps (C5/C6): normal (2+)  Brachioradialis (C6): normal (2+)  Triceps (C7): normal (2+)  Swift's reflex: negative    Right   Biceps (C5/C6): trace (1+)  Brachioradialis (C6): trace (1+)  Triceps (C7): trace (1+)  Swift's reflex: negative    Active Range of Motion   Cervical/Thoracic Spine       Cervical    Flexion: 41 (L side of neck ) degrees  with pain Restriction level: moderate  Extension: 66 degrees      Left lateral flexion: 41 degrees      Right lateral flexion: 33 (L UT ) degrees     with pain  Left rotation: 72 degrees Restriction level: minimal  Right rotation: 91 degrees         Thoracic    Flexion:  Restriction level: minimal  Extension:  Restriction level: maximal  Left rotation:  Restriction level: moderate  Right rotation: Active right thoracic rotation: "It hurts down"  with pain Restriction level: moderate  Left Shoulder   Flexion: 160 degrees WFL and with pain  Abduction: 155 degrees WFL and with pain    Right Shoulder   Flexion: 160 degrees WFL and with pain  Abduction: 155 degrees WFL and with pain    Joint Play   Joints within functional limits: C1, C2, C3, T8, T9, T10, T11 and T12     Hypomobile: C4, C5, C6, C7, T1, T2, T3, T4, T5, T6 and T7     Pain: C1, C2, C3, C4, C5, C6 and C7     Strength/Myotome Testing     Left Shoulder     Planes of Motion   Flexion: 3+   Extension: 4   Abduction: 4   External rotation at 0°: 4-   Internal rotation at 0°: 4-     Right Shoulder     Planes of Motion   Flexion: 4-   Extension: 4   Abduction: 4   External rotation at 0°: 4-   Internal rotation at 0°: 4-     Left Elbow   Flexion: 4+  Extension: 3+    Right Elbow   Flexion: 4+  Extension: 3+    Tests   Cervical   Positive cervical distraction test and neck flexor muscle endurance test   Negative Lhermitte's sign, alar ligament test, Sharp-Ivette test and transverse ligament test      Left   Negative Spurling's Test A  Thoracic   Negative slump test      Left Shoulder   Positive [place] Leep  Negative ULTT1  Right Shoulder   Negative Flo and ULTT1            Precautions: Carpal Tunnel R, Hx of chronic LBP with L sided radicular sx, Chronic Headaches, Chronic Pain    Plan of Care beginning date: 2/15/2023  Plan of Care expiration date: 4/15/2023    Manuals 2/15 IE            TPR/MFR medial border scapula MH            IASTM/STM b/l UT MH STM             SOR MH            T/S Mobilizations             CPA/UPA C/S              Neuro Re-Ed             DNF Endurance              Rows/Ext              RTC Isometric s             Serratus Anterior Motor control              Prone I,T,Y             TrA / Trunk stability progression                          Ther Ex             C/S Snags Rot + Ext  NV HEP            Push up +              TB IR/ER             Standing Flexion/Scaption TB              Horiz ABD             No Money  NV HEP            Shoulder Taps              UT/LT stretches  NV HEP            Ther Activity             Carry Series              Push up progression             Gait Training                                       Modalities             CP/MH PRN              PNE* 8'

## 2023-02-21 ENCOUNTER — OFFICE VISIT (OUTPATIENT)
Dept: PHYSICAL THERAPY | Facility: REHABILITATION | Age: 33
End: 2023-02-21

## 2023-02-21 DIAGNOSIS — G89.29 CHRONIC NECK PAIN: ICD-10-CM

## 2023-02-21 DIAGNOSIS — M54.2 CHRONIC NECK PAIN: ICD-10-CM

## 2023-02-21 DIAGNOSIS — M54.2 NECK PAIN: Primary | ICD-10-CM

## 2023-02-21 NOTE — PROGRESS NOTES
Daily Note     Today's date: 2023  Patient name: Jonatan Hays  : 1990  MRN: [id number]  Referring provider: Earle Archer DO  Dx:   Encounter Diagnosis     ICD-10-CM    1  Neck pain  M54 2       2  Chronic neck pain  M54 2     G89 29                      Subjective: Pt reports that she is having the same discomfort in her neck as her IE 7/10  Also notes L sided low back pain which is worse then her neck  Objective: See treatment diary below      Assessment: Tolerated treatment fair  A skilled therapist is needed today to ensure proper lifting mechanics were educated as well as to promote improved soft tissue mobility in the cervical region  Increased tightness was present in both sides of her neck as well as in between her shoulder blades  PT is helping the patient by strengthening her back muscles to take stress off of her neck muscles which she has been compensating with  Updated her HEP as well this session  Patient demonstrated fatigue post treatment, exhibited good technique with therapeutic exercises and would benefit from continued PT      Plan: Continue per plan of care        Precautions: Carpal Tunnel R, Hx of chronic LBP with L sided radicular sx, Chronic Headaches, Chronic Pain    Plan of Care beginning date: 2/15/2023  Plan of Care expiration date: 4/15/2023    Manuals 2/15 IE            TPR/MFR medial border scapula MH MM           IASTM/STM b/l UT MH STM  MM STM           SOR MH MM           T/S Mobilizations MH            CPA/UPA C/S              Neuro Re-Ed             DNF Endurance              Rows/Ext              RTC Isometric s             Serratus Anterior Motor control              Prone I,T,Y             TrA / Trunk stability progression                          Ther Ex             C/S Snags Rot + Ext  NV HEP 10"x10 ea           Push up +              TB IR/ER             Standing Flexion/Scaption TB              Horiz ABD             No Money  NV HEP GTB 2x10 Shoulder Taps              UT/LT stretches  NV HEP 10"x10           Ther Activity             Carry Series              Push up progression             Gait Training                                       Modalities             CP/MH PRN              PNE* 8'

## 2023-02-23 ENCOUNTER — COSMETIC (OUTPATIENT)
Dept: DERMATOLOGY | Facility: CLINIC | Age: 33
End: 2023-02-23

## 2023-02-23 DIAGNOSIS — L71.9 ACNE ROSACEA: Primary | ICD-10-CM

## 2023-02-23 DIAGNOSIS — L90.5 SCARRING: ICD-10-CM

## 2023-02-23 NOTE — PATIENT INSTRUCTIONS
Assessment and Plan:  Based on a thorough discussion of this condition and the management approach to it (including a comprehensive discussion of the known risks, side effects and potential benefits of treatment), the patient (family) agrees to implement the following specific plan:  Discussed with patient that before considering laser treatments or other options for scarring; her acne needs to be under control  Discussed patient having a consult with plastics for Chemical Peel options  It would be multiple treatments and insurance does not cover  Discussed PDL Laser with patient for possible treatment of Acne Rosacea/ scarring  Laser resurfacing would cost $1,000 for 3 treatments or $375 per one  Dr Megan Finnegan recommending at least 3 treatments  Advised patient resurfacing would not clear scars but possibly lessen them  Treatment is only done during winter months due to sun exposure  Discussed with patient how important it is to stop smoking as smoking can contribute to acne and rosacea  Discussed with patient that a prescription for a compounding medication will be sent to [facility]      Begin compound cream from [facility]   Email address: [email or web address]     We use this service to prescribe medications that are often not covered by insurance  Your physician will send your prescription to the pharmacy  You will receive an email from www [email or web address] where you will follow the instructions within the email to provide your billing and shipping information  Your medicine will be shipped directly to you  If you have any questions, you can call [phone] or email [email or web address]       Make sure all products you use on face are labeled as "non-comedongenic" or "oil-free" or " does not clog pores"  Acne can be frustrating and difficult to treat  Most acne regimens take 2-3 months to see an improvement, so stick with them   Don't give up! As always, call your doctor if you have any concerns about your medications      When outside we recommend using a wide brim hat, sunglasses, long sleeve and pants, sunscreen with SPF 09+ with reapplication every 2 hours, or SPF specific clothing

## 2023-02-23 NOTE — PROGRESS NOTES
[facility] Note     Patient Name: Zoie Ulloa  Encounter Date: 2/23/2023     Have you been cared for by a [facility] in the last 3 years and, if so, which description applies to you? Yes  I have been here within the last 3 years, and my medical history has NOT changed since that time  I am FEMALE/of child-bearing potential     REVIEW OF SYSTEMS:  Have you recently had or currently have any of the following? · No changes in my recent health  PAST MEDICAL HISTORY:  Have you personally ever had or currently have any of the following? If "YES," then please provide more detail  · No changes in my medical history  FAMILY HISTORY:  Any "first degree relatives" (parent, brother, sister, or child) with the following? • No changes in my family's known health  PATIENT EXPERIENCE:    • Do you want the Dermatologist to perform a COMPLETE skin exam today including a clinical examination under the "bra and underwear" areas? NO  • If necessary, do we have your permission to call and leave a detailed message on your Preferred Phone number that includes your specific medical information? Yes      Allergies   Allergen Reactions   • No Known Allergies       Current Outpatient Medications:   •  hydrOXYzine HCL (ATARAX) 25 mg tablet, TAKE 1 TABLET BY MOUTH EVERY 8 HOURS AS NEEDED FOR ANXIETY, Disp: 30 tablet, Rfl: 0  •  meloxicam (MOBIC) 15 mg tablet, Take 1 tablet (15 mg total) by mouth daily as needed for moderate pain, Disp: 30 tablet, Rfl: 1          • Whom besides the patient is providing clinical information about today's encounter?   o NO ADDITIONAL HISTORIAN (patient alone provided history)    Physical Exam and Assessment/Plan by Diagnosis:      ACNE SCARRING/ ACNE ROSACEA     Physical Exam:  • Anatomic Location Affected: Face  • Morphological Description:  Moderate ice-pick scarring, scattered red papules, no comedones present       Additional History of Present Condition: Patient presents for consult for possible laser treatment for acne scarring  Patient previously had a consult with Dr Naima Morales 2/2022 where she was prescribed Doxycycline and Retin A 0 025% which was not helpful  Last visit was 7/2022 and was prescribed a topical from [facility] but does not know name  Says she only used it a few times and broke out  Is not on birth control currently because she is a smoker  Assessment and Plan:  Based on a thorough discussion of this condition and the management approach to it (including a comprehensive discussion of the known risks, side effects and potential benefits of treatment), the patient (family) agrees to implement the following specific plan:  • Discussed with patient that before considering laser treatments or other options for scarring, her acne rosacea needs to be under control  • Discussed patient having a consult with plastics for Chemical Peel options  It would be multiple treatments and insurance does not cover  • Discussed PDL Laser with patient for possible treatment of Acne Rosacea/ scarring  Laser resurfacing would cost $1,000 for 3 treatments or $375 per one  Dr Naima Morales recommending at least 3 treatments  Advised patient resurfacing would not clear scars but possibly lessen them  Treatment is only done during winter months due to sun exposure  • Discussed with patient how important it is to stop smoking as smoking can contribute to acne and rosacea  • Discussed with patient that a prescription for a compounding medication will be sent to [facility]  If it is the same cream as last time, please let us know and do not buy it  Begin compound cream from [facility]  [facility]   Email address: [email or web address]     We use this service to prescribe medications that are often not covered by insurance      Your physician will send your prescription to the pharmacy      You will receive an email from [email or web address] where you will follow the instructions within the email to provide your billing and shipping information      Your medicine will be shipped directly to you      If you have any questions, you can call [phone] or email [email or web address]       Make sure all products you use on face are labeled as "non-comedongenic" or "oil-free" or " does not clog pores"     Acne can be frustrating and difficult to treat  Most acne regimens take 2-3 months to see an improvement, so stick with them  Don't give up! As always, call your doctor if you have any concerns about your medications  When outside we recommend using a wide brim hat, sunglasses, long sleeve and pants, sunscreen with SPF 84+ with reapplication every 2 hours, or SPF specific clothing      Scribe Attestation    I,:  Liberty Muro am acting as a scribe while in the presence of the attending physician :       I,:  Sonam Lewis MD personally performed the services described in this documentation    as scribed in my presence :         The patient was seen and discussed with Dr Luma Duggan       RTC: October for acne rosacea follow-up/ consideration of lasering     [facility]  Dermatology PGY-4 Resident Physician

## 2023-02-24 ENCOUNTER — OFFICE VISIT (OUTPATIENT)
Dept: PHYSICAL THERAPY | Facility: REHABILITATION | Age: 33
End: 2023-02-24

## 2023-02-24 DIAGNOSIS — G89.29 CHRONIC NECK PAIN: ICD-10-CM

## 2023-02-24 DIAGNOSIS — M54.2 NECK PAIN: Primary | ICD-10-CM

## 2023-02-24 DIAGNOSIS — M54.2 CHRONIC NECK PAIN: ICD-10-CM

## 2023-02-24 NOTE — PROGRESS NOTES
Daily Note     Today's date: 2023  Patient name: Adrian Mtz  : 1990  MRN: [id number]  Referring provider: Deangelo Alvarez DO  Dx:   Encounter Diagnosis     ICD-10-CM    1  Neck pain  M54 2       2  Chronic neck pain  M54 2     G89 29           Start Time: 915  Stop Time: 945  Total time in clinic (min): 30 minutes    Subjective: Patient reports her leg issues feel worst than her neck however she continues with neck and has some jaw pain possibly from doing one of the neck stretches with the towel  Objective: See treatment diary below      Assessment: Tolerated treatment well  She did well during exercises and progression of shoulder taps, rows, extensions and horizontal abduction to improve scapular and postural strengthening  PT is helping the patient by strengthening her back muscles to take stress off of her neck muscles which she has been compensating with  Updated her HEP as well this session  Patient demonstrated fatigue post treatment, exhibited good technique with therapeutic exercises and would benefit from continued PT      Plan: Continue per plan of care        Precautions: Carpal Tunnel R, Hx of chronic LBP with L sided radicular sx, Chronic Headaches, Chronic Pain    Plan of Care beginning date: 2/15/2023  Plan of Care expiration date: 4/15/2023    Manuals 2/15 IE           TPR/MFR medial border scapula MH MM KL          IASTM/STM b/l UT MH STM  MM STM KL  STM          SOR MH MM nv          T/S Mobilizations MH            CPA/UPA C/S              Neuro Re-Ed             DNF Endurance              Rows/Ext    GTB  2x10          RTC Isometric s             Serratus Anterior Motor control              Prone I,T,Y             TrA / Trunk stability progression                          Ther Ex             C/S Snags Rot + Ext  NV HEP 10"x10 ea HELD  Jaw pain          Push up +              TB IR/ER             Standing Flexion/Scaption TB              Horiz ABD   [place] TB wall  2x10          No Money  NV HEP GTB 2x10 GTB  2x10          Shoulder Taps    20x          UT/LT stretches  NV HEP 10"x10 10"x10 ea          Ther Activity             Carry Series              Push up progression             Gait Training                                       Modalities             CP/MH PRN              PNE* 8'

## 2023-02-27 ENCOUNTER — OFFICE VISIT (OUTPATIENT)
Dept: PHYSICAL THERAPY | Facility: REHABILITATION | Age: 33
End: 2023-02-27

## 2023-02-27 DIAGNOSIS — M54.2 NECK PAIN: Primary | ICD-10-CM

## 2023-02-27 DIAGNOSIS — G89.29 CHRONIC NECK PAIN: ICD-10-CM

## 2023-02-27 DIAGNOSIS — M54.2 CHRONIC NECK PAIN: ICD-10-CM

## 2023-02-27 NOTE — PROGRESS NOTES
Daily Note     Today's date: 2023  Patient name: Blossom Valdes  : 1990  MRN: [id number]  Referring provider: Gabriela Michael DO  Dx:   Encounter Diagnosis     ICD-10-CM    1  Neck pain  M54 2       2  Chronic neck pain  M54 2     G89 29                      Subjective: Patient reports high levels of neck and left-sided jaw pain today  She states she sleeps on her stomach secondary to breathing issues  She notes her sleeping position irritates her jaw  Objective: See treatment diary below      Assessment: Reviewed and stressed the importance of maintaining good posture throughout the day  Progressed scapular strengthening and added supine chin tuck with the intention to progress to DNF strengthening in later sessions  Good overall tolerance to treatment today  Skilled therapy still needed to correct proper mechanics during exercises and ADLs and progress patient's program despite chronic high pain levels  PT continues to help improve patient symptoms with less pain and increased ROM noted after therapy sessions  Plan: Continue per plan of care        Precautions: Carpal Tunnel R, Hx of chronic LBP with L sided radicular sx, Chronic Headaches, Chronic Pain    Plan of Care beginning date: 2/15/2023  Plan of Care expiration date: 4/15/2023    Manuals 2/15 IE          TPR/MFR medial border scapula MH MM KL NB         IASTM/STM b/l UT MH STM  MM STM KL  STM NB IASTM         SOR MH MM nv NB         T/S Mobilizations MH            CPA/UPA C/S              Neuro Re-Ed             DNF Endurance              Rows/Ext    GTB  2x10 GTB 3x10/2x10         RTC Isometric s             Serratus Anterior Motor control              Prone I,T,Y    I's 3"x10         TrA / Trunk stability progression                          Ther Ex             C/S Snags Rot + Ext  NV HEP 10"x10 ea HELD  Jaw pain held         Push up +              TB IR/ER             Standing Flexion/Scaption TB Horiz ABD   [place] TB wall  2x10 Pink TB wall 2x10         No Money  NV HEP GTB 2x10 GTB  2x10 GTB 2x10         Shoulder Taps    20x          UT/LT stretches  NV HEP 10"x10 10"x10 ea 10"x10         Ther Activity             Carry Series              Push up progression             Gait Training                                       Modalities             CP/MH PRN              PNE* 8'

## 2023-03-03 ENCOUNTER — APPOINTMENT (OUTPATIENT)
Dept: PHYSICAL THERAPY | Facility: REHABILITATION | Age: 33
End: 2023-03-03

## 2023-03-06 ENCOUNTER — OFFICE VISIT (OUTPATIENT)
Dept: PHYSICAL THERAPY | Facility: REHABILITATION | Age: 33
End: 2023-03-06

## 2023-03-06 DIAGNOSIS — M54.2 NECK PAIN: Primary | ICD-10-CM

## 2023-03-06 DIAGNOSIS — G89.29 CHRONIC NECK PAIN: ICD-10-CM

## 2023-03-06 DIAGNOSIS — M54.2 CHRONIC NECK PAIN: ICD-10-CM

## 2023-03-06 NOTE — PROGRESS NOTES
Daily Note     Today's date: 3/6/2023  Patient name: Glen Grimes  : 1990  MRN: [id number]  Referring provider: Jenny Hamilton DO  Dx:   Encounter Diagnosis     ICD-10-CM    1  Neck pain  M54 2       2  Chronic neck pain  M54 2     G89 29                      Subjective: Pt reports she continues to have pain in her cervical spine  Notes the pain has been constant and varies in intensity  States she notices over using her left arm aggravates the symptoms  Objective: See treatment diary below      Assessment: Tolerated treatment well  Pt's presentation also likely due to contributions of not full understanding the nature of the cause of her condition, the chronicity of the symptoms, reduced activity levels, impaired sleep quality, and stress  Patient exhibited good technique with therapeutic exercises and would benefit from continued PT      Plan: Progress treatment as tolerated         Precautions: Carpal Tunnel R, Hx of chronic LBP with L sided radicular sx, Chronic Headaches, Chronic Pain    Plan of Care beginning date: 2/15/2023  Plan of Care expiration date: 4/15/2023    Manuals 2/15 IE 2/21 2/24 2/27 3/6        TPR/MFR medial border scapula MH MM KL NB         IASTM/STM b/l UT MH STM  MM STM KL  STM NB IASTM ODIN STM        SOR MH MM nv NB ODIN        T/S Mobilizations MH            CPA/UPA C/S              Neuro Re-Ed             DNF Endurance              Rows/Ext    GTB  2x10 GTB 3x10/2x10         RTC Isometric s             Serratus Anterior Motor control              Prone I,T,Y    I's 3"x10         TrA / Trunk stability progression                          Ther Ex             C/S Snags Rot + Ext  NV HEP 10"x10 ea HELD  Jaw pain held         Push up +              TB IR/ER             Standing Flexion/Scaption TB              Horiz ABD   [place] TB wall  2x10 Pink TB wall 2x10         No Money  NV HEP GTB 2x10 GTB  2x10 GTB 2x10         Shoulder Taps    20x          UT/LT stretches  NV HEP 10"x10 10"x10 ea 10"x10         Ther Activity             Carry Series              Push up progression             Gait Training                                       Modalities             CP/MH PRN              PNE* 8'

## 2023-03-10 ENCOUNTER — OFFICE VISIT (OUTPATIENT)
Dept: PHYSICAL THERAPY | Facility: REHABILITATION | Age: 33
End: 2023-03-10

## 2023-03-10 DIAGNOSIS — M54.2 CHRONIC NECK PAIN: ICD-10-CM

## 2023-03-10 DIAGNOSIS — M54.2 NECK PAIN: Primary | ICD-10-CM

## 2023-03-10 DIAGNOSIS — G89.29 CHRONIC NECK PAIN: ICD-10-CM

## 2023-03-10 NOTE — PROGRESS NOTES
Daily Note     Today's date: 3/10/2023  Patient name: Jerel Ravi  : 1990  MRN: [id number]  Referring provider: Moises Vegas DO  Dx:   Encounter Diagnosis     ICD-10-CM    1  Neck pain  M54 2       2  Chronic neck pain  M54 2     G89 29                      Subjective: pt reports with c/o constant pain in left cervical region with radiating pain into L UE  She noted radiating pain above medial aspect of L elbow  She noted relief following last visit for a couple of days  Objective: See treatment diary below      Assessment: Tolerated treatment well and without complaints  Positive relief noted post manuals and exercises  She noted most challenge with TB horiz abd  Patient demonstrated fatigue post treatment, exhibited good technique with therapeutic exercises and would benefit from continued PT  Plan: Continue per plan of care  Progress treatment as tolerated         Precautions: Carpal Tunnel R, Hx of chronic LBP with L sided radicular sx, Chronic Headaches, Chronic Pain    Plan of Care beginning date: 2/15/2023  Plan of Care expiration date: 4/15/2023    Manuals 2/15 IE 2/21 2/24 2/27 3/6 3/10       TPR/MFR medial border scapula MH MM KL NB         IASTM/STM b/l UT MH STM  MM STM KL  STM NB IASTM ODIN STM TE STM       SOR MH MM nv NB ODIN TE       T/S Mobilizations MH            CPA/UPA C/S              Neuro Re-Ed             DNF Endurance              Rows/Ext    GTB  2x10 GTB 3x10/2x10  GTB 3x10 ea       RTC Isometric s             Serratus Anterior Motor control              Prone I,T,Y    I's 3"x10  I's 3"x10       TrA / Trunk stability progression                          Ther Ex             C/S Snags Rot + Ext  NV HEP 10"x10 ea HELD  Jaw pain held         Push up +              TB IR/ER             Standing Flexion/Scaption TB              Horiz ABD   [place] TB wall  2x10 Pink TB wall 2x10  rtb 2x10       No Money  NV HEP GTB 2x10 GTB  2x10 GTB 2x10  GTB 2x10       Shoulder Taps 20x          UT/LT stretches  NV HEP 10"x10 10"x10 ea 10"x10  30"x3       Ther Activity             Carry Series              Push up progression             Gait Training                                       Modalities             CP/MH PRN       Pre 90/90       PNE* 8'

## 2023-03-13 ENCOUNTER — OFFICE VISIT (OUTPATIENT)
Dept: PHYSICAL THERAPY | Facility: REHABILITATION | Age: 33
End: 2023-03-13

## 2023-03-13 DIAGNOSIS — M54.2 NECK PAIN: Primary | ICD-10-CM

## 2023-03-13 DIAGNOSIS — G89.29 CHRONIC NECK PAIN: ICD-10-CM

## 2023-03-13 DIAGNOSIS — M54.2 CHRONIC NECK PAIN: ICD-10-CM

## 2023-03-13 NOTE — PROGRESS NOTES
Daily Note     Today's date: 3/13/2023  Patient name: Dmitri Ochoa  : 1990  MRN: [id number]  Referring provider: Luc Tony DO  Dx:   Encounter Diagnosis     ICD-10-CM    1  Neck pain  M54 2       2  Chronic neck pain  M54 2     G89 29                      Subjective: Pt comes to therapy reporting soreness over her left shoulder  Attributes soreness to sleeping in an odd position  Objective: See treatment diary below      Assessment: Tolerated treatment well  Tenderness noted over left UT  Patient demonstrated fatigue post treatment, exhibited good technique with therapeutic exercises and would benefit from continued PT      Plan: Progress treatment as tolerated         Precautions: Carpal Tunnel R, Hx of chronic LBP with L sided radicular sx, Chronic Headaches, Chronic Pain    Plan of Care beginning date: 2/15/2023  Plan of Care expiration date: 4/15/2023    Manuals 2/15 IE 2/21 2/24 2/27 3/6 3/10 3/13      TPR/MFR medial border scapula MH MM KL NB         IASTM/STM b/l UT MH STM  MM STM KL  STM NB IASTM ODIN STM TE STM ODIN STM      SOR MH MM nv NB ODIN TE ODIN      T/S Mobilizations MH            CPA/UPA C/S              Neuro Re-Ed             DNF Endurance              Rows/Ext    GTB  2x10 GTB 3x10/2x10  GTB 3x10 ea GTB 2x10      CS nods supine       5"x20      Prone I,T,Y    I's 3"x10  I's 3"x10 I's 3" 2x10      TrA / Trunk stability progression                          Ther Ex             C/S Snags Rot + Ext  NV HEP 10"x10 ea HELD  Jaw pain held                      Horiz ABD   [place] TB wall  2x10 Pink TB wall 2x10  rtb 2x10 GTB 2x10      No Money  NV HEP GTB 2x10 GTB  2x10 GTB 2x10  GTB 2x10 GTB 2x10      Shoulder Taps    20x          UT/LT stretches  NV HEP 10"x10 10"x10 ea 10"x10  30"x3 30"x3      Ther Activity             Carry Series              Push up progression             Gait Training                                       Modalities             CP/MH PRN       Pre  Pre  PNE* 8'

## 2023-03-14 ENCOUNTER — OFFICE VISIT (OUTPATIENT)
Dept: OBGYN CLINIC | Facility: CLINIC | Age: 33
End: 2023-03-14

## 2023-03-14 ENCOUNTER — APPOINTMENT (OUTPATIENT)
Dept: RADIOLOGY | Age: 33
End: 2023-03-14

## 2023-03-14 VITALS
SYSTOLIC BLOOD PRESSURE: 127 MMHG | BODY MASS INDEX: 29.82 KG/M2 | HEART RATE: 85 BPM | WEIGHT: 190 LBS | HEIGHT: 67 IN | DIASTOLIC BLOOD PRESSURE: 84 MMHG

## 2023-03-14 DIAGNOSIS — G56.01 CARPAL TUNNEL SYNDROME OF RIGHT WRIST: Primary | ICD-10-CM

## 2023-03-14 DIAGNOSIS — M54.2 NECK PAIN: ICD-10-CM

## 2023-03-14 DIAGNOSIS — G56.02 CARPAL TUNNEL SYNDROME ON LEFT: ICD-10-CM

## 2023-03-14 NOTE — PROGRESS NOTES
ORTHOPAEDIC HAND, WRIST, AND ELBOW OFFICE  VISIT       ASSESSMENT/PLAN:      33 y o  female with bilateral hand numbness R > L as right side is constant in her whole extremity  EMG from 2021 with right carpal tunnel syndrome  She has neck pain as well, and is attending therapy for her neck  Etiology of above diagnosis was discussed at length with patient  EMG order was placed, we will see if this order can be added onto the existing EMG lower limb testing being done on 3/22/23  Patient to continue bracing as needed, continue formal therapy, and NSAIDs for pain as needed  She will return to the office after EMG testing for review of results and to discuss treatment options  The patient verbalized understanding of exam findings and treatment plan  We engaged in the shared decision-making process and treatment options were discussed at length with the patient  Surgical and conservative management discussed today along with risks and benefits  Diagnoses and all orders for this visit:    Carpal tunnel syndrome of right wrist  -     Ambulatory Referral to Hand Surgery    Carpal tunnel syndrome on left      Follow Up:  Return for after emg testing done  To Do Next Visit:  Re-evaluation of current issue      General Discussions:  Carpal Tunnel Syndrome: The anatomy and physiology of carpal tunnel syndrome was discussed with the patient today  Increase pressure localized under the transverse carpal ligament can cause pain, numbness, tingling, or dysesthesias within the median nerve distribution as well as feelings of fatigue, clumsiness, or awkwardness  These symptoms typically occur at night and worse in the morning upon waking  Eventually, untreated carpal tunnel syndrome can result in weakness and permanent loss of muscle within the thenar compartment of the hand  Treatment options were discussed with the patient    Conservative treatment includes nocturnal resting splints to keep the nerve in a neutral position, ergonomic changes within the work or home environment, activity modification, and tendon gliding exercises  Vitamin B6 one tablet daily over the counter may helpful to reduce symptoms  Steroid injections within the carpal canal can help a majority of patients, however this is often self-limited in a majority of patients  Surgical intervention to divide the transverse carpal ligament typically results in a long-lasting relief of the patient's complaints, with the recurrence rate of less than 1%                                                                                                                                   ____________________________________________________________________________________________________________________________________________      CHIEF COMPLAINT:  Chief Complaint   Patient presents with   • Right Hand - Numbness   • Left Hand - Numbness       SUBJECTIVE:  Adriana Dunlap is a 33y o  year old RHD female who presents for initial evaluation of right carpal tunnel syndrome at the request of Shannon De Leon PA-C  EMG was done on right arm 9/10/21  Patient has been attending formal therapy for chronic neck pain  Was being seen by ortho in 2020 and had formal carpal tunnel therapy then, she does still brace at night occasionally but states sometimes it makes things worse  She notes numbness and tingling in right thumb, index, long, and half of her ring fingers  Occasional numbness and tingling on left thumb, index, and long fingers  She states her left is not an issue for her right now  Patient reports weakened  strength, she does admit to dropping things  she would like to know if it is too late to have the carpal tunnel release done due to the fact that she doesn't have proper feeling on her right side anymore  She feels her biggest issue is regaining muscle strength in right arm/hand       Pain/symptom timing:  Worse during the day when active  Pain/symptom context: Worse with activites and work  Pain/symptom modifying factors:  Rest makes better, activities make worse  Pain/symptom associated signs/symptoms: none    Prior treatment   · NSAIDsNo   · Injections No   · Bracing/Orthotics Yes    Physical Therapy Yes     I have personally reviewed all the relevant PMH, PSH, SH, FH, Medications and allergies      PAST MEDICAL HISTORY:  Past Medical History:   Diagnosis Date   • Acne    • Anxiety    • Depression    • Hypertension        PAST SURGICAL HISTORY:  History reviewed  No pertinent surgical history  FAMILY HISTORY:  Family History   Problem Relation Age of Onset   • Cancer Father         non hodgkins lymphoma   • Cancer Paternal Grandmother    • Cancer Paternal Grandfather        SOCIAL HISTORY:  Social History     Tobacco Use   • Smoking status: Heavy Smoker     Packs/day: 0 50     Years: 1 20     Pack years: 0 60     Types: Cigarettes   • Smokeless tobacco: Never   • Tobacco comments:     8 cigarettes per day, No passive smoke exposure   Vaping Use   • Vaping Use: Never used   Substance Use Topics   • Alcohol use: Not Currently   • Drug use: Never       MEDICATIONS:    Current Outpatient Medications:   •  hydrOXYzine HCL (ATARAX) 25 mg tablet, TAKE 1 TABLET BY MOUTH EVERY 8 HOURS AS NEEDED FOR ANXIETY, Disp: 30 tablet, Rfl: 0  •  meloxicam (MOBIC) 15 mg tablet, Take 1 tablet (15 mg total) by mouth daily as needed for moderate pain, Disp: 30 tablet, Rfl: 1    ALLERGIES:  Allergies   Allergen Reactions   • No Known Allergies        REVIEW OF SYSTEMS:  Review of Systems   Constitutional: Negative for chills and fever  HENT: Negative for ear pain and sore throat  Eyes: Negative for pain and visual disturbance  Respiratory: Negative for cough and shortness of breath  Cardiovascular: Negative for chest pain and palpitations  Gastrointestinal: Negative for abdominal pain and vomiting  Genitourinary: Negative for dysuria and hematuria     Musculoskeletal: Negative for arthralgias and back pain  Skin: Negative for color change and rash  Neurological: Positive for numbness  Negative for seizures and syncope  All other systems reviewed and are negative  VITALS:  Vitals:    03/14/23 1007   BP: 127/84   Pulse: 85       LABS:  HgA1c: No results found for: HGBA1C  BMP:   Lab Results   Component Value Date    CALCIUM 8 9 10/24/2022    K 4 4 10/24/2022    CO2 24 10/24/2022     (H) 10/24/2022    BUN 13 10/24/2022    CREATININE 0 71 10/24/2022       _____________________________________________________  PHYSICAL EXAMINATION:  General: well developed and well nourished, alert, oriented times 3 and appears comfortable  Psychiatric: Normal  HEENT: Normocephalic, Atraumatic Trachea Midline, No torticollis  Pulmonary: No audible wheezing or respiratory distress   Abdomen/GI: Non tender, non distended   Cardiovascular: No pitting edema, 2+ radial pulse   Skin: No masses, erythema, lacerations, fluctation, ulcerations  Neurovascular: Sensation Intact to the Median, Ulnar, Radial Nerve, Motor Intact to the Median, Ulnar, Radial Nerve and Pulses Intact  Musculoskeletal: Normal, except as noted in detailed exam and in HPI  MUSCULOSKELETAL EXAMINATION:    Bilateral Carpal Tunnel Exam:    Positive thenar atrophy  Positive phalen's test  Positive carpal tunnel compression  Positive tinels over median nerve at the wrist   Opposition strength 5/5  Abduction strength 5/5       2 point discrimination is 5 mm throughout       ___________________________________________________  STUDIES REVIEWED:  EMG 1 limb - right arm - 9/10/21:  Impression:   Abnormal study  These electrodiagnostic findings are most consistent with a mild, median mononeuropathy across the right wrist (carpal tunnel syndrome)           PROCEDURES PERFORMED:  Procedures  No Procedures performed today    _____________________________________________________      Jeaneth Araujo    I,:  Radha Reeder am acting as a scribe while in the presence of the attending physician :       I,:  Angel Mccarty MD personally performed the services described in this documentation    as scribed in my presence :

## 2023-03-17 ENCOUNTER — OFFICE VISIT (OUTPATIENT)
Dept: PHYSICAL THERAPY | Facility: REHABILITATION | Age: 33
End: 2023-03-17

## 2023-03-17 DIAGNOSIS — G89.29 CHRONIC NECK PAIN: ICD-10-CM

## 2023-03-17 DIAGNOSIS — M54.2 CHRONIC NECK PAIN: ICD-10-CM

## 2023-03-17 DIAGNOSIS — M54.2 NECK PAIN: Primary | ICD-10-CM

## 2023-03-17 NOTE — PROGRESS NOTES
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  She noted she continues with increased pain pre-tx  Objective: See treatment diary below      Assessment: Tolerated treatment well; Decreased pain post treatment  Patient demonstrated fatigue post treatment, exhibited good technique with therapeutic exercises and would benefit from continued PT      Plan: Continue per plan of care  Progress treatment as tolerated         Precautions: Carpal Tunnel R, Hx of chronic LBP with L sided radicular sx, Chronic Headaches, Chronic Pain    Plan of Care beginning date: 2/15/2023  Plan of Care expiration date: 4/15/2023    Manuals 2/15 IE 2/21 2/24 2/27 3/6 3/10 3/13 3/17     TPR/MFR medial border scapula MH MM KL NB         IASTM/STM b/l UT MH STM  MM STM KL  STM NB IASTM ODIN STM TE STM ODIN STM TE STM     SOR MH MM nv NB ODIN TE ODIN TE     T/S Mobilizations MH            CPA/UPA C/S              Neuro Re-Ed             DNF Endurance              Rows/Ext    GTB  2x10 GTB 3x10/2x10  GTB 3x10 ea GTB 2x10      CS nods supine       5"x20 5"x5 p! held     Prone I,T,Y    I's 3"x10  I's 3"x10 I's 3" 2x10 nv     TrA / Trunk stability progression                          Ther Ex             C/S Snags Rot + Ext  NV HEP 10"x10 ea HELD  Jaw pain held         TB rows/ext        GTB  5"x20     Horiz ABD   Pink TB wall  2x10 Pink TB wall 2x10  rtb 2x10 GTB 2x10 GTB  5"x20     No Money  NV HEP GTB 2x10 GTB  2x10 GTB 2x10  GTB 2x10 GTB 2x10 GTB  5"x20     Shoulder Taps    20x          UT/LT stretches  NV HEP 10"x10 10"x10 ea 10"x10  30"x3 30"x3 30"x3 ea     Ther Activity             Carry Series              Push up progression             Gait Training                                       Modalities             CP/MH PRN       Pre 90/90 Pre 90/90 Pre 90/90     PNE* 8'

## 2023-03-21 NOTE — PROGRESS NOTES
Assessment:  1  Sacroiliitis ([address] ) - Left    2  Lumbar radiculitis    3  Low back pain with sciatica, sciatica laterality unspecified, unspecified back pain laterality, unspecified chronicity    4  Neck pain    5  Myofascial pain syndrome        Plan:  1  Based on patient report and physical exam, the patient's symptomatology does seem to be consistent with sacroiliac mediated pain from sacroiliitis  We will schedule the patient for a left SIJ injection to decrease any inflammatory component of the patient's pain symptoms  complete risks and benefits including bleeding, infection, tissue reaction, nerve injury and allergic reaction were discussed  The patient was agreeable and verbalized an understanding  2  I will order an MRI of the cervical spine without contrast  3  Patient was encouraged to trial meloxicam as prescribed  4  I will trial tizanidine 2 mg nightly as needed myofascial pain  I advised the patient that they should not drive or operate machinery while on this medication until they see how it affects them, as it could cause lethargy and mental cloudyness  I advised the patient to call our office if they experience any side effects or issues with the medication changes  The patient verbalized an understanding  5   Patient may continue Tylenol as needed and should not exceed more than 3000 mg in 24 hours  6  Continue with home exercise program as taught by physical therapy  7  Follow-up after procedure or sooner if needed    History of Present Illness: The patient is a 33 y o  female last seen on 02/09/2023 who presents for a follow up office visit in regards to chronic neck pain with associated numbness and pain into the right upper extremity, left low back pain and right lower extremity pain  Patient denies bowel or bladder incontinence, or saddle anesthesia    Patient did complete EMG of the bilateral upper extremities and left lower extremity was normal and actually showed improvement of carpal tunnel syndrome in the right upper extremity  MRI of the lumbar spine showed a disc bulge at L4-5 without any significant central or foraminal stenosis, otherwise unremarkable  She has been participating in physical therapy and feels like her symptoms are only worsening  Meloxicam was prescribed last office visit however never picked up from the pharmacy  She was also prescribed duloxetine in the past by her PCP however was also not picked up at the pharmacy and fear of side effects  The patient rates her pain a 10 out of 10 on the numeric pain rating scale  She has constant pain throughout the day which is described as burning, sharp, throbbing, pressure-like, shooting, numbness and pins-and-needles    I have personally reviewed and/or updated the patient's past medical history, past surgical history, family history, social history, current medications, allergies, and vital signs today  Review of Systems:    Review of Systems   Respiratory: Negative for shortness of breath  Cardiovascular: Negative for chest pain  Gastrointestinal: Negative for constipation, diarrhea, nausea and vomiting  Musculoskeletal: Positive for gait problem  Negative for arthralgias, joint swelling and myalgias  Skin: Negative for rash  Neurological: Positive for dizziness  Negative for seizures and weakness  All other systems reviewed and are negative  Past Medical History:   Diagnosis Date   • Acne    • Anxiety    • Depression    • Hypertension        No past surgical history on file      Family History   Problem Relation Age of Onset   • Cancer Father         non hodgkins lymphoma   • Cancer Paternal Grandmother    • Cancer Paternal Grandfather        Social History     Occupational History   • Occupation: NOT EMPLOYED   Tobacco Use   • Smoking status: Heavy Smoker     Packs/day: 0 50     Years: 1 20     Pack years: 0 60     Types: Cigarettes   • Smokeless tobacco: Never   • Tobacco comments:     8 cigarettes per day, No passive smoke exposure   Vaping Use   • Vaping Use: Never used   Substance and Sexual Activity   • Alcohol use: Not Currently   • Drug use: Never   • Sexual activity: Yes     Partners: Male         Current Outpatient Medications:   •  hydrOXYzine HCL (ATARAX) 25 mg tablet, TAKE 1 TABLET BY MOUTH EVERY 8 HOURS AS NEEDED FOR ANXIETY, Disp: 30 tablet, Rfl: 0  •  meloxicam (MOBIC) 15 mg tablet, Take 1 tablet (15 mg total) by mouth daily as needed for moderate pain, Disp: 30 tablet, Rfl: 1  •  tiZANidine (ZANAFLEX) 2 mg tablet, Take 1 tablet (2 mg total) by mouth daily at bedtime as needed for muscle spasms, Disp: 30 tablet, Rfl: 1    Allergies   Allergen Reactions   • No Known Allergies        Physical Exam:    /73   Pulse 80   Ht 5' 7" (1 702 m)   Wt 86 2 kg (190 lb)   BMI 29 76 kg/m²     Constitutional:normal, well developed, well nourished, alert, in no distress and non-toxic and no overt pain behavior  Eyes:anicteric  HEENT:grossly intact  Neck:supple, symmetric, trachea midline and no masses   Pulmonary:even and unlabored  Cardiovascular:No edema or pitting edema present  Skin:Normal without rashes or lesions and well hydrated  Psychiatric:Mood and affect appropriate  Neurologic:Cranial Nerves II-XII grossly intact  Musculoskeletal:normal gait left SI joint tender to palpation  [place] Obrien   Positive Haseeb's, Gaenslen's and AP compression test   Negative straight leg raise      Imaging  FL spine and pain procedure    (Results Pending)   MRI cervical spine without contrast    (Results Pending)         Orders Placed This Encounter   Procedures   • FL spine and pain procedure   • MRI cervical spine without contrast

## 2023-03-22 ENCOUNTER — HOSPITAL ENCOUNTER (OUTPATIENT)
Dept: NEUROLOGY | Facility: CLINIC | Age: 33
Discharge: HOME | End: 2023-03-22

## 2023-03-22 DIAGNOSIS — M54.40 LOW BACK PAIN WITH SCIATICA, SCIATICA LATERALITY UNSPECIFIED, UNSPECIFIED BACK PAIN LATERALITY, UNSPECIFIED CHRONICITY: ICD-10-CM

## 2023-03-22 DIAGNOSIS — G56.02 CARPAL TUNNEL SYNDROME ON LEFT: ICD-10-CM

## 2023-03-22 DIAGNOSIS — G56.01 CARPAL TUNNEL SYNDROME OF RIGHT WRIST: ICD-10-CM

## 2023-03-23 ENCOUNTER — OFFICE VISIT (OUTPATIENT)
Dept: PAIN MEDICINE | Facility: CLINIC | Age: 33
End: 2023-03-23

## 2023-03-23 VITALS
SYSTOLIC BLOOD PRESSURE: 104 MMHG | DIASTOLIC BLOOD PRESSURE: 73 MMHG | HEIGHT: 67 IN | BODY MASS INDEX: 29.82 KG/M2 | WEIGHT: 190 LBS | HEART RATE: 80 BPM

## 2023-03-23 DIAGNOSIS — F41.1 ANXIETY STATE: ICD-10-CM

## 2023-03-23 DIAGNOSIS — M54.2 NECK PAIN: ICD-10-CM

## 2023-03-23 DIAGNOSIS — M54.40 LOW BACK PAIN WITH SCIATICA, SCIATICA LATERALITY UNSPECIFIED, UNSPECIFIED BACK PAIN LATERALITY, UNSPECIFIED CHRONICITY: ICD-10-CM

## 2023-03-23 DIAGNOSIS — M46.1 SACROILIITIS (HCC): Primary | ICD-10-CM

## 2023-03-23 DIAGNOSIS — M79.18 MYOFASCIAL PAIN SYNDROME: ICD-10-CM

## 2023-03-23 DIAGNOSIS — M54.16 LUMBAR RADICULITIS: ICD-10-CM

## 2023-03-23 RX ORDER — MELOXICAM 15 MG/1
15 TABLET ORAL DAILY PRN
Qty: 30 TABLET | Refills: 1 | Status: SHIPPED | OUTPATIENT
Start: 2023-03-23

## 2023-03-23 RX ORDER — HYDROXYZINE HYDROCHLORIDE 25 MG/1
TABLET, FILM COATED ORAL
Qty: 30 TABLET | Refills: 0 | Status: SHIPPED | OUTPATIENT
Start: 2023-03-23

## 2023-03-23 RX ORDER — TIZANIDINE 2 MG/1
2 TABLET ORAL
Qty: 30 TABLET | Refills: 1 | Status: SHIPPED | OUTPATIENT
Start: 2023-03-23

## 2023-03-23 NOTE — PATIENT INSTRUCTIONS
Sacroiliac Joint Injection   WHAT YOU NEED TO KNOW:   What do I need to know about a sacroiliac joint injection? A sacroiliac (SI) joint injection is done to diagnose or treat pain from sacroiliac joint syndrome  The pain caused by this syndrome may be felt in your lower back, buttocks, groin, and your thigh  How do I prepare for an SI injection? Your healthcare provider will ask you to not take any pain medicine the day of the injection  Ask him or her if there are any other medicines you should not take  You will need to find someone to drive you home after your procedure  What will happen during the SI injection? You will be awake for your injection  You may be given calming medicine if you are anxious  You will lie on your stomach with a pillow under your abdomen  Your healthcare provider will give you an injection of medicine to numb the area  He or she may use an x-ray, ultrasound, or CT scan to find the area to inject  You may also be given an injection of contrast material to help your SI joint show up better  Then, your healthcare provider will inject local anesthesia, antiinflammatory medicine, or both into your SI joint  Healthcare providers will watch you closely for any problems for up to 30 minutes after your injection  Your healthcare provider will check to see if your pain decreases after the injection  What are the risks of an SI injection? You may have some weakness for a short time after your injection  The SI injection can cause bleeding, infection, and pain  It can also cause temporary weakness in your leg and problems urinating  You may have an allergic reaction to the medicine that is injected into your SI joint  Your pain may return and you may need more treatment  CARE AGREEMENT:   You have the right to help plan your care  Learn about your health condition and how it may be treated   Discuss treatment options with your healthcare providers to decide what care you want to receive  You always have the right to refuse treatment  The above information is an  only  It is not intended as medical advice for individual conditions or treatments  Talk to your doctor, nurse or pharmacist before following any medical regimen to see if it is safe and effective for you  © Copyright Sally Aguilar 2022 Information is for End User's use only and may not be sold, redistributed or otherwise used for commercial purposes

## 2023-03-31 ENCOUNTER — APPOINTMENT (OUTPATIENT)
Dept: PHYSICAL THERAPY | Facility: REHABILITATION | Age: 33
End: 2023-03-31

## 2023-04-11 PROBLEM — M79.672 BILATERAL FOOT PAIN: Status: ACTIVE | Noted: 2023-04-11

## 2023-04-11 PROBLEM — M79.671 BILATERAL FOOT PAIN: Status: ACTIVE | Noted: 2023-04-11

## 2023-04-28 ENCOUNTER — CONSULT (OUTPATIENT)
Dept: PULMONOLOGY | Facility: CLINIC | Age: 33
End: 2023-04-28

## 2023-04-28 VITALS
BODY MASS INDEX: 29.82 KG/M2 | RESPIRATION RATE: 18 BRPM | TEMPERATURE: 97.2 F | SYSTOLIC BLOOD PRESSURE: 130 MMHG | WEIGHT: 190 LBS | HEART RATE: 75 BPM | OXYGEN SATURATION: 99 % | DIASTOLIC BLOOD PRESSURE: 80 MMHG | HEIGHT: 67 IN

## 2023-04-28 DIAGNOSIS — D72.10 EOSINOPHILIA, UNSPECIFIED TYPE: ICD-10-CM

## 2023-04-28 DIAGNOSIS — R94.2 ABNORMAL PFT: ICD-10-CM

## 2023-04-28 DIAGNOSIS — R06.02 SHORTNESS OF BREATH: Primary | ICD-10-CM

## 2023-04-28 DIAGNOSIS — F17.210 CIGARETTE NICOTINE DEPENDENCE WITHOUT COMPLICATION: ICD-10-CM

## 2023-04-28 RX ORDER — FLUTICASONE PROPIONATE 250 UG/1
1 POWDER, METERED RESPIRATORY (INHALATION) 2 TIMES DAILY
Qty: 60 BLISTER | Refills: 2 | Status: SHIPPED | OUTPATIENT
Start: 2023-04-28 | End: 2023-05-28

## 2023-04-28 RX ORDER — VARENICLINE TARTRATE 25 MG
KIT ORAL
Qty: 53 EACH | Refills: 0 | Status: SHIPPED | OUTPATIENT
Start: 2023-04-28 | End: 2023-05-26

## 2023-04-28 RX ORDER — VARENICLINE TARTRATE 1 MG/1
1 TABLET, FILM COATED ORAL 2 TIMES DAILY
Qty: 120 TABLET | Refills: 0 | Status: SHIPPED | OUTPATIENT
Start: 2023-04-28 | End: 2023-06-27

## 2023-04-28 RX ORDER — ALBUTEROL SULFATE 90 UG/1
2 AEROSOL, METERED RESPIRATORY (INHALATION) EVERY 6 HOURS PRN
Qty: 18 G | Refills: 1 | Status: SHIPPED | OUTPATIENT
Start: 2023-04-28 | End: 2023-05-28

## 2023-04-28 NOTE — PATIENT INSTRUCTIONS
Start flovent 1puff twice daily, rinse mouth after use  Start albuterol 2puffs every 6 hours as needed for shortness of breath and 10 min prior to exercise  Start chantix starter pack and trial of nicotine patches and lozenges to completely quit smoking  Follow up in 3 months or sooner as needed

## 2023-04-28 NOTE — PROGRESS NOTES
Pulmonary Consultation   Donovan Segal 33 y o  female MRN: [id number]  4/28/2023      Reason for Consultation:  Shortness of breath    Requested by: NAHUM Lu    Assessment:  1  Shortness of breath  · Suspect related to weight gain, MSK issues, and likely underlying asthma  · Focus on further care plans as listed below    2  Suspected asthma  · Discussed findings with patient  · Will give trial of ICS with flovent 250mcg 1puff BID and prn PAULINA  · Based upon response consider further escalation of inhaled therapies, complete PFTs, NE RAST testing, etc  · Review vaccination status at next visit    3  Eosinophilia - as above    4  Nicotine dependence  · Discussed need for complete tobacco cessation  She is willing for attempts at complete cessation  Will plan for trial of Chantix and NRT  ADRs reviewed and advised to stop and call should these develop  · Total time counseling on tobacco cessation is 5 minutes  Plan:    Diagnoses and all orders for this visit:    Shortness of breath  -     Ambulatory referral to Pulmonology  -     POCT spirometry  -     POCT 6 minute walk  -     fluticasone (Flovent Diskus) 250 mcg/actuation diskus inhaler; Inhale 1 puff 2 (two) times a day Rinse mouth after use  -     albuterol (Ventolin HFA) 90 mcg/act inhaler; Inhale 2 puffs every 6 (six) hours as needed for wheezing or shortness of breath    Cigarette nicotine dependence without complication  -     Ambulatory referral to Pulmonology  -     varenicline 0 5 MG X 11 & 1 MG X 42 tablet therapy pack; Take 0 5 mg by mouth daily for 3 days, THEN 0 5 mg 2 (two) times a day for 4 days, THEN 1 mg 2 (two) times a day for 21 days  -     varenicline (CHANTIX) 1 mg tablet;  Take 1 tablet (1 mg total) by mouth 2 (two) times a day    Eosinophilia, unspecified type    Abnormal PFT        History of Present Illness   HPI:  Donovan Segal is a 33 y o  female who has has GERD/LPR and chronic right sided pain/back pain, and active tobacco abuse presents for evaluation of dyspnea  She reports she noted dyspnea after two episodes of COVID - first in Jan 2022 and second in Aug 2022  She reports she has both rest and exertional dyspnea and chest tightness  She denied chronic cough or sputum production  She notes difficulty with exercise and now does less at the gym and has fatigue  She denied wheeze, no pleurisy, no SSCP  She does have active reflux symptoms but felt PPI worsened or dyspnea  She notes 50lb weight gain  She reports she has had right sided pain and numbness (arm/legs/back) and has seen pain management, neurology and orthopedics without clear answers  She does report she feels her heart races at times  She attempts to go to gym regularly    She denied history of respiratory problems such as asthma, recurrent infections, ILD, etc     Review of Systems   Constitutional: Positive for activity change, fatigue and unexpected weight change  Negative for chills and fever  HENT: Positive for trouble swallowing  Negative for mouth sores, postnasal drip, rhinorrhea and sore throat  Respiratory: Positive for chest tightness and shortness of breath  Negative for cough and wheezing  Cardiovascular: Positive for palpitations  Negative for chest pain and leg swelling  Gastrointestinal: Negative for abdominal pain, nausea and vomiting  Endocrine: Positive for heat intolerance  Negative for cold intolerance  Musculoskeletal: Positive for arthralgias, back pain and myalgias  Allergic/Immunologic: Negative for immunocompromised state  Neurological: Positive for numbness  Negative for syncope, weakness, light-headedness and headaches  Hematological: Negative for adenopathy  Psychiatric/Behavioral: Negative for sleep disturbance  Historical Information   Past Medical History:   Diagnosis Date   • Acne    • Anxiety    • Depression    • Hypertension      History reviewed  No pertinent surgical history    Family History   Problem "Relation Age of Onset   • Cancer Father         non hodgkins lymphoma   • Cancer Paternal Grandmother    • Cancer Paternal Grandfather        Occupational History: previously worked in [employer], now [employer] work    Social History: active smoker 1/2ppd x 15 years, pet dog, no exotic animal eposures    Meds/Allergies     Current Outpatient Medications:   •  albuterol (Ventolin HFA) 90 mcg/act inhaler, Inhale 2 puffs every 6 (six) hours as needed for wheezing or shortness of breath, Disp: 18 g, Rfl: 1  •  fluticasone (Flovent Diskus) 250 mcg/actuation diskus inhaler, Inhale 1 puff 2 (two) times a day Rinse mouth after use , Disp: 60 blister, Rfl: 2  •  hydrOXYzine HCL (ATARAX) 25 mg tablet, TAKE 1 TABLET BY MOUTH EVERY 8 HOURS AS NEEDED FOR ANXIETY, Disp: 30 tablet, Rfl: 0  •  varenicline (CHANTIX) 1 mg tablet, Take 1 tablet (1 mg total) by mouth 2 (two) times a day, Disp: 120 tablet, Rfl: 0  •  varenicline 0 5 MG X 11 & 1 MG X 42 tablet therapy pack, Take 0 5 mg by mouth daily for 3 days, THEN 0 5 mg 2 (two) times a day for 4 days, THEN 1 mg 2 (two) times a day for 21 days  , Disp: 53 each, Rfl: 0  •  meloxicam (MOBIC) 15 mg tablet, Take 1 tablet (15 mg total) by mouth daily as needed for moderate pain (Patient not taking: Reported on 4/11/2023), Disp: 30 tablet, Rfl: 1  •  tiZANidine (ZANAFLEX) 2 mg tablet, Take 1 tablet (2 mg total) by mouth daily at bedtime as needed for muscle spasms (Patient not taking: Reported on 4/11/2023), Disp: 30 tablet, Rfl: 1  Allergies   Allergen Reactions   • No Known Allergies        Vitals: Blood pressure 130/80, pulse 75, temperature (!) 97 2 °F (36 2 °C), temperature source Tympanic, resp  rate 18, height 5' 7\" (1 702 m), weight 86 2 kg (190 lb), last menstrual period 04/06/2023, SpO2 99 %  , Body mass index is 29 76 kg/m²  Oxygen Therapy  SpO2: 99 %  Oxygen Therapy: None (Room air)    Physical Exam  Physical Exam  Vitals reviewed     Constitutional:       General: She " is not in acute distress  Appearance: Normal appearance  She is well-developed and normal weight  She is not ill-appearing, toxic-appearing or diaphoretic  HENT:      Head: Normocephalic and atraumatic  Right Ear: External ear normal       Left Ear: External ear normal       Nose: Nose normal       Mouth/Throat:      Mouth: Mucous membranes are moist       Pharynx: No oropharyngeal exudate  Comments: Mild posterior pharyngeal cobblestoning  Eyes:      General: No scleral icterus  Right eye: No discharge  Left eye: No discharge  Conjunctiva/sclera: Conjunctivae normal       Pupils: Pupils are equal, round, and reactive to light  Neck:      Vascular: No JVD  Trachea: No tracheal deviation  Cardiovascular:      Rate and Rhythm: Normal rate and regular rhythm  Heart sounds: Normal heart sounds  No murmur heard  No gallop  Pulmonary:      Effort: Pulmonary effort is normal  No respiratory distress  Breath sounds: Normal breath sounds  No stridor  No wheezing, rhonchi or rales  Abdominal:      General: Bowel sounds are normal  There is no distension  Palpations: Abdomen is soft  Tenderness: There is no abdominal tenderness  There is no guarding or rebound  Musculoskeletal:         General: No deformity  Right lower leg: No edema  Left lower leg: No edema  Lymphadenopathy:      Cervical: No cervical adenopathy  Skin:     General: Skin is warm and dry  Coloration: Skin is not jaundiced  Findings: No erythema or rash  Neurological:      General: No focal deficit present  Mental Status: She is alert and oriented to person, place, and time  Mental status is at baseline  Psychiatric:         Mood and Affect: Mood normal          Behavior: Behavior normal          Thought Content: Thought content normal          Labs: I have personally reviewed pertinent lab results    Lab Results   Component Value Date    WBC 4 85 10/24/2022    HGB 13 7 10/24/2022    HCT 42 4 10/24/2022    MCV 92 10/24/2022     10/24/2022     Lab Results   Component Value Date    CALCIUM 8 9 10/24/2022    K 4 4 10/24/2022    CO2 24 10/24/2022     (H) 10/24/2022    BUN 13 10/24/2022    CREATININE 0 71 10/24/2022     No results found for: IGE  Lab Results   Component Value Date    ALT 28 10/24/2022    AST 11 10/24/2022    ALKPHOS 38 (L) 10/24/2022       Abs Eos 3/9/2022 - 320 - 7%    Imaging and other studies: I have personally reviewed pertinent reports  and I have personally reviewed pertinent films in PACS  CXR 2022 - no focal infiltrates, no effusions, no PTX    Pulmonary function testin2023 - Ratio 73%, FVC 4 27L (103%), FEV1 3 14L (91%) - mild obstructive airflow defect, likely under estimate given short expiratory time of 3 4 seconds     2023 - 6MWT on RA - total walk distance 252 meters, initial SpO2 99%, valery 98%, conclusion 98%, maximal HR 105bpm    EKG, Pathology, and Other Studies:   None available     Lex Marie DO, [facility]

## 2023-05-01 ENCOUNTER — TELEPHONE (OUTPATIENT)
Dept: PAIN MEDICINE | Facility: CLINIC | Age: 33
End: 2023-05-01

## 2023-05-01 ENCOUNTER — TELEPHONE (OUTPATIENT)
Dept: GASTROENTEROLOGY | Facility: CLINIC | Age: 33
End: 2023-05-01

## 2023-05-01 NOTE — TELEPHONE ENCOUNTER
Caller: patient     Doctor: CINTHYA Frederick    Reason for call: pt transferred to procedure     Call back#: n/a

## 2023-05-16 ENCOUNTER — OFFICE VISIT (OUTPATIENT)
Dept: OBGYN CLINIC | Facility: CLINIC | Age: 33
End: 2023-05-16

## 2023-05-16 VITALS
BODY MASS INDEX: 29.82 KG/M2 | HEIGHT: 67 IN | DIASTOLIC BLOOD PRESSURE: 74 MMHG | WEIGHT: 190 LBS | HEART RATE: 76 BPM | SYSTOLIC BLOOD PRESSURE: 115 MMHG

## 2023-05-16 DIAGNOSIS — G56.01 CARPAL TUNNEL SYNDROME OF RIGHT WRIST: Primary | ICD-10-CM

## 2023-05-16 RX ORDER — BETAMETHASONE SODIUM PHOSPHATE AND BETAMETHASONE ACETATE 3; 3 MG/ML; MG/ML
6 INJECTION, SUSPENSION INTRA-ARTICULAR; INTRALESIONAL; INTRAMUSCULAR; SOFT TISSUE
Status: COMPLETED | OUTPATIENT
Start: 2023-05-16 | End: 2023-05-16

## 2023-05-16 RX ORDER — LIDOCAINE HYDROCHLORIDE 10 MG/ML
2 INJECTION, SOLUTION INFILTRATION; PERINEURAL
Status: COMPLETED | OUTPATIENT
Start: 2023-05-16 | End: 2023-05-16

## 2023-05-16 RX ADMIN — LIDOCAINE HYDROCHLORIDE 2 ML: 10 INJECTION, SOLUTION INFILTRATION; PERINEURAL at 13:14

## 2023-05-16 RX ADMIN — BETAMETHASONE SODIUM PHOSPHATE AND BETAMETHASONE ACETATE 6 MG: 3; 3 INJECTION, SUSPENSION INTRA-ARTICULAR; INTRALESIONAL; INTRAMUSCULAR; SOFT TISSUE at 13:14

## 2023-05-16 NOTE — PROGRESS NOTES
Assessment:    Right hand numbness / tingling  Normal EMG B/L UE      Plan:    Steroid injection into right carpal tunnel provided today for diagnostic and therapeutic purposes  She tolerated well  Follow-up 4 weeks for re-evaluation                  Subjective:     HPI    Patient ID:  Puja Seals is a right hand dominant 33 y o  female presenting for office follow-up  Last seen by Dr Robby Mendiola in March 2023 and referred for EMG study  She is here for follow-up  Today, she states overall her symptoms are slowly worsening  She states it is only her right hand  She has shooting pain from the forearm to the volar wrist into the fingers with associated intermittent numbness and tingling  She feels like she is losing her  strength as well  She is scheduled for MRI cervical spine in the near future  The following portions of the patient's history were reviewed and updated as appropriate: allergies, current medications, past family history, past medical history, past social history, past surgical history, and problem list     Review of Systems     Objective:    Imaging:  EMG B/L UE 4/28/2023  Impression:   Normal study  These electrodiagnostic findings do not provide any evidence to support a generalized neurogenic or myopathic process affecting the peripheral nervous system  In addition, there is no evidence to support a focal neuropathy versus radiculopathy in either upper or left lower extremity  Compared to prior study in September 2021, there has been interval improvement in the median sensory responses, the study today did not meet the electrodiagnostic criteria for carpal tunnel syndrome  Physical Exam   Vitals:    05/16/23 1247   BP: 115/74   Pulse: 76       General appearance:  NAD   Cardiac:  Regular rate  Lungs:  Unlabored breathing  Abdomen:  Non-distended    Orthopedic Examination:  Right hand     Inspection: No open wound or erythema  No ecchymosis or swelling    No thenar or hypothenar, or dorsal interossei wasting  Palpation: Nontender to palpation bony prominences of the hand and wrist   First dorsal extensor compartment nontender  Range-of-motion: Normal wrist range of motion, full composite fist formation    Strength: 5/5 wrist flexion extension, , intrinsics, thumb opposition, APB    Sensation: Intact to light touch median radial ulnar nerve distribution    Special Tests: + Tinel's and Durkan's right wrist    Hand/upper extremity injection: R carpal tunnel  [place] Protocol:  Consent: Verbal consent obtained    Risks and benefits: risks, benefits and alternatives were discussed  Consent given by: patient  Patient identity confirmed: verbally with patient    Supporting Documentation  Indications: pain, diagnostic and therapeutic   Procedure Details  Condition:carpal tunnel syndrome Site: R carpal tunnel   Preparation: Patient was prepped and draped in the usual sterile fashion  Needle size: 22 G  Approach: volar  Medications administered: 2 mL lidocaine 1 %; 6 mg betamethasone acetate-betamethasone sodium phosphate 6 (3-3) mg/mL    Patient tolerance: patient tolerated the procedure well with no immediate complications  Dressing:  Sterile dressing applied

## 2023-05-22 ENCOUNTER — OFFICE VISIT (OUTPATIENT)
Dept: PODIATRY | Facility: CLINIC | Age: 33
End: 2023-05-22

## 2023-05-22 VITALS
HEIGHT: 67 IN | HEART RATE: 99 BPM | SYSTOLIC BLOOD PRESSURE: 135 MMHG | WEIGHT: 190 LBS | BODY MASS INDEX: 29.82 KG/M2 | DIASTOLIC BLOOD PRESSURE: 85 MMHG

## 2023-05-22 DIAGNOSIS — M72.2 PLANTAR FASCIITIS OF RIGHT FOOT: ICD-10-CM

## 2023-05-22 NOTE — PATIENT INSTRUCTIONS
Plantar Fasciitis Exercises   WHAT YOU NEED TO KNOW:   Plantar fasciitis exercises help stretch your plantar fascia, calf muscles, and Achilles tendon  They also help strengthen the muscles that support your heel and foot  Exercises and stretching can help prevent plantar fasciitis from getting worse or coming back  DISCHARGE INSTRUCTIONS:   Call your doctor or physical therapist if:   Your pain and swelling increase  You develop new knee, hip, or back pain  You have questions or concerns about your condition or care  How to do plantar fasciitis exercises:  Ask your healthcare provider when to start these exercises and how often to do them  Slant board stretch:  Stand on a slanted board with your toes higher than your heel  Press your heel into the board  Keep your knee slightly bent  Hold this position for 1 minute  Repeat 5 times  Heel stretch:  Stand up straight with your hands on a wall  Place your injured leg slightly behind your other leg  Keep your heels flat on the floor, lean forward, and bend both knees  Hold for 30 seconds  Calf stretch:  Stand up straight with your hands on a wall  Step forward so that your uninjured foot is in front of your injured foot  Keep your front leg bent and your back leg straight  Gently lean forward until you feel your calf stretch  Hold for 30 seconds and then relax  Seated plantar fascia stretch:  Sit on a firm surface, such as the floor or a mat  Extend your legs out in front of you  Raise your injured foot a few inches off the ground  Keep your leg straight  Grab the toes of your injured foot and pull them toward you  With your other hand, feel your plantar fascia  You should feel it press outward  Hold for 30 seconds  If you cannot reach your toes, loop a towel or tie around your foot  Gently pull on the towel or tie and flex your toes toward you  Heel raises:  Stand on the injured leg  Raise your other leg off the ground  Hold onto a railing or wall for balance  Slowly rise up on the toes of your injured leg  Hold for 5 seconds  Slowly lower your heel to the ground  Toe curls:  Place a towel on the floor  Put your foot flat on the towel  Grab the towel with your toes by curling them around the towel  Lift the towel up with your toes  Toe taps:  Sit down and place your foot flat on the floor  Keep your heel on the floor  Point all your toes up toward the ceiling  While the 4 smaller toes are pointed up, bend your big toe down and tap it on the ground  Do 10 to 50 taps  Point all 5 toes up toward the ceiling again  This time keep your big toe pointed up and tap the 4 smaller toes on the ground  Do 10 to 50 taps each time  Follow up with your doctor or physical therapist as directed:  Write down your questions so you remember to ask them during your visits  © Copyright Cynthia Lopez 2022 Information is for End User's use only and may not be sold, redistributed or otherwise used for commercial purposes  The above information is an  only  It is not intended as medical advice for individual conditions or treatments  Talk to your doctor, nurse or pharmacist before following any medical regimen to see if it is safe and effective for you

## 2023-05-22 NOTE — PROGRESS NOTES
"Assessment/Plan:         Diagnoses and all orders for this visit:    Plantar fasciitis of right foot  -     Ambulatory Referral to Podiatry    Diagnosis and options discussed with patient  Patient agreeable to the plan as stated below    1  Discussed diagnosis and treatment options  2  Provided home therapy and stretching exercises  3  Deferred injections today, but did discuss the possibility  4  Stressed compliance with home/formal PT and arch supports  She is getting a steroid injection in her back Wednesday, held oral prednisone today  Stressed need for daily therapy 3 times per day  Subjective:      Patient ID: Eduardo Villa is a 33 y o  female  Patient presents with foot pain  Her right arch and heel hurts when walking  Pain begins when first walking around  She denies trauma  IT has been getting worse for a few months  She tried inserts Dr Mirza Anis inserts but she rarely wears them  When she does they do feel better cushioned  She smokes 1/2ppd  The following portions of the patient's history were reviewed and updated as appropriate: allergies, current medications, past family history, past medical history, past social history, past surgical history and problem list     Review of Systems    Constitutional: Negative  Respiratory: Negative for cough and shortness of breath  Gastrointestinal: Negative for diarrhea, nausea and vomiting  Musculoskeletal: Negative for arthralgias, gait problem, joint swelling and myalgias  Skin: Negative for rash or wound  Neurological: Negative for weakness, numbness and headaches  Objective:      /85   Pulse 99   Ht 5' 7\" (1 702 m)   Wt 86 2 kg (190 lb)   BMI 29 76 kg/m²          Physical Exam    Vitals reviewed    Constitutional: Patient is not distressed  Patient is well developed    Vascular: Dorsalis pedis and posterior tibial pulses 2/4  Capillary refill time within normal limits to all digits  No erythema   No " edema  Dermatology: No rash, no open lesions  Present pedal hair  Musculoskeletal: Normal range of motion to subtalar joint, and midtarsal joint  Normal range of motion first MTPJ  Manual muscle testing 5 out of 5 for inversion/eversion/dorsiflexion/plantarflexion  Pain on medial band insertion of plantar fascia on the right foot    Plantar fascia is taut but intact in central arch  No fibroma palpated    Patient demonstrates moderate ankle equinus     On stance, patient shows pes planus    Neurological exam: Monofilament sensation intact  Vibratory sensation intact   Achilles reflex is normal

## 2023-05-31 ENCOUNTER — HOSPITAL ENCOUNTER (OUTPATIENT)
Dept: RADIOLOGY | Facility: CLINIC | Age: 33
Discharge: HOME | End: 2023-05-31
Payer: MEDICARE

## 2023-05-31 VITALS
HEART RATE: 77 BPM | DIASTOLIC BLOOD PRESSURE: 82 MMHG | SYSTOLIC BLOOD PRESSURE: 116 MMHG | TEMPERATURE: 97.7 F | OXYGEN SATURATION: 100 % | RESPIRATION RATE: 18 BRPM

## 2023-05-31 DIAGNOSIS — M46.1 SACROILIITIS (HCC): ICD-10-CM

## 2023-05-31 PROCEDURE — 27096 INJECT SACROILIAC JOINT: CPT | Performed by: ANESTHESIOLOGY

## 2023-05-31 RX ORDER — METHYLPREDNISOLONE ACETATE 40 MG/ML
40 INJECTION, SUSPENSION INTRA-ARTICULAR; INTRALESIONAL; INTRAMUSCULAR; PARENTERAL; SOFT TISSUE ONCE
Status: COMPLETED | OUTPATIENT
Start: 2023-05-31 | End: 2023-05-31

## 2023-05-31 RX ORDER — BUPIVACAINE HCL/PF 2.5 MG/ML
1.5 VIAL (ML) INJECTION ONCE
Status: COMPLETED | OUTPATIENT
Start: 2023-05-31 | End: 2023-05-31

## 2023-05-31 RX ORDER — LIDOCAINE HYDROCHLORIDE 10 MG/ML
3 INJECTION, SOLUTION EPIDURAL; INFILTRATION; INTRACAUDAL; PERINEURAL ONCE
Status: COMPLETED | OUTPATIENT
Start: 2023-05-31 | End: 2023-05-31

## 2023-05-31 RX ADMIN — IOHEXOL 0.5 ML: 300 INJECTION, SOLUTION INTRAVENOUS at 11:38

## 2023-05-31 RX ADMIN — BUPIVACAINE HYDROCHLORIDE 1.5 ML: 2.5 INJECTION, SOLUTION EPIDURAL; INFILTRATION; INTRACAUDAL at 11:38

## 2023-05-31 RX ADMIN — LIDOCAINE HYDROCHLORIDE 3 ML: 10 INJECTION, SOLUTION EPIDURAL; INFILTRATION; INTRACAUDAL; PERINEURAL at 11:37

## 2023-05-31 RX ADMIN — METHYLPREDNISOLONE ACETATE 40 MG: 40 INJECTION, SUSPENSION INTRA-ARTICULAR; INTRALESIONAL; INTRAMUSCULAR; SOFT TISSUE at 11:38

## 2023-05-31 NOTE — H&P
History of Present Illness: The patient is a 33 y o  female who presents with complaints of left-sided low back and buttock pain  Past Medical History:   Diagnosis Date   • Acne    • Anxiety    • Depression    • Hypertension        History reviewed  No pertinent surgical history  Current Outpatient Medications:   •  fluticasone (Flovent Diskus) 250 mcg/actuation diskus inhaler, Inhale 1 puff 2 (two) times a day Rinse mouth after use , Disp: 60 blister, Rfl: 2  •  hydrOXYzine HCL (ATARAX) 25 mg tablet, TAKE 1 TABLET BY MOUTH EVERY 8 HOURS AS NEEDED FOR ANXIETY, Disp: 90 tablet, Rfl: 0  •  meloxicam (MOBIC) 15 mg tablet, Take 1 tablet (15 mg total) by mouth daily as needed for moderate pain (Patient not taking: Reported on 4/11/2023), Disp: 30 tablet, Rfl: 1  •  tiZANidine (ZANAFLEX) 2 mg tablet, Take 1 tablet (2 mg total) by mouth daily at bedtime as needed for muscle spasms (Patient not taking: Reported on 4/11/2023), Disp: 30 tablet, Rfl: 1  •  varenicline (CHANTIX) 1 mg tablet, Take 1 tablet (1 mg total) by mouth 2 (two) times a day, Disp: 120 tablet, Rfl: 0  •  varenicline 0 5 MG X 11 & 1 MG X 42 tablet therapy pack, Take 0 5 mg by mouth daily for 3 days, THEN 0 5 mg 2 (two) times a day for 4 days, THEN 1 mg 2 (two) times a day for 21 days  , Disp: 53 each, Rfl: 0    Allergies   Allergen Reactions   • No Known Allergies        Physical Exam:   Vitals:    05/31/23 1125   BP: 112/78   Pulse: 91   Resp: 18   Temp: 97 7 °F (36 5 °C)   SpO2: 100%     General: Awake, Alert, Oriented x 3, Mood and affect appropriate  Respiratory: Respirations even and unlabored  Cardiovascular: Peripheral pulses intact; no edema  Musculoskeletal Exam: Tenderness palpation over left SI joint    ASA Score: 2         Assessment: Sacroiliitis    Plan: Left SIJ injection

## 2023-05-31 NOTE — DISCHARGE INSTRUCTIONS
Steroid Joint Injection   WHAT YOU NEED TO KNOW:   A steroid joint injection is a procedure to inject steroid medicine into a joint  Steroid medicine decreases pain and inflammation  The injection may also contain an anesthetic (numbing medicine) to decrease pain  It may be done to treat conditions such as arthritis, gout, or carpal tunnel syndrome  The injections may be given in your knee, ankle, shoulder, elbow, wrist, ankle or sacroiliac joint  Do not apply heat to any area that is numb  If you have discomfort or soreness at the injection site, you may apply ice today, 20 minutes on and 20 minutes off  Tomorrow you may use ice or warm, moist heat  Do not apply ice or heat directly to the skin  You may have an increase or change in the discomfort for 36-48 hours after your treatment  Apply ice and continue with any pain medicine you have been prescribed  Do not do anything strenuous today  You may shower, but no tub baths or hot tubs today  You may resume your normal activities tomorrow, but do not “overdo it”  Resume normal activities slowly when you are feeling better  If you experience redness, drainage or swelling at the injection site, or if you develop a fever above 100 degrees, please call [facility] at [phone] or go to the Emergency Room  Continue to take all routine medicines prescribed by your primary care physician unless otherwise instructed by our staff  Most blood thinners should be started again according to your regularly scheduled dosing  If you have any questions, please give our office a call  As no general anesthesia was used in today's procedure, you should not experience any side effects related to anesthesia  If you are diabetic, the steroids used in today's injection may temporarily increase your blood sugar levels after the first few days after your injection   Please keep a close eye on your sugars and alert the doctor who manages your diabetes if your sugars are significantly high from your baseline or you are symptomatic  If you have a problem specifically related to your procedure, please call our office at [phone]  Problems not related to your procedure should be directed to your primary care physician

## 2023-06-07 ENCOUNTER — TELEPHONE (OUTPATIENT)
Dept: PAIN MEDICINE | Facility: CLINIC | Age: 33
End: 2023-06-07

## 2023-06-07 NOTE — TELEPHONE ENCOUNTER
Patient Reports    50     %     improvement post injection    Pain Level  5   /10  Starting to experience pain in right knee

## 2023-07-02 ENCOUNTER — ANESTHESIA (OUTPATIENT)
Dept: ANESTHESIOLOGY | Facility: HOSPITAL | Age: 33
End: 2023-07-02

## 2023-07-02 ENCOUNTER — ANESTHESIA EVENT (OUTPATIENT)
Dept: ANESTHESIOLOGY | Facility: HOSPITAL | Age: 33
End: 2023-07-02

## 2023-07-02 NOTE — ANESTHESIA PREPROCEDURE EVALUATION
Procedure:  PRE-OP ONLY    Relevant Problems   CARDIO   (+) Migraine without aura, with intractable migraine, so stated, with status migrainosus   (+) Musculoskeletal chest pain      GI/HEPATIC   (+) Gastroesophageal reflux disease      MUSCULOSKELETAL   (+) Low back pain with sciatica   (+) Sacroiliitis (HCC)      NEURO/PSYCH   (+) Anxiety state   (+) Chronic headaches   (+) Migraine without aura, with intractable migraine, so stated, with status migrainosus   (+) Numbness and tingling in right hand   (+) Paresthesias   (+) Right leg paresthesias      PULMONARY   (+) Shortness of breath        Physical Exam    Airway    Mallampati score: II  TM Distance: >3 FB  Neck ROM: full     Dental   No notable dental hx     Cardiovascular  Cardiovascular exam normal    Pulmonary  Pulmonary exam normal     Other Findings        Anesthesia Plan  ASA Score- 2     Anesthesia Type- IV sedation with anesthesia with ASA Monitors. Additional Monitors:   Airway Plan:           Plan Factors-Exercise tolerance (METS): >4 METS. Chart reviewed. EKG reviewed. Imaging results reviewed. Existing labs reviewed. Patient summary reviewed. Patient is not a current smoker. Patient not instructed to abstain from smoking on day of procedure. Patient did not smoke on day of surgery. Induction- intravenous. Postoperative Plan-     Informed Consent- Anesthetic plan and risks discussed with patient. I personally reviewed this patient with the CRNA. Discussed and agreed on the Anesthesia Plan with the CRNA. Raina Acosta

## 2023-07-03 RX ORDER — SODIUM CHLORIDE, SODIUM LACTATE, POTASSIUM CHLORIDE, CALCIUM CHLORIDE 600; 310; 30; 20 MG/100ML; MG/100ML; MG/100ML; MG/100ML
125 INJECTION, SOLUTION INTRAVENOUS CONTINUOUS
OUTPATIENT
Start: 2023-07-03

## 2023-09-11 ENCOUNTER — OFFICE VISIT (OUTPATIENT)
Dept: FAMILY MEDICINE CLINIC | Facility: CLINIC | Age: 33
End: 2023-09-11
Payer: MEDICARE

## 2023-09-11 VITALS
HEIGHT: 67 IN | SYSTOLIC BLOOD PRESSURE: 120 MMHG | HEART RATE: 85 BPM | TEMPERATURE: 97.3 F | BODY MASS INDEX: 28.72 KG/M2 | OXYGEN SATURATION: 98 % | WEIGHT: 183 LBS | RESPIRATION RATE: 16 BRPM | DIASTOLIC BLOOD PRESSURE: 78 MMHG

## 2023-09-11 DIAGNOSIS — Z12.4 CERVICAL CANCER SCREENING: ICD-10-CM

## 2023-09-11 DIAGNOSIS — R29.898 WEAKNESS OF RIGHT ARM: ICD-10-CM

## 2023-09-11 DIAGNOSIS — R29.898 WEAKNESS OF RIGHT LEG: ICD-10-CM

## 2023-09-11 DIAGNOSIS — M54.2 NECK PAIN: ICD-10-CM

## 2023-09-11 DIAGNOSIS — F17.210 CIGARETTE NICOTINE DEPENDENCE WITHOUT COMPLICATION: ICD-10-CM

## 2023-09-11 DIAGNOSIS — M54.40 LOW BACK PAIN WITH SCIATICA, SCIATICA LATERALITY UNSPECIFIED, UNSPECIFIED BACK PAIN LATERALITY, UNSPECIFIED CHRONICITY: ICD-10-CM

## 2023-09-11 DIAGNOSIS — Z00.00 WELL ADULT EXAM: Primary | ICD-10-CM

## 2023-09-11 DIAGNOSIS — R20.2 PARESTHESIAS: ICD-10-CM

## 2023-09-11 PROCEDURE — 99395 PREV VISIT EST AGE 18-39: CPT | Performed by: PHYSICIAN ASSISTANT

## 2023-09-11 PROCEDURE — 99214 OFFICE O/P EST MOD 30 MIN: CPT | Performed by: PHYSICIAN ASSISTANT

## 2023-09-11 PROCEDURE — 99406 BEHAV CHNG SMOKING 3-10 MIN: CPT | Performed by: PHYSICIAN ASSISTANT

## 2023-09-11 RX ORDER — ALBUTEROL SULFATE 90 UG/1
2 AEROSOL, METERED RESPIRATORY (INHALATION) EVERY 6 HOURS PRN
COMMUNITY

## 2023-09-11 NOTE — PROGRESS NOTES
Name: Marlen Decker      : 1990      MRN: [id number]  Encounter Provider: Juhi Walsh PA-C  Encounter Date: 2023   Encounter department: [address] [place] [address]     1. Well adult exam    2. BMI 28.0-28.9,adult    3. Cervical cancer screening  -     Ambulatory Referral to Gynecology; Future    4. Paresthesias  -     Ambulatory Referral to Neurology; Future    5. Low back pain with sciatica, sciatica laterality unspecified, unspecified back pain laterality, unspecified chronicity  -     Ambulatory Referral to Physical Therapy; Future    6. Neck pain  -     Ambulatory Referral to Physical Therapy; Future    7. Weakness of right arm  -     Ambulatory Referral to Physical Therapy; Future  -     Ambulatory Referral to Neurology; Future    8. Weakness of right leg  -     Ambulatory Referral to Physical Therapy; Future  -     Ambulatory Referral to Neurology; Future    9. Cigarette nicotine dependence without complication      Annual physical has been done as a separate visit today    She did have an MRI of her brain in  for paresthesias which was normal  - EMG results of both upper extremities and lower extremities from 3/14/2023 has been reviewed and is normal    -I did advise there are muscles changes we get older. I also advised her that I did not notice any concerning abnormalities of her facial muscles. She states that she notices more fullness on the left side compared to the right side especially when she smiles. I did advise her to ask the physical therapist for facial exercises. She states that she is also going to consider Botox. - Refer to PT for right-sided weakness, neck pain, low back pain. Importance of doing exercises regularly at home has also been discussed  - Refer to neurology again  - I did recommend follow-up in 6 months, sooner if needed    M*[employer] software was used to dictate this note.  It may contain errors with dictating incorrect words/spelling. Please contact provider directly for any questions. Subjective      Patient presents today with continued concerns of weakness of her right side. She is also noticed that her facial muscles are different from one side to the other. She recently went to physical therapy for her neck pain. She states that she continues with exercises at home. She does have intermittent low back pain. She does not continue follow-up with neurology at this time. She is concerned about the paresthesias on her right side which include tingling and numbness. She had an MRI of her brain in 2021 which was normal for paresthesias. She states that she is wondering if she is having this weakness because she works at [employer] at [employer] and primarily utilizes her left upper extremity. Review of Systems   Constitutional: Negative. Musculoskeletal:        As stated in HPI   Neurological:        As stated in HPI       Current Outpatient Medications on File Prior to Visit   Medication Sig   • albuterol (PROVENTIL HFA,VENTOLIN HFA) 90 mcg/act inhaler Inhale 2 puffs every 6 (six) hours as needed   • fluticasone (Flovent Diskus) 250 mcg/actuation diskus inhaler Inhale 1 puff 2 (two) times a day Rinse mouth after use. • hydrOXYzine HCL (ATARAX) 25 mg tablet TAKE 1 TABLET BY MOUTH EVERY 8 HOURS AS NEEDED FOR ANXIETY   • meloxicam (MOBIC) 15 mg tablet Take 1 tablet (15 mg total) by mouth daily as needed for moderate pain (Patient not taking: Reported on 4/11/2023)   • tiZANidine (ZANAFLEX) 2 mg tablet Take 1 tablet (2 mg total) by mouth daily at bedtime as needed for muscle spasms (Patient not taking: Reported on 4/11/2023)   • varenicline (CHANTIX) 1 mg tablet Take 1 tablet (1 mg total) by mouth 2 (two) times a day   • varenicline 0.5 MG X 11 & 1 MG X 42 tablet therapy pack Take 0.5 mg by mouth daily for 3 days, THEN 0.5 mg 2 (two) times a day for 4 days, THEN 1 mg 2 (two) times a day for 21 days. Objective     /78 (BP Location: Left arm, Patient Position: Sitting, Cuff Size: Standard)   Pulse 85   Temp (!) 97.3 °F (36.3 °C) (Tympanic)   Resp 16   Ht 5' 7" (1.702 m)   Wt 83 kg (183 lb)   SpO2 98%   BMI 28.66 kg/m²     Physical Exam  Vitals reviewed. Constitutional:       General: She is not in acute distress. Appearance: Normal appearance. She is well-developed. She is not ill-appearing, toxic-appearing or diaphoretic. HENT:      Head: Normocephalic and atraumatic. Neck:      Thyroid: No thyromegaly. Cardiovascular:      Rate and Rhythm: Normal rate and regular rhythm. Heart sounds: Normal heart sounds. No murmur heard. No friction rub. No gallop. Pulmonary:      Effort: Pulmonary effort is normal. No respiratory distress. Breath sounds: Normal breath sounds. No wheezing, rhonchi or rales. Abdominal:      General: Bowel sounds are normal.      Palpations: Abdomen is soft. There is no mass. Tenderness: There is no abdominal tenderness. Musculoskeletal:         General: No deformity. Cervical back: Neck supple. Right lower leg: No edema. Left lower leg: No edema. Lymphadenopathy:      Cervical: No cervical adenopathy. Skin:     General: Skin is warm. Neurological:      General: No focal deficit present. Mental Status: She is alert. Psychiatric:         Mood and Affect: Mood normal.         Behavior: Behavior normal.         Thought Content:  Thought content normal.         Judgment: Judgment normal.       Christy Lu PA-C

## 2023-09-11 NOTE — PROGRESS NOTES
Name: Mary Hurley      : 1990      MRN: [id number]  Encounter Provider: Shanika Dillon PA-C  Encounter Date: 2023   Encounter department: [address] [place] [address]     1. Well adult exam    2. BMI 28.0-28.9,adult    3. Cigarette nicotine dependence without complication    -I did refer her to gynecology for her screening Pap smear. Advised on importance of doing this  -She has successfully lost 6 pounds over the last several months because of changing diet and exercising.  - Routine physical in 1 year    M*[employer] software was used to dictate this note. It may contain errors with dictating incorrect words/spelling. Please contact provider directly for any questions. BMI Counseling: Body mass index is 28.66 kg/m². The BMI is above normal. Nutrition recommendations include reducing portion sizes and moderation in carbohydrate intake. Exercise recommendations include exercising 3-5 times per week. Tobacco Cessation Counseling: Tobacco cessation counseling and education was provided. The patient is sincerely urged to quit consumption of tobacco. She is not ready to quit tobacco. The numerous health risks of tobacco consumption were discussed. She smokes less than 1/2 pack/day. This was discussed for about 3 minutes. Subjective      Patient presents today for her annual physical.  Her other concerns will be addressed as a separate visit. She does see the dentist every 6 months  Denies any vision or hearing problems  She states that her diet has been much healthier. She has reduced her carb intake. She did not realize that she lost weight. She has increased her exercise. She does walk at least once or twice daily. She is also been going to the gym twice a week  She continues to smoke cigarettes less than 1/2 pack/day.   She is not ready to quit at this time  Denies any alcohol, drug use or vaping  She is unsure of her last Pap smear.    Review of Systems    Current Outpatient Medications on File Prior to Visit   Medication Sig   • hydrOXYzine HCL (ATARAX) 25 mg tablet TAKE 1 TABLET BY MOUTH EVERY 8 HOURS AS NEEDED FOR ANXIETY   • fluticasone (Flovent Diskus) 250 mcg/actuation diskus inhaler Inhale 1 puff 2 (two) times a day Rinse mouth after use. • meloxicam (MOBIC) 15 mg tablet Take 1 tablet (15 mg total) by mouth daily as needed for moderate pain (Patient not taking: Reported on 4/11/2023)   • tiZANidine (ZANAFLEX) 2 mg tablet Take 1 tablet (2 mg total) by mouth daily at bedtime as needed for muscle spasms (Patient not taking: Reported on 4/11/2023)   • varenicline (CHANTIX) 1 mg tablet Take 1 tablet (1 mg total) by mouth 2 (two) times a day   • varenicline 0.5 MG X 11 & 1 MG X 42 tablet therapy pack Take 0.5 mg by mouth daily for 3 days, THEN 0.5 mg 2 (two) times a day for 4 days, THEN 1 mg 2 (two) times a day for 21 days. Objective     /78 (BP Location: Left arm, Patient Position: Sitting, Cuff Size: Standard)   Pulse 85   Temp (!) 97.3 °F (36.3 °C) (Tympanic)   Resp 16   Ht 5' 7" (1.702 m)   Wt 83 kg (183 lb)   SpO2 98%   BMI 28.66 kg/m²     Physical Exam  Vitals reviewed. Constitutional:       General: She is not in acute distress. Appearance: Normal appearance. She is well-developed. She is not ill-appearing, toxic-appearing or diaphoretic. HENT:      Head: Normocephalic and atraumatic. Neck:      Thyroid: No thyromegaly. Cardiovascular:      Rate and Rhythm: Normal rate and regular rhythm. Heart sounds: Normal heart sounds. No murmur heard. No friction rub. No gallop. Pulmonary:      Effort: Pulmonary effort is normal. No respiratory distress. Breath sounds: Normal breath sounds. No wheezing, rhonchi or rales. Abdominal:      General: Bowel sounds are normal.      Palpations: Abdomen is soft. There is no mass. Tenderness: There is no abdominal tenderness.    Musculoskeletal: General: No deformity. Cervical back: Neck supple. Right lower leg: No edema. Left lower leg: No edema. Lymphadenopathy:      Cervical: No cervical adenopathy. Skin:     General: Skin is warm. Neurological:      General: No focal deficit present. Mental Status: She is alert. Psychiatric:         Mood and Affect: Mood normal.         Behavior: Behavior normal.         Thought Content:  Thought content normal.         Judgment: Judgment normal.       Christy Lu PA-C

## 2023-09-26 ENCOUNTER — EVALUATION (OUTPATIENT)
Dept: PHYSICAL THERAPY | Facility: REHABILITATION | Age: 33
End: 2023-09-26
Payer: MEDICARE

## 2023-09-26 DIAGNOSIS — R29.898 WEAKNESS OF RIGHT LEG: ICD-10-CM

## 2023-09-26 DIAGNOSIS — M54.2 NECK PAIN: ICD-10-CM

## 2023-09-26 DIAGNOSIS — R29.898 WEAKNESS OF RIGHT ARM: ICD-10-CM

## 2023-09-26 DIAGNOSIS — M54.40 LOW BACK PAIN WITH SCIATICA, SCIATICA LATERALITY UNSPECIFIED, UNSPECIFIED BACK PAIN LATERALITY, UNSPECIFIED CHRONICITY: Primary | ICD-10-CM

## 2023-09-26 PROCEDURE — 97163 PT EVAL HIGH COMPLEX 45 MIN: CPT | Performed by: PHYSICAL THERAPIST

## 2023-09-26 NOTE — PROGRESS NOTES
PT Evaluation     Today's date: 2023  Patient name: Sabra Oakley  : 1990  MRN: [id number]  Referring provider: Ruth Saravia PA-C  Dx:   Encounter Diagnosis     ICD-10-CM    1. Low back pain with sciatica, sciatica laterality unspecified, unspecified back pain laterality, unspecified chronicity  M54.40 Ambulatory Referral to Physical Therapy      2. Neck pain  M54.2 Ambulatory Referral to Physical Therapy      3. Weakness of right arm  R29.898 Ambulatory Referral to Physical Therapy      4. Weakness of right leg  R29.898 Ambulatory Referral to Physical Therapy                     Assessment  Assessment details: Pt is a pleasant 33 y.o. female presenting to outpatient physical therapy with Low back pain with sciatica, sciatica laterality unspecified, unspecified back pain laterality, unspecified chronicity  (primary encounter diagnosis), Neck pain, Weakness of right arm, Weakness of right leg. Pt presents with mild strength deficits of R UE. However, patient displays negative signs of peripheral neurologic compromise, WFL range of motion of cervical spine and upper quarter, as well as no red flag signs. Despite negative UMN signs (Swift's, ankle clonus, hyperreflexia), does demonstrate a positive Sharp-Ivette test (decreased tingling in foot, decreased throbbing pain in left UT). Due to multiple past attempts at physical therapy with plateau of progress, ongoing bilateral upper and lower extremity concerns of pain, paresthesias, and weakness, with no descernable etiology, in addition to patient addressing weakness concerns at her gym, skilled PT does not appear to be indicated at this time. Patient may benefit from further diagnostic testing to rule out more CNS or systemic pathological processes. Also encouraged patient to schedule her previously issued referral with neurology department. Findings will be discussed with referring physician.  Thank you for this referral.   Impairments: abnormal coordination, abnormal or restricted ROM, activity intolerance, impaired physical strength and pain with function  Understanding of Dx/Px/POC: good   Prognosis: good    Goals  Short-Term Goals (4 weeks)   1. Patient will decrease worst rating of pain by 25% to improve quality of life. 2. Patient will increase strength by 1/2 MMT to improve quality of life with improved efficiency of daily activities. 3. Patient will improve ROM by 25% indicating improved mobility of affected area. Long-Term Goals (8 weeks)   1. Patient will decrease pain by 50% at worst in comparison to IE indicating significant reduction in pain and improved quality of life. 2. Patient will demonstrate strength WFL compared to IE levels indicating ability to independently manage pain symptoms to accomplish daily activities. 3. Patient will be independent with HEP with good form accomplished. Plan  Patient would benefit from: PT eval and skilled PT  Planned modality interventions: cryotherapy and thermotherapy: hydrocollator packs  Planned therapy interventions: IADL retraining, body mechanics training, flexibility, functional ROM exercises, home exercise program, neuromuscular re-education, manual therapy, postural training, strengthening, stretching, therapeutic activities, therapeutic exercise and joint mobilization  Frequency: 1x month  Duration in visits: 1  Duration in weeks: 1  Treatment plan discussed with: patient        Subjective Evaluation    History of Present Illness  Mechanism of injury: 09/26/23  Pt returns to therapy after hiatus secondary to recommendation from her PCP. Patient states she has continued to have issues with left sided pain in areas of cervical spine, left UE, low back/left hip, and left LE, as well as paresthesias/dysesthesias of right UE and LE. Reports she continues to feel weakness is the primary issue on the right side of her body and pain the main issue on her left side.  Denies injuries to attribute these symptoms. Pt denies bowel or bladder dysfunction (incontinence or retention), saddle anesthesia, fever, chills, nausea, or vomiting. Pt also denies pain at night or recent unexplained weight loss. States she has been more mindful of her posture, works out at [employer] regularly, gets good sleep, eats well, and has been continuing exercises previously issued at [place]. Denies any recent diagnostic scans, aside from radiographs of cervical spine earlier this year. Reports she is still working at [employer]. Notes she had relatively no symptoms while she was away on vacation in [address] [place] [address]. States does not notice long term relief with medications, heat/ice, exercises, aside from short-term effects. Notes she is concerned there is something more going on, aside from general muscle weakness. Patient Goals  Patient goals for therapy: decreased pain and increased strength    Pain  Pain scale: unable to comment, as she is in constant pain, so no baseline to compare to; used to symptoms baseline.           Objective     Concurrent Complaints  Negative for dizziness, faints, nausea/motion sickness, trouble swallowing, difficulty breathing and visual change    Neurological Testing     Sensation   Cervical/Thoracic   Left   Intact: light touch    Right   Diminished: light touch    Lumbar   Left   Intact: light touch    Right   Intact: light touch    Reflexes   Left   Biceps (C5/C6): normal (2+)  Brachioradialis (C6): normal (2+)  Triceps (C7): trace (1+)  Patellar (L4): trace (1+)  Achilles (S1): normal (2+)  Swift's reflex: negative  Clonus sign: negative    Right   Biceps (C5/C6): trace (1+)  Brachioradialis (C6): normal (2+)  Triceps (C7): trace (1+)  Patellar (L4): trace (1+)  Achilles (S1): trace (1+)  Swift's reflex: negative  Clonus sign: negative    Additional Neurological Details  09/26/23  UPPER EXTREMITY MYOTOMES: Decreased RUE strength grossly, however, not in specific myotomal pattern C2-T1  UPPER EXTREMITY DERMATOMES: Intact sensations and symmetrical bilat C2-T1    09/26/23  LOWER EXTREMITY MYOTOMES: WNL, symmetrical, and intact L1-S2   LOWER EXTREMITY DERMATOMES: WNL, symmetrical, and intact L2-S2    Active Range of Motion   Cervical/Thoracic Spine       Cervical    Flexion:  WFL  Extension:  WFL  Left lateral flexion:  WFL  Right lateral flexion:  Restriction level minimal  Left rotation:  WFL  Right rotation:  [facility]    Joint Play   Joints within functional limits: C2, C4, C5, T3, T4, T5, T6, T7, T8 and T9     Pain: C3, C6, C7, T1 and T2     Tests   Cervical   Positive Sharp-Ivette test.  Negative cervical distraction, Lhermitte's sign, alar ligament test and transverse ligament test.     Left Shoulder   Negative ULTT1, ULTT3 and ULTT4. Right Shoulder   Negative ULTT1, ULTT3 and ULTT4.      Additional Tests Details  09/26/23  Mild hypomobility of left UE ULTT    General Comments:    Upper quarter screen   Shoulder: unremarkable    Shoulder Comments   09/26/23  AROM/PROM [facility]             Precautions: n/a    Daily Treatment Diary    Date 9/26            FOTO np            Re-Eval IE            Auth visit # 1               Manuals                                                        Neuro Re-Ed                                                                                                Ther Ex                                                                                                            Ther Activity                              Gait Training                              Modalities

## 2023-10-31 PROBLEM — Z11.4 ENCOUNTER FOR SCREENING FOR HIV: Status: RESOLVED | Noted: 2020-10-01 | Resolved: 2023-10-31

## 2023-10-31 PROBLEM — Z00.00 WELL ADULT EXAM: Status: RESOLVED | Noted: 2020-06-23 | Resolved: 2023-10-31

## 2023-10-31 PROBLEM — Z13.1 DIABETES MELLITUS SCREENING: Status: RESOLVED | Noted: 2020-10-01 | Resolved: 2023-10-31

## 2023-11-18 ENCOUNTER — HOSPITAL ENCOUNTER (EMERGENCY)
Facility: HOSPITAL | Age: 33
Discharge: HOME | End: 2023-11-18
Attending: EMERGENCY MEDICINE
Payer: MEDICARE

## 2023-11-18 ENCOUNTER — OFFICE VISIT (OUTPATIENT)
Dept: URGENT CARE | Age: 33
End: 2023-11-18
Payer: MEDICARE

## 2023-11-18 VITALS
HEART RATE: 80 BPM | DIASTOLIC BLOOD PRESSURE: 74 MMHG | SYSTOLIC BLOOD PRESSURE: 116 MMHG | TEMPERATURE: 98 F | OXYGEN SATURATION: 98 % | RESPIRATION RATE: 18 BRPM

## 2023-11-18 VITALS
DIASTOLIC BLOOD PRESSURE: 72 MMHG | SYSTOLIC BLOOD PRESSURE: 119 MMHG | TEMPERATURE: 98.3 F | OXYGEN SATURATION: 98 % | RESPIRATION RATE: 18 BRPM | HEART RATE: 70 BPM

## 2023-11-18 DIAGNOSIS — R13.10 DYSPHAGIA: ICD-10-CM

## 2023-11-18 DIAGNOSIS — R51.9 ACUTE HEADACHE: Primary | ICD-10-CM

## 2023-11-18 DIAGNOSIS — M54.2 NECK PAIN: ICD-10-CM

## 2023-11-18 DIAGNOSIS — M79.18 MUSCULOSKELETAL PAIN: Primary | ICD-10-CM

## 2023-11-18 PROCEDURE — 99213 OFFICE O/P EST LOW 20 MIN: CPT | Performed by: NURSE PRACTITIONER

## 2023-11-18 PROCEDURE — 99282 EMERGENCY DEPT VISIT SF MDM: CPT

## 2023-11-18 PROCEDURE — 99284 EMERGENCY DEPT VISIT MOD MDM: CPT | Performed by: EMERGENCY MEDICINE

## 2023-11-18 RX ORDER — MAGNESIUM SULFATE HEPTAHYDRATE 40 MG/ML
2 INJECTION, SOLUTION INTRAVENOUS ONCE
Status: DISCONTINUED | OUTPATIENT
Start: 2023-11-18 | End: 2023-11-18 | Stop reason: HOSPADM

## 2023-11-18 RX ORDER — METOCLOPRAMIDE HYDROCHLORIDE 5 MG/ML
10 INJECTION INTRAMUSCULAR; INTRAVENOUS ONCE
Status: DISCONTINUED | OUTPATIENT
Start: 2023-11-18 | End: 2023-11-18 | Stop reason: HOSPADM

## 2023-11-18 NOTE — PROGRESS NOTES
[facility]        NAME: Sabra Oakley is a 29 y.o. female  : 1990    MRN: [id number]  DATE: 2023  TIME: 10:41 AM    Assessment and Plan   Musculoskeletal pain [M79.18]  1. Musculoskeletal pain              Patient Instructions       Advised to go to the Ed for further eval.  Given excuse for work for today, while she gets medical care      Chief Complaint     Chief Complaint   Patient presents with    Mass    Sore Throat     Patient noticed a lump behind her right ear and having some trouble swallowing- concerned about the lump family history of cancer         History of Present Illness       HPI  Reports mass on the back of the right ear. Severe, rating at 8/10. Denies trauma. Started yesterday. Says she has a family Hx of cancer and she is worried about cancer. Also reports reports trouble swallowing. Chronic Hx of swallowing problems, since childhood. However it seems to be worse than baseline since issues with pain, yesterday. Review of Systems   Review of Systems   Musculoskeletal:  Positive for myalgias (back of the right ear). Skin:  Negative for color change, rash and wound.          Current Medications       Current Outpatient Medications:     albuterol (PROVENTIL HFA,VENTOLIN HFA) 90 mcg/act inhaler, Inhale 2 puffs every 6 (six) hours as needed, Disp: , Rfl:     fluticasone (Flovent Diskus) 250 mcg/actuation diskus inhaler, Inhale 1 puff 2 (two) times a day Rinse mouth after use., Disp: 60 blister, Rfl: 2    hydrOXYzine HCL (ATARAX) 25 mg tablet, TAKE 1 TABLET BY MOUTH EVERY 8 HOURS AS NEEDED FOR ANXIETY, Disp: 90 tablet, Rfl: 0    meloxicam (MOBIC) 15 mg tablet, Take 1 tablet (15 mg total) by mouth daily as needed for moderate pain (Patient not taking: Reported on 2023), Disp: 30 tablet, Rfl: 1    tiZANidine (ZANAFLEX) 2 mg tablet, Take 1 tablet (2 mg total) by mouth daily at bedtime as needed for muscle spasms (Patient not taking: Reported on 2023), Disp: 30 tablet, Rfl: 1    varenicline (CHANTIX) 1 mg tablet, Take 1 tablet (1 mg total) by mouth 2 (two) times a day, Disp: 120 tablet, Rfl: 0    varenicline 0.5 MG X 11 & 1 MG X 42 tablet therapy pack, Take 0.5 mg by mouth daily for 3 days, THEN 0.5 mg 2 (two) times a day for 4 days, THEN 1 mg 2 (two) times a day for 21 days. , Disp: 48 each, Rfl: 0    Current Allergies     Allergies as of 11/18/2023 - Reviewed 11/18/2023   Allergen Reaction Noted    No known allergies  10/17/2017            The following portions of the patient's history were reviewed and updated as appropriate: allergies, current medications, past family history, past medical history, past social history, past surgical history and problem list.     Past Medical History:   Diagnosis Date    Acne     Anxiety     Depression     Hypertension        No past surgical history on file. Family History   Problem Relation Age of Onset    Cancer Father         non hodgkins lymphoma    Cancer Paternal Grandmother     Cancer Paternal Grandfather          Medications have been verified. Objective   /74 (BP Location: Right arm, Patient Position: Sitting, Cuff Size: Standard)   Pulse 80   Temp 98 °F (36.7 °C)   Resp 18   SpO2 98%   No LMP recorded. Physical Exam     Physical Exam  HENT:      Right Ear: Tympanic membrane normal.      Left Ear: Tympanic membrane normal.      Mouth/Throat:      Mouth: Mucous membranes are moist.      Pharynx: No posterior oropharyngeal erythema. Musculoskeletal:         General: Tenderness (with very light palpation of the area behind the right ear, slightly toward the bottom, there is severe palpation. She says "the pain is really bad") present. No swelling. Skin:     Findings: No bruising, erythema, lesion or rash.

## 2023-11-18 NOTE — DISCHARGE INSTRUCTIONS
You were evaluated in the emergency department for: headache and behind the ear pain. You can access your current and pending results through [facility]. A radiologist will take a second look at your X-Rays, if you had any, and you will be contacted with any new findings. You should follow-up with your primary care provider, as soon as possible, for re-evaluation. If you do not have a primary care provider, I have referred you to [facility]. You will be contacted about scheduling an appointment. Their phone number is also included on this paperwork. You may take 650mg of tylenol every four to six hours, not exceeding 3,000mg daily, for the management of your discomfort. You may also take ibuprofen, 400mg every six to eight hours. Your workup revealed no emergent features at this time; however, many disease processes are dynamic:    Please, return to the emergency department if you experience new or worsening symptoms, fever, chest pain, shortness of breath, difficulty breathing, dizziness, abdominal pain, persistent nausea/vomiting, syncope or passing out, blood in your urine or stool, coughing up blood, leg swelling/pain, urinary retention, bowel or bladder incontinence, numbness between your legs, drooling, voice change.

## 2023-11-18 NOTE — ED NOTES
Patient refused IV, labs, and medication. Requesting a CT scan and was sent here from urgent care and per the urgent care provider, was instructed to get a CT scan here in the ED.  Patient's concerns communicated to provider and provider will discuss further with patient     Lisette Buchanan RN  11/18/23 1708

## 2023-11-18 NOTE — ED PROVIDER NOTES
History  Chief Complaint   Patient presents with    Ear Problem     Pt reports last night felt a lump behind right ear, states painful. Patient is a 70-year-old female, with a history significant for anxiety and depression, who presents to the ED today due to pain posterior to the right ear since last evening. Patient is concerned about a lump in this area. There is associated headache, similar in character to prior headaches, as well as " mental fogginess."  Patient's symptoms were gradual in onset and have been constant and progressively worsening since their onset. There is no associated fever, trauma, new weakness or numbness (baseline weakness numbness previously evaluated), chest pain, dyspnea, dysuria, polyuria, dysphagia, vision change. Patient has not taken anything, such as Motrin or Tylenol, to remit her symptoms. Touch exacerbates her discomfort. Patient is without other concerns at this time. Per my review of the medical record, MRI brain in 2021 was unremarkable. Prior to Admission Medications   Prescriptions Last Dose Informant Patient Reported? Taking?    albuterol (PROVENTIL HFA,VENTOLIN HFA) 90 mcg/act inhaler   Yes No   Sig: Inhale 2 puffs every 6 (six) hours as needed   fluticasone (Flovent Diskus) 250 mcg/actuation diskus inhaler   No No   Sig: Inhale 1 puff 2 (two) times a day Rinse mouth after use.   hydrOXYzine HCL (ATARAX) 25 mg tablet   No No   Sig: TAKE 1 TABLET BY MOUTH EVERY 8 HOURS AS NEEDED FOR ANXIETY   meloxicam (MOBIC) 15 mg tablet  Self No No   Sig: Take 1 tablet (15 mg total) by mouth daily as needed for moderate pain   Patient not taking: Reported on 4/11/2023   tiZANidine (ZANAFLEX) 2 mg tablet  Self No No   Sig: Take 1 tablet (2 mg total) by mouth daily at bedtime as needed for muscle spasms   Patient not taking: Reported on 4/11/2023   varenicline (CHANTIX) 1 mg tablet   No No   Sig: Take 1 tablet (1 mg total) by mouth 2 (two) times a day   varenicline 0.5 MG X 11 & 1 MG X 42 tablet therapy pack   No No   Sig: Take 0.5 mg by mouth daily for 3 days, THEN 0.5 mg 2 (two) times a day for 4 days, THEN 1 mg 2 (two) times a day for 21 days. Facility-Administered Medications: None       Past Medical History:   Diagnosis Date    Acne     Anxiety     Depression     Hypertension        History reviewed. No pertinent surgical history. Family History   Problem Relation Age of Onset    Cancer Father         non hodgkins lymphoma    Cancer Paternal Grandmother     Cancer Paternal Grandfather      I have reviewed and agree with the history as documented. E-Cigarette/Vaping    E-Cigarette Use Never User      E-Cigarette/Vaping Substances    Nicotine No     THC No     CBD No     Flavoring No     Other No     Unknown No      Social History     Tobacco Use    Smoking status: Heavy Smoker     Packs/day: 0.50     Years: 1.20     Total pack years: 0.60     Types: Cigarettes    Smokeless tobacco: Never    Tobacco comments:     8 cigarettes per day, No passive smoke exposure   Vaping Use    Vaping Use: Never used   Substance Use Topics    Alcohol use: Not Currently    Drug use: Never       Review of Systems   Constitutional:  Negative for fever. HENT:  Negative for trouble swallowing. Eyes:  Negative for visual disturbance. Respiratory:  Negative for shortness of breath. Cardiovascular:  Negative for chest pain. Gastrointestinal:  Negative for abdominal pain. Endocrine: Negative for polyuria. Genitourinary:  Negative for dysuria. Musculoskeletal:  Negative for gait problem. Skin:  Negative for rash. Allergic/Immunologic: Negative for environmental allergies. Neurological:  Positive for headaches. Negative for weakness (No new) and numbness (No new). Hematological:  Negative for adenopathy. Psychiatric/Behavioral:  Negative for confusion. All other systems reviewed and are negative. Physical Exam  Physical Exam  Vitals and nursing note reviewed. Constitutional:       General: She is not in acute distress. Appearance: She is not ill-appearing, toxic-appearing or diaphoretic. HENT:      Head: Normocephalic and atraumatic. Comments: No mastoid process erythema, bogginess, tenderness    Tenderness to palpation inferior to the right ear. Possible early pimple versus hair follicle present at this location. No fluctuance. No pain or proportion to exam.  No abnormal warmth.,  No erythema     Right Ear: Tympanic membrane, ear canal and external ear normal. There is no impacted cerumen. Left Ear: Tympanic membrane, ear canal and external ear normal. There is no impacted cerumen. Nose: Nose normal. No rhinorrhea. Mouth/Throat:      Mouth: Mucous membranes are moist.      Pharynx: Oropharynx is clear. No oropharyngeal exudate or posterior oropharyngeal erythema. Comments: Uvula midline. No oropharyngeal or submandibular mass/swelling  Eyes:      General: No scleral icterus. Right eye: No discharge. Left eye: No discharge. Extraocular Movements: Extraocular movements intact. Conjunctiva/sclera: Conjunctivae normal.      Pupils: Pupils are equal, round, and reactive to light. Cardiovascular:      Rate and Rhythm: Normal rate and regular rhythm. Pulses: Normal pulses. Heart sounds: Normal heart sounds. No murmur heard. No friction rub. No gallop. Comments: 2+ Radial  Pulmonary:      Effort: Pulmonary effort is normal. No respiratory distress. Breath sounds: Normal breath sounds. No stridor. No wheezing, rhonchi or rales. Abdominal:      General: Abdomen is flat. There is no distension. Palpations: Abdomen is soft. Tenderness: There is no abdominal tenderness. There is no right CVA tenderness, left CVA tenderness, guarding or rebound. Musculoskeletal:         General: Tenderness (Along with the trapezius on the right side) present. No deformity.       Cervical back: Normal range of motion and neck supple. No rigidity or tenderness. No muscular tenderness. Lymphadenopathy:      Cervical: No cervical adenopathy. Skin:     General: Skin is warm and dry. Capillary Refill: Capillary refill takes less than 2 seconds. Neurological:      Mental Status: She is alert. Comments: Cranial nerves 2-12 intact. 5/5 strength in all four extremities including finger extension against resistance. Sensation to light touch subjectively intact/equal in all four extremities and the face. Patient able to ambulate without difficulty. Patient is speaking clearly in complete sentences. Patient is answering appropriately and able to follow commands.     Psychiatric:         Behavior: Behavior normal.      Comments: anxious affect         Vital Signs  ED Triage Vitals [11/18/23 1112]   Temperature Pulse Respirations Blood Pressure SpO2   98.3 °F (36.8 °C) 70 18 119/72 98 %      Temp Source Heart Rate Source Patient Position - Orthostatic VS BP Location FiO2 (%)   Oral Monitor Sitting Right arm --      Pain Score       --           Vitals:    11/18/23 1112   BP: 119/72   Pulse: 70   Patient Position - Orthostatic VS: Sitting         Visual Acuity      ED Medications  Medications   metoclopramide (REGLAN) injection 10 mg (has no administration in time range)   magnesium sulfate 2 g/50 mL IVPB (premix) 2 g (has no administration in time range)       Diagnostic Studies  Results Reviewed       Procedure Component Value Units Date/Time    CBC and differential [[id number]]     Lab Status: No result Specimen: Blood     Comprehensive metabolic panel [[id number]]     Lab Status: No result Specimen: Blood     hCG, qualitative pregnancy [[id number]]     Lab Status: No result Specimen: Blood                    No orders to display              Procedures  Procedures         ED Course  ED Course as of 11/18/23 1315   Sat Nov 18, 2023   1249 Per my review of the medical record, patient was evaluated at urgent care prior to evaluation in the ED for sore throat and difficulty swallowing. It is also documented in the note that patient is concerned for cancer due to family history of cancer. Patient denied dysphagia during acquisition of history in the emergency department and she did not express these other concerns. 1300 Alerted by nursing that patient is refusing blood work/analgesia and demanding CT imaging. On reevaluation of patient, patient agitated but verbally deescalatable. It was explained that the blood work was ordered to make sure it safe to give her medication, such as Toradol to try and help with her pain and headache; however, patient states that the pain is okay and she does not want anything for pain right now. I had extensive conversation with the patient regarding her reassuring physical exam (no focal neurodeficit, no oropharyngeal swelling, toleration of secretions, clear voice) and reassuring vital signs and it was explained how CT would likely be of little utility. As patient described to the urgent care about chronic dysphagia, will refer to GI. After my conversation with patient regarding options, patient requesting to go home. Given reassuring exam and vital signs, this is a reasonable course of action. Strict return precautions provided   1307 Per my review of the medical record, patient was evaluated by her PCP on 9/23 and noted to have neck pain and was referred to physical therapy                               SBIRT 20yo+      Flowsheet Row Most Recent Value   Initial Alcohol Screen: US AUDIT-C     1. How often do you have a drink containing alcohol? 0 Filed at: 11/18/2023 1112   2. How many drinks containing alcohol do you have on a typical day you are drinking? 0 Filed at: 11/18/2023 1112   3a. Male UNDER 65: How often do you have five or more drinks on one occasion? 0 Filed at: 11/18/2023 1112   3b. FEMALE Any Age, or MALE 65+:  How often do you have 4 or more drinks on one occassion? 0 Filed at: 11/18/2023 1112   Audit-C Score 0 Filed at: 11/18/2023 1112                      Medical Decision Making  Patient is a 81-year-old female, with a history significant for anxiety and depression, who presents to the ED today due to pain posterior to the right ear since last evening. Patient is concerned about a lump in this area. There is associated headache, similar in character to prior headaches, as well as " mental fogginess."  Patient's symptoms were gradual in onset and have been constant and progressively worsening since their onset. There is no associated fever, trauma, new weakness or numbness, chest pain, dyspnea, dysuria, polyuria, dysphagia, vision change. Patient has not taken anything, such as Motrin or Tylenol, to remit her symptoms. Touch exacerbates her discomfort. Patient is currently afebrile and hemodynamically stable. Her physical exam is notable for no oropharyngeal or submandibular swelling, tenderness palpation posterior and inferior to the right ear without any mastoid process tenderness/erythema/bogginess, clear TMs bilaterally, clear heart and lungs auscultation, soft nontender abdomen, no focal neurodeficit, tenderness palpation of the trapezius along the right side. This presentation is concerning for: Primary headache, anxiety, chronic pain, early pimple/ingrown hair, sprain, strain. No reason to suspect mastoiditis, SAH, meningitis, retropharyngeal abscess, cellulitis, abscess at this time based on history physical exam.  Will investigate with pregnancy test, CBC, CMP. Will manage with Reglan, magnesium, further based upon work-up. Amount and/or Complexity of Data Reviewed  Labs: ordered. Risk  Prescription drug management.              Disposition  Final diagnoses:   Neck pain   Acute headache   Dysphagia     Time reflects when diagnosis was documented in both MDM as applicable and the Disposition within this note       Time User Action Codes Description Comment    11/18/2023 12:51 PM Brendan Confer A Add [M54.2] Neck pain     11/18/2023 12:51 PM Brendan Confer A Add [R51.9] Acute headache     11/18/2023 12:51 PM Brendan Confer Modify [M54.2] Neck pain     11/18/2023 12:51 PM Brendan Confer A Modify [R51.9] Acute headache     11/18/2023  1:06 PM Brendan Confer Add [R13.10] Dysphagia           ED Disposition       ED Disposition   Discharge    Condition   Stable    Date/Time   Sat Nov 18, 2023 [facility] discharge to home/self care.                    Follow-up Information       Follow up With Specialties Details Why Contact Info Additional Information    Christy Lu PA-C Family Medicine, Physician Assistant Schedule an appointment as soon as possible for a visit   [address] [facility] [phone]       [facility] [address] [facility] Schedule an appointment as soon as possible for a visit   [address] [facility],[address] Floor [id number]  [address] [place] [address]  [facility], [address] [place] [address], [id number], [phone]            Patient's Medications   Discharge Prescriptions    No medications on file           PDMP Review       None            ED Provider  Electronically Signed by             Reagan Blackburn MD  11/18/23 [address] Abe Wilks MD  11/18/23 5685

## 2023-11-18 NOTE — LETTER
November 18, 2023     Patient: Farhat Conte   YOB: 1990   Date of Visit: 11/18/2023       To Whom it May Concern:    Vickie Raygoza was seen in my clinic on 11/18/2023. She may return to work on 11/19/2023 . If you have any questions or concerns, please don't hesitate to call.          Sincerely,          CINTHYA Foote        CC: No Recipients

## 2024-02-21 PROBLEM — Z13.220 LIPID SCREENING: Status: RESOLVED | Noted: 2020-10-01 | Resolved: 2024-02-21

## 2024-03-28 ENCOUNTER — OFFICE VISIT (OUTPATIENT)
Dept: FAMILY MEDICINE CLINIC | Facility: CLINIC | Age: 34
End: 2024-03-28
Payer: MEDICARE

## 2024-03-28 VITALS
SYSTOLIC BLOOD PRESSURE: 120 MMHG | TEMPERATURE: 97 F | HEIGHT: 67 IN | HEART RATE: 87 BPM | BODY MASS INDEX: 23.2 KG/M2 | WEIGHT: 147.8 LBS | DIASTOLIC BLOOD PRESSURE: 80 MMHG | RESPIRATION RATE: 18 BRPM | OXYGEN SATURATION: 99 %

## 2024-03-28 DIAGNOSIS — M54.40 LOW BACK PAIN WITH SCIATICA, SCIATICA LATERALITY UNSPECIFIED, UNSPECIFIED BACK PAIN LATERALITY, UNSPECIFIED CHRONICITY: ICD-10-CM

## 2024-03-28 DIAGNOSIS — M79.18 MYOFASCIAL PAIN SYNDROME: ICD-10-CM

## 2024-03-28 DIAGNOSIS — M54.16 LUMBAR RADICULITIS: ICD-10-CM

## 2024-03-28 DIAGNOSIS — R06.02 SHORTNESS OF BREATH: ICD-10-CM

## 2024-03-28 DIAGNOSIS — F41.1 ANXIETY STATE: ICD-10-CM

## 2024-03-28 DIAGNOSIS — M25.512 CHRONIC LEFT SHOULDER PAIN: ICD-10-CM

## 2024-03-28 DIAGNOSIS — G89.29 CHRONIC LEFT SHOULDER PAIN: ICD-10-CM

## 2024-03-28 DIAGNOSIS — R20.2 PARESTHESIAS: ICD-10-CM

## 2024-03-28 DIAGNOSIS — G56.01 CARPAL TUNNEL SYNDROME OF RIGHT WRIST: ICD-10-CM

## 2024-03-28 DIAGNOSIS — K21.9 GASTROESOPHAGEAL REFLUX DISEASE, UNSPECIFIED WHETHER ESOPHAGITIS PRESENT: ICD-10-CM

## 2024-03-28 DIAGNOSIS — F17.210 CIGARETTE NICOTINE DEPENDENCE WITHOUT COMPLICATION: ICD-10-CM

## 2024-03-28 DIAGNOSIS — R29.898 WEAKNESS OF RIGHT LEG: Primary | ICD-10-CM

## 2024-03-28 PROCEDURE — 99214 OFFICE O/P EST MOD 30 MIN: CPT | Performed by: PHYSICIAN ASSISTANT

## 2024-03-28 RX ORDER — TIZANIDINE 2 MG/1
2 TABLET ORAL
Qty: 30 TABLET | Refills: 1 | Status: SHIPPED | OUTPATIENT
Start: 2024-03-28

## 2024-03-28 RX ORDER — FLUTICASONE PROPIONATE 250 UG/1
1 POWDER, METERED RESPIRATORY (INHALATION) 2 TIMES DAILY
Qty: 60 BLISTER | Refills: 2 | Status: SHIPPED | OUTPATIENT
Start: 2024-03-28 | End: 2024-06-26

## 2024-03-28 RX ORDER — PREDNISONE 10 MG/1
TABLET ORAL
Qty: 20 TABLET | Refills: 0 | Status: SHIPPED | OUTPATIENT
Start: 2024-03-28

## 2024-03-28 RX ORDER — NICOTINE 21 MG/24HR
1 PATCH, TRANSDERMAL 24 HOURS TRANSDERMAL EVERY 24 HOURS
Qty: 14 PATCH | Refills: 2 | Status: SHIPPED | OUTPATIENT
Start: 2024-03-28

## 2024-03-28 RX ORDER — ALBUTEROL SULFATE 90 UG/1
2 AEROSOL, METERED RESPIRATORY (INHALATION) EVERY 6 HOURS PRN
Qty: 18 G | Refills: 1 | Status: SHIPPED | OUTPATIENT
Start: 2024-03-28

## 2024-03-28 NOTE — ASSESSMENT & PLAN NOTE
- She states that she did see the specialist and was told that she probably has asthma.  She has noticed benefit with the Flovent and the albuterol inhalers which she will continue as directed.

## 2024-03-28 NOTE — ASSESSMENT & PLAN NOTE
She states her reflux has been under good control because of diet.  - I did advise her that the prednisone could aggravate her reflux and encouraged to take the medication with food.

## 2024-03-28 NOTE — ASSESSMENT & PLAN NOTE
- She would like an x-ray of her left shoulder since she has been noticing some popping.  - I did recommend she follow-up with orthopedic surgery

## 2024-03-28 NOTE — ASSESSMENT & PLAN NOTE
- Nicotine patch 21 mg, change every 24 hours, for 6 weeks  She has been advised to call me if she is having success to get the 14 mg patch for 6 weeks and then the 7 mg patch for 6 weeks  She is currently smoking 1 pack of cigarettes every 2 to 3 days

## 2024-03-28 NOTE — ASSESSMENT & PLAN NOTE
Refer to hand surgery.  She states that she did get temporary benefit from an injection she got last year

## 2024-03-28 NOTE — PROGRESS NOTES
Name: Randee Leiva      : 1990      MRN: [id number]  Encounter Provider: Christy Lu PA-C  Encounter Date: 3/28/2024   [facility]    Assessment & Plan     1. Weakness of right leg  Assessment & Plan:  - She did not notice any benefit with PT  - I did give her the phone number again for neurology    Orders:  -     Ambulatory Referral to Neurology; Future    2. Paresthesias  Assessment & Plan:  Paresthesias of right side.  Phone number given again for neurology    Orders:  -     Ambulatory Referral to Neurology; Future    3. Low back pain with sciatica, sciatica laterality unspecified, unspecified back pain laterality, unspecified chronicity  Assessment & Plan:  - Prednisone taper  - Refer to pain management    Orders:  -     Ambulatory referral to Spine & Pain Management; Future  -     predniSONE 10 mg tablet; Take 4 tablets for 2 days then decrease by 1 tablet every 2 days until you complete the course with 1 tablet for 2 days    4. Lumbar radiculitis  Assessment & Plan:  - She did request prednisone treatment for her left sciatica.  She is aware to take the medication with food so it does not aggravate reflux  - She did request a referral again to pain management because she did get an injection last year which did benefit her and was hoping to get another.    Orders:  -     Ambulatory referral to Spine & Pain Management; Future  -     predniSONE 10 mg tablet; Take 4 tablets for 2 days then decrease by 1 tablet every 2 days until you complete the course with 1 tablet for 2 days    5. Carpal tunnel syndrome of right wrist  Assessment & Plan:  Refer to hand surgery.  She states that she did get temporary benefit from an injection she got last year    Orders:  -     Ambulatory Referral to Orthopedic Surgery; Future    6. Myofascial pain syndrome  -     tiZANidine (ZANAFLEX) 2 mg tablet; Take 1 tablet (2 mg total) by mouth daily at bedtime as  needed for muscle spasms    7. Shortness of breath  Assessment & Plan:  - She states that she did see the specialist and was told that she probably has asthma.  She has noticed benefit with the Flovent and the albuterol inhalers which she will continue as directed.    Orders:  -     fluticasone (Flovent Diskus) 250 mcg/actuation diskus inhaler; Inhale 1 puff 2 (two) times a day Rinse mouth after use.  -     albuterol (PROVENTIL HFA,VENTOLIN HFA) 90 mcg/act inhaler; Inhale 2 puffs every 6 (six) hours as needed for shortness of breath    8. Cigarette nicotine dependence without complication  Assessment & Plan:  - Nicotine patch 21 mg, change every 24 hours, for 6 weeks  She has been advised to call me if she is having success to get the 14 mg patch for 6 weeks and then the 7 mg patch for 6 weeks  She is currently smoking 1 pack of cigarettes every 2 to 3 days    Orders:  -     nicotine (NICODERM CQ) 21 mg/24 hr TD 24 hr patch; Place 1 patch on the skin over 24 hours every 24 hours    9. Chronic left shoulder pain  Assessment & Plan:  - She would like an x-ray of her left shoulder since she has been noticing some popping.  - I did recommend she follow-up with orthopedic surgery    Orders:  -     XR shoulder 2+ vw right; Future; Expected date: 03/28/2024    10. Gastroesophageal reflux disease, unspecified whether esophagitis present  Assessment & Plan:  She states her reflux has been under good control because of diet.  - I did advise her that the prednisone could aggravate her reflux and encouraged to take the medication with food.      11. Anxiety state  Assessment & Plan:  She states her anxiety is related to her medical problems.  If her symptoms improve her anxiety decreases.  She prefers not to have treatment at this time because she did not do well with the hydroxyzine which seem to exacerbate her symptoms and made her very drowsy.      I did recommend follow-up after September 11 to include her annual  physical    M*Linea software was used to dictate this note. It may contain errors with dictating incorrect words/spelling. Please contact provider directly for any questions.          Subjective      Patient presents today for follow-up.  She states that she needs a phone number again for neurology for her weakness on the right side along with paresthesias.  She never made an appointment.  -She did see a hand specialist last year and she had an injection for carpal tunnel which helped but she states that she is noticing some of her symptoms are returning so she would like to see the specialist again  -She would also like to see the pain management specialist again because she did get an injection in her back last year for her left-sided sciatica which did help but now symptoms are returning.  - She did see a specialist and was placed on Flovent and albuterol and was told that she may have asthma.  She has noticed some benefit with the medications.  She would like refills.  -She does smoke 1 pack of cigarettes every 2 to 3 days.  She is frustrated with smoking.  She would like to quit.  She was on Chantix in the past but had nightmares.      Review of Systems   Constitutional: Negative.    Respiratory:  Positive for shortness of breath (The inhalers have helped).    Musculoskeletal:         As stated in HPI   Neurological:         As stated in HPI   Psychiatric/Behavioral:  Positive for agitation (Frustrated because of all the medical problems she currently has.  She tried hydroxyzine but it seemed to make her very drowsy and she felt as though it exacerbated her symptoms.).        Current Outpatient Medications on File Prior to Visit   Medication Sig    [DISCONTINUED] hydrOXYzine HCL (ATARAX) 25 mg tablet TAKE 1 TABLET BY MOUTH EVERY 8 HOURS AS NEEDED FOR ANXIETY    meloxicam (MOBIC) 15 mg tablet Take 1 tablet (15 mg total) by mouth daily as needed for moderate pain (Patient not taking: Reported on 4/11/2023)     "[DISCONTINUED] albuterol (PROVENTIL HFA,VENTOLIN HFA) 90 mcg/act inhaler Inhale 2 puffs every 6 (six) hours as needed (Patient not taking: Reported on 3/28/2024)    [DISCONTINUED] fluticasone (Flovent Diskus) 250 mcg/actuation diskus inhaler Inhale 1 puff 2 (two) times a day Rinse mouth after use. (Patient not taking: Reported on 3/28/2024)    [DISCONTINUED] tiZANidine (ZANAFLEX) 2 mg tablet Take 1 tablet (2 mg total) by mouth daily at bedtime as needed for muscle spasms (Patient not taking: Reported on 4/11/2023)    [DISCONTINUED] varenicline (CHANTIX) 1 mg tablet Take 1 tablet (1 mg total) by mouth 2 (two) times a day (Patient not taking: Reported on 3/28/2024)    [DISCONTINUED] varenicline 0.5 MG X 11 & 1 MG X 42 tablet therapy pack Take 0.5 mg by mouth daily for 3 days, THEN 0.5 mg 2 (two) times a day for 4 days, THEN 1 mg 2 (two) times a day for 21 days. (Patient not taking: Reported on 3/28/2024)       Objective     /80 (BP Location: Left arm, Patient Position: Sitting, Cuff Size: Large)   Pulse 87   Temp (!) 97 °F (36.1 °C) (Tympanic)   Resp 18   Ht 5' 7\" (1.702 m)   Wt 67 kg (147 lb 12.8 oz)   SpO2 99%   BMI 23.15 kg/m²     Physical Exam  Vitals reviewed.   Constitutional:       General: She is not in acute distress.     Appearance: Normal appearance. She is not ill-appearing, toxic-appearing or diaphoretic.   HENT:      Head: Normocephalic and atraumatic.   Cardiovascular:      Rate and Rhythm: Normal rate and regular rhythm.      Heart sounds: Normal heart sounds. No murmur heard.  Pulmonary:      Effort: Pulmonary effort is normal. No respiratory distress.      Breath sounds: Normal breath sounds. No wheezing, rhonchi or rales.   Musculoskeletal:      Cervical back: Neck supple.   Neurological:      General: No focal deficit present.      Mental Status: She is alert.   Psychiatric:         Mood and Affect: Mood normal.         Behavior: Behavior normal.         Thought Content: Thought " content normal.         Judgment: Judgment normal.       Christy Lu PA-C

## 2024-03-28 NOTE — ASSESSMENT & PLAN NOTE
She states her anxiety is related to her medical problems.  If her symptoms improve her anxiety decreases.  She prefers not to have treatment at this time because she did not do well with the hydroxyzine which seem to exacerbate her symptoms and made her very drowsy.

## 2024-03-28 NOTE — ASSESSMENT & PLAN NOTE
- She did request prednisone treatment for her left sciatica.  She is aware to take the medication with food so it does not aggravate reflux  - She did request a referral again to pain management because she did get an injection last year which did benefit her and was hoping to get another.

## 2024-04-02 ENCOUNTER — OFFICE VISIT (OUTPATIENT)
Dept: OBGYN CLINIC | Facility: CLINIC | Age: 34
End: 2024-04-02
Payer: MEDICARE

## 2024-04-02 VITALS
HEIGHT: 67 IN | BODY MASS INDEX: 23.07 KG/M2 | SYSTOLIC BLOOD PRESSURE: 118 MMHG | DIASTOLIC BLOOD PRESSURE: 70 MMHG | WEIGHT: 147 LBS

## 2024-04-02 DIAGNOSIS — M25.512 CHRONIC LEFT SHOULDER PAIN: ICD-10-CM

## 2024-04-02 DIAGNOSIS — G89.29 CHRONIC LEFT SHOULDER PAIN: ICD-10-CM

## 2024-04-02 DIAGNOSIS — M75.42 IMPINGEMENT SYNDROME OF LEFT SHOULDER: Primary | ICD-10-CM

## 2024-04-02 PROCEDURE — 99214 OFFICE O/P EST MOD 30 MIN: CPT | Performed by: PHYSICIAN ASSISTANT

## 2024-04-02 RX ORDER — MELOXICAM 15 MG/1
15 TABLET ORAL DAILY
Qty: 30 TABLET | Refills: 0 | Status: SHIPPED | OUTPATIENT
Start: 2024-04-02

## 2024-04-02 NOTE — PROGRESS NOTES
"Patient Name:  Randee Leiva  MRN:  [id number]    Assessment & Plan     Left shoulder pain, likely rotator cuff tendinitis/bursitis.  Offered subacromial space corticosteroid injection today.  Patient declined.  Prescription for meloxicam.  Patient was recently prescribed prednisone by her PCP.  Advised she may complete the prednisone first and then initiate the meloxicam.  Referral also provided for physical therapy.  Activities as tolerated modification avoid pain.  Follow-up in 6 weeks.  Discussed obtaining MRI if symptoms persist.    Chief Complaint     Left shoulder pain    History of the Present Illness     Randee Leiva is a 33 y.o. right-hand-dominant female who reports to the office today for evaluation of her left shoulder.  She notes an onset of pain approximately 1 year ago.  She states the pain began shortly after starting a new job at [employer].  She denies any specific injury or trauma but her job requires a lot of repetitive use and lifting.  She notes primarily lateral based shoulder pain with radiation distally to the elbow and approximately to the base of the cervical spine.  Pain is worse with repetitive use and lifting.  She denies any weakness or instability.  She also notes increased pain with lying on her left side at night.  She denies any significant numbness and tingling left upper extremity.  She has tried massage therapy as well as heat and cold without lasting improvement.  She currently takes nothing for pain.  She was seen recently by her PCP who prescribed prednisone but has yet to pick this up from the pharmacy.    Physical Exam     /70   Ht 5' 7\" (1.702 m)   Wt 66.7 kg (147 lb)   BMI 23.02 kg/m²     Left shoulder: No gross deformity.  No tenderness anterior shoulder, lateral shoulder, posterior shoulder, and AC joint. PROM is , ER-abd 90, IR-abd 50.  Impingement signs are positive.  Empty can test is positive.  Speed's Test is mildly positive.  Cross-body adduction " test is negative.  5 out of 5 forward flexion strength.  Sensation intact axillary, median, ulnar and radial nerves.  2+ radial pulse.    Eyes: Anicteric sclerae.  ENT: Trachea midline.  Lungs: Normal respiratory effort.  CV: Capillary refill is less than 2 seconds.  Skin: Intact without erythema.  Lymph: No palpable lymphadenopathy.  Neuro: Sensation is grossly intact to light touch.  Psych: Mood and affect are appropriate.    Data Review     I have personally reviewed pertinent films in PACS, and my interpretation follows:    X-rays left shoulder 4/2/2024: No acute osseous abnormality.  No fracture or dislocation.  No significant degenerative changes.    Past Medical History:   Diagnosis Date    Acne     Anxiety     Depression     Hypertension        History reviewed. No pertinent surgical history.    Allergies   Allergen Reactions    No Known Allergies        Current Outpatient Medications on File Prior to Visit   Medication Sig Dispense Refill    albuterol (PROVENTIL HFA,VENTOLIN HFA) 90 mcg/act inhaler Inhale 2 puffs every 6 (six) hours as needed for shortness of breath 18 g 1    fluticasone (Flovent Diskus) 250 mcg/actuation diskus inhaler Inhale 1 puff 2 (two) times a day Rinse mouth after use. 60 blister 2    nicotine (NICODERM CQ) 21 mg/24 hr TD 24 hr patch Place 1 patch on the skin over 24 hours every 24 hours 14 patch 2    predniSONE 10 mg tablet Take 4 tablets for 2 days then decrease by 1 tablet every 2 days until you complete the course with 1 tablet for 2 days 20 tablet 0    tiZANidine (ZANAFLEX) 2 mg tablet Take 1 tablet (2 mg total) by mouth daily at bedtime as needed for muscle spasms 30 tablet 1    meloxicam (MOBIC) 15 mg tablet Take 1 tablet (15 mg total) by mouth daily as needed for moderate pain (Patient not taking: Reported on 4/11/2023) 30 tablet 1     No current facility-administered medications on file prior to visit.       Social History     Tobacco Use    Smoking status: Heavy Smoker      Current packs/day: 0.50     Average packs/day: 0.5 packs/day for 1.2 years (0.6 ttl pk-yrs)     Types: Cigarettes    Smokeless tobacco: Never    Tobacco comments:     8 cigarettes per day, No passive smoke exposure   Vaping Use    Vaping status: Never Used   Substance Use Topics    Alcohol use: Not Currently    Drug use: Never       Family History   Problem Relation Age of Onset    Cancer Father         non hodgkins lymphoma    Cancer Paternal Grandmother     Cancer Paternal Grandfather        Review of Systems     As stated in the HPI. All other systems reviewed and are negative.

## 2024-04-25 ENCOUNTER — EVALUATION (OUTPATIENT)
Dept: PHYSICAL THERAPY | Facility: REHABILITATION | Age: 34
End: 2024-04-25
Payer: MEDICARE

## 2024-04-25 DIAGNOSIS — G89.29 CHRONIC LEFT SHOULDER PAIN: ICD-10-CM

## 2024-04-25 DIAGNOSIS — M75.42 IMPINGEMENT SYNDROME OF LEFT SHOULDER: ICD-10-CM

## 2024-04-25 DIAGNOSIS — M25.512 CHRONIC LEFT SHOULDER PAIN: ICD-10-CM

## 2024-04-25 PROCEDURE — 97110 THERAPEUTIC EXERCISES: CPT

## 2024-04-25 PROCEDURE — 97161 PT EVAL LOW COMPLEX 20 MIN: CPT

## 2024-04-25 NOTE — PROGRESS NOTES
PT Evaluation     Today's date: 2024  Patient name: Randee Leiva  : 1990  MRN: [id number]  Referring provider: Contreras Freeman P*  Dx:   Encounter Diagnosis     ICD-10-CM    1. Impingement syndrome of left shoulder  M75.42 Ambulatory Referral to Physical Therapy      2. Chronic left shoulder pain  M25.512 Ambulatory Referral to Physical Therapy    G89.29                      Assessment  Assessment details: Pt, Randee Leiva , is a 33 y.o.  presenting to physical therapy on this date for an initial evaluation with reports of chronic L shoulder pain without ELENI. Upon eval on this date, pt presented with WFL and painful ROM, minimally decreased LUE strength, and overall impaired tolerance to functional activities due to pain. During initial evaluation, pt reported that she has more pressing medical concerns that she would like to address at this time than her shoulder and if she can work on things at home, she would prefer to do so. Pt was educated that while recommendations can be made, it is ultimately her decision to attend/complete therapy or not. Pt declined further therapy at this time and requested HEP to work on at home instead. Pt was educated on proper lifting mechanics and also provided with an initial HEP targeting stretching based exercises. Pt was educated to contact PT office with any questions, concerns, or if she needed to return to therapy, pt verbalized understanding.   Impairments: abnormal muscle firing, abnormal muscle tone, abnormal or restricted ROM, abnormal movement, activity intolerance, impaired physical strength, lacks appropriate home exercise program, pain with function, poor posture  and poor body mechanics    Goals  EVAL ONLY     Plan  Plan details: Pt declined further completion of therapy at this time and was provided with HEP.   Patient would benefit from: PT eval  Frequency: 1x week  Duration in visits: 1  Duration in weeks: 1  Treatment plan discussed  with: patient        Subjective Evaluation    History of Present Illness  Mechanism of injury: Pt reports that her shoulder has been bothering her for a good year without known ELENI. She reports that her rotor cuff  is inflamed. She reports that her work was too repetitive so she had to drop down to part time and it was still too much. She reports that as soon as she would work her pain would come back on. Pt reports that lying on her left side bothered her as well. She reports that she has tried to use her right side more to avoid aggravating he shoulder. She reports that she thinks she has a disease that is making one side of her body weaker than the other. Pt denies any n/t in her left arm but she does have it on her right side. She reports that her pain does go up into her head and she does get headaches and the left side of her head throbs.   Patient Goals  Patient goals for therapy: decreased pain, increased motion, increased strength and independence with ADLs/IADLs    Pain  Current pain ratin  At worst pain rating: 10  Quality: sharp, dull ache and discomfort  Aggravating factors: overhead activity and lifting    Social Support    Employment status: working (computer work)  Hand dominance: right          Objective     Palpation   Left   No palpable tenderness to the biceps and rhomboids.   Tenderness of the biceps, levator scapulae, triceps and upper trapezius.     Tenderness     Left Shoulder   Tenderness in the AC joint and biceps tendon (proximal). No tenderness in the lateral scapula, medial scapula and SC joint.     Neurological Testing     Sensation     Shoulder   Left Shoulder   Intact: light touch    Active Range of Motion   Left Shoulder   Flexion: WFL and with pain  Adduction: WFL and with pain  External rotation BTH: WFL and with pain  Internal rotation BTB: WFL and with pain    Strength/Myotome Testing     Left Shoulder     Planes of Motion   Flexion: 4+   Abduction: 4+   External rotation at  0°: 4+   Internal rotation at 0°: 4+     Isolated Muscles   Biceps: 4+   Triceps: 4+     Additional Strength Details  Pt reported pain through MMT     Tests     Left Shoulder   Positive empty can, full can and Hawkin's.   Negative Speed's.              Precautions:   Patient Active Problem List   Diagnosis    Anxiety state    Migraine without aura, with intractable migraine, so stated, with status migrainosus    Right hand pain    Carpal tunnel syndrome of right wrist    Laryngopharyngeal reflux (LPR)    Gastroesophageal reflux disease    Right leg paresthesias    Paresthesias    Chronic headaches    Numbness and tingling in right hand    Arthralgia of right temporomandibular joint    Neck pain    Skin lesion    COVID-19 virus infection    Musculoskeletal chest pain    Leukopenia    Hypocalcemia    Lumbar radiculitis    Cigarette nicotine dependence without complication    Shortness of breath    Low back pain with sciatica    Carpal tunnel syndrome on left    Bilateral foot pain    Eosinophilia    Abnormal PFT    Sacroiliitis (HCC)    BMI 28.0-28.9,adult    Weakness of right arm    Weakness of right leg    Chronic left shoulder pain        EVAL ONLY 4/25/24     HEP GIVEN BELOW     Access Code: [email or web address]  URL: [email or web address]  Date: 04/25/2024  Prepared by: Dee Gonzalez    Exercises  - Seated Scapular Retraction  - 2 x daily - 7 x weekly - 10 reps - 5 hold  - Seated Upper Trapezius Stretch (Mirrored)  - 2 x daily - 7 x weekly - 4 reps - 20 hold  - Gentle Levator Scapulae Stretch  - 2 x daily - 7 x weekly - 4 reps - 20 hold  - Seated Scalene Stretch with Towel  - 2 x daily - 7 x weekly - 4 reps - 20 hold  - Shoulder External Rotation and Scapular Retraction  - 3-4 x weekly - 10 reps - 5 hold  - Sidelying Shoulder External Rotation  - 3-4 x weekly - 10 reps - 5 hold

## 2024-05-06 ENCOUNTER — OFFICE VISIT (OUTPATIENT)
Dept: URGENT CARE | Age: 34
End: 2024-05-06
Payer: MEDICARE

## 2024-05-06 VITALS
RESPIRATION RATE: 18 BRPM | OXYGEN SATURATION: 98 % | HEART RATE: 89 BPM | SYSTOLIC BLOOD PRESSURE: 100 MMHG | DIASTOLIC BLOOD PRESSURE: 55 MMHG | TEMPERATURE: 97.9 F | WEIGHT: 147 LBS | BODY MASS INDEX: 23.02 KG/M2

## 2024-05-06 DIAGNOSIS — J06.9 VIRAL URI WITH COUGH: Primary | ICD-10-CM

## 2024-05-06 DIAGNOSIS — J02.9 SORE THROAT: ICD-10-CM

## 2024-05-06 DIAGNOSIS — R06.2 WHEEZING: ICD-10-CM

## 2024-05-06 LAB — S PYO AG THROAT QL: NEGATIVE

## 2024-05-06 PROCEDURE — 94640 AIRWAY INHALATION TREATMENT: CPT | Performed by: STUDENT IN AN ORGANIZED HEALTH CARE EDUCATION/TRAINING PROGRAM

## 2024-05-06 PROCEDURE — 99213 OFFICE O/P EST LOW 20 MIN: CPT | Performed by: STUDENT IN AN ORGANIZED HEALTH CARE EDUCATION/TRAINING PROGRAM

## 2024-05-06 PROCEDURE — 87880 STREP A ASSAY W/OPTIC: CPT | Performed by: STUDENT IN AN ORGANIZED HEALTH CARE EDUCATION/TRAINING PROGRAM

## 2024-05-06 RX ORDER — FLUTICASONE PROPIONATE 50 MCG
1 SPRAY, SUSPENSION (ML) NASAL DAILY
Qty: 11.1 ML | Refills: 0 | Status: SHIPPED | OUTPATIENT
Start: 2024-05-06

## 2024-05-06 RX ORDER — BROMPHENIRAMINE MALEATE, PSEUDOEPHEDRINE HYDROCHLORIDE, AND DEXTROMETHORPHAN HYDROBROMIDE 2; 30; 10 MG/5ML; MG/5ML; MG/5ML
5 SYRUP ORAL 3 TIMES DAILY PRN
Qty: 120 ML | Refills: 0 | Status: SHIPPED | OUTPATIENT
Start: 2024-05-06

## 2024-05-06 RX ORDER — IPRATROPIUM BROMIDE AND ALBUTEROL SULFATE 2.5; .5 MG/3ML; MG/3ML
3 SOLUTION RESPIRATORY (INHALATION) ONCE
Status: COMPLETED | OUTPATIENT
Start: 2024-05-06 | End: 2024-05-06

## 2024-05-06 RX ADMIN — IPRATROPIUM BROMIDE AND ALBUTEROL SULFATE 3 ML: 2.5; .5 SOLUTION RESPIRATORY (INHALATION) at 17:50

## 2024-05-06 NOTE — PATIENT INSTRUCTIONS
I am sending in a medication to help with the congestion and dry cough.  I am also sending in Flonase nasal spray to help reduce postnasal drip.  You can buy this over-the-counter if is not covered by insurance.  Your symptoms should start to turn around the next couple days, please come back to see us or your PCP if you are not starting to improve, having continued fevers or worsening of your symptoms.  You may try your albuterol inhaler as needed if you do feel wheezing or tightness with breathing.  If you feel any severe shortness of breath, please go to the ER or call 911.

## 2024-05-06 NOTE — PROGRESS NOTES
[facility]        NAME: Randee Leiva is a 33 y.o. female  : 1990    MRN: [id number]  DATE: May 6, 2024  TIME: 7:07 PM    Assessment and Plan   Viral URI with cough [J06.9]  1. Viral URI with cough  ipratropium-albuterol (DUO-NEB) 0.5-2.5 mg/3 mL inhalation solution 3 mL    brompheniramine-pseudoephedrine-DM 30-2-10 MG/5ML syrup    fluticasone (FLONASE) 50 mcg/act nasal spray      2. Wheezing  ipratropium-albuterol (DUO-NEB) 0.5-2.5 mg/3 mL inhalation solution 3 mL      3. Sore throat  POCT rapid strepA      Symptoms most consistent with viral URI at this time.  Rapid strep negative.  Given DuoNeb due to feeling of tight breathing, she did feel like she was breathing more fully after this.  She does have albuterol inhaler.  Offered to have nurse demonstrate proper use, she plans to research this on her own.  Discussed expected improvement in the next few days, will return for recheck if she continues to worsen.      Patient Instructions   I am sending in a medication to help with the congestion and dry cough.  I am also sending in Flonase nasal spray to help reduce postnasal drip.  You can buy this over-the-counter if is not covered by insurance.  Your symptoms should start to turn around the next couple days, please come back to see us or your PCP if you are not starting to improve, having continued fevers or worsening of your symptoms.  You may try your albuterol inhaler as needed if you do feel wheezing or tightness with breathing.  If you feel any severe shortness of breath, please go to the ER or call 911.    Follow up with PCP in 3-5 days.  Proceed to  ER if symptoms worsen.    If tests have been performed at [facility], our office will contact you with results if changes need to be made to the care plan discussed with you at the visit.  [facility]    Chief Complaint     Chief Complaint   Patient presents with    Sore Throat    Cough    Cold Like  Symptoms    Headache     Patient states that sore throat and headache started on Thursday with congestion starting on Friday.          History of Present Illness       Patient presents for symptoms that started about 5 days ago.  She has had primarily sore throat also with throat and chest congestion.  Dry cough.  Fevers with last fever yesterday.  Taking NyQuil, DayQuil, Tylenol.  She has felt some times of wheezing and feeling more tight breathing.  It is hard for her to say if the tight breathing is different from her baseline as she has been diagnosed with asthma, has not been using inhalers much, she is unsure if she is using them correctly.  She notes that she did have a sick contact of her son who had essentially the same symptoms as her but his have already started to improve.  Home COVID test negative.    Sore Throat   Associated symptoms include coughing and headaches.   Cough  Associated symptoms include headaches and a sore throat.   Headache      Review of Systems   Review of Systems   HENT:  Positive for sore throat.    Respiratory:  Positive for cough.    Neurological:  Positive for headaches.   All other systems reviewed and are negative.        Current Medications       Current Outpatient Medications:     albuterol (PROVENTIL HFA,VENTOLIN HFA) 90 mcg/act inhaler, Inhale 2 puffs every 6 (six) hours as needed for shortness of breath, Disp: 18 g, Rfl: 1    brompheniramine-pseudoephedrine-DM 30-2-10 MG/5ML syrup, Take 5 mL by mouth 3 (three) times a day as needed for congestion or cough, Disp: 120 mL, Rfl: 0    fluticasone (FLONASE) 50 mcg/act nasal spray, 1 spray into each nostril daily, Disp: 11.1 mL, Rfl: 0    fluticasone (Flovent Diskus) 250 mcg/actuation diskus inhaler, Inhale 1 puff 2 (two) times a day Rinse mouth after use., Disp: 60 blister, Rfl: 2    meloxicam (Mobic) 15 mg tablet, Take 1 tablet (15 mg total) by mouth daily, Disp: 30 tablet, Rfl: 0    nicotine (NICODERM CQ) 21 mg/24 hr TD 24 hr  patch, Place 1 patch on the skin over 24 hours every 24 hours, Disp: 14 patch, Rfl: 2    predniSONE 10 mg tablet, Take 4 tablets for 2 days then decrease by 1 tablet every 2 days until you complete the course with 1 tablet for 2 days, Disp: 20 tablet, Rfl: 0    tiZANidine (ZANAFLEX) 2 mg tablet, Take 1 tablet (2 mg total) by mouth daily at bedtime as needed for muscle spasms, Disp: 30 tablet, Rfl: 1    meloxicam (MOBIC) 15 mg tablet, Take 1 tablet (15 mg total) by mouth daily as needed for moderate pain (Patient not taking: Reported on 4/11/2023), Disp: 30 tablet, Rfl: 1  No current facility-administered medications for this visit.    Current Allergies     Allergies as of 05/06/2024 - Reviewed 05/06/2024   Allergen Reaction Noted    No known allergies  10/17/2017            The following portions of the patient's history were reviewed and updated as appropriate: allergies, current medications, past family history, past medical history, past social history, past surgical history and problem list.     Past Medical History:   Diagnosis Date    Acne     Anxiety     Depression     Hypertension        No past surgical history on file.    Family History   Problem Relation Age of Onset    Cancer Father         non hodgkins lymphoma    Cancer Paternal Grandmother     Cancer Paternal Grandfather          Medications have been verified.        Objective   /55   Pulse 89   Temp 97.9 °F (36.6 °C)   Resp 18   Wt 66.7 kg (147 lb)   SpO2 98%   BMI 23.02 kg/m²   No LMP recorded.       Physical Exam     Physical Exam  Vitals and nursing note reviewed.   Constitutional:       General: She is not in acute distress.     Appearance: Normal appearance. She is not ill-appearing or toxic-appearing.   HENT:      Head: Normocephalic and atraumatic.      Right Ear: Tympanic membrane, ear canal and external ear normal.      Left Ear: Tympanic membrane, ear canal and external ear normal.      Nose: Congestion present.       Mouth/Throat:      Mouth: Mucous membranes are moist.      Comments: Mild erythema  PND and cobblestoning  Eyes:      Extraocular Movements: Extraocular movements intact.   Cardiovascular:      Rate and Rhythm: Normal rate and regular rhythm.      Heart sounds: Normal heart sounds.   Pulmonary:      Effort: Pulmonary effort is normal. No respiratory distress.      Breath sounds: No stridor. No wheezing, rhonchi or rales.      Comments: On initial auscultation, no wheezing, however reduced air movement, unclear if due to effort or tightness.  After DuoNeb, improved air movement  Musculoskeletal:         General: No swelling, tenderness or deformity.      Right lower leg: No edema.      Left lower leg: No edema.   Skin:     General: Skin is warm and dry.      Capillary Refill: Capillary refill takes less than 2 seconds.      Findings: No rash.   Neurological:      Mental Status: She is alert.      Gait: Gait normal.   Psychiatric:         Behavior: Behavior normal.

## 2024-05-30 DIAGNOSIS — F17.210 CIGARETTE NICOTINE DEPENDENCE WITHOUT COMPLICATION: Primary | ICD-10-CM

## 2024-05-30 RX ORDER — NICOTINE 21 MG/24HR
1 PATCH, TRANSDERMAL 24 HOURS TRANSDERMAL EVERY 24 HOURS
Qty: 14 PATCH | Refills: 2 | Status: SHIPPED | OUTPATIENT
Start: 2024-05-30

## 2024-05-31 ENCOUNTER — OFFICE VISIT (OUTPATIENT)
Dept: OBGYN CLINIC | Facility: CLINIC | Age: 34
End: 2024-05-31
Payer: MEDICARE

## 2024-05-31 VITALS
SYSTOLIC BLOOD PRESSURE: 124 MMHG | WEIGHT: 147 LBS | BODY MASS INDEX: 23.07 KG/M2 | DIASTOLIC BLOOD PRESSURE: 62 MMHG | HEIGHT: 67 IN

## 2024-05-31 DIAGNOSIS — M24.812 INTERNAL DERANGEMENT OF LEFT SHOULDER: Primary | ICD-10-CM

## 2024-05-31 PROCEDURE — 99213 OFFICE O/P EST LOW 20 MIN: CPT | Performed by: PHYSICIAN ASSISTANT

## 2024-05-31 NOTE — PROGRESS NOTES
"Patient Name:  Randee Leiva  MRN:  [id number]    Assessment & Plan     Persistent left shoulder pain.  Possible SLAP tear.  Patient notes no significant improvement with extensive conservative management including physical therapy and home exercises as well as meloxicam and activity modification.  She exhibits signs and symptoms consistent with a possible SLAP tear.  Due to persistent symptoms recommend MRI(3T) of the left shoulder for further evaluation.  In the meantime continue activity modification to avoid pain.  Follow-up after MRI with Dr. Brid    Chief Complaint     Follow-up left shoulder pain    History of the Present Illness     Randee Leiva is a 33 y.o. female who returns to the office today for follow-up regarding her left shoulder.  She was initially seen on 4/2/2024.  At that time she was referred to physical therapy and prescribed meloxicam.  A subacromial space corticosteroid injection was offered however patient declined at that time.  She returns to the office today noting no significant improvement with physical therapy, home exercises, and the meloxicam.  She still notes persistent pain localized to the anterior shoulder and describes the pain is deep within the shoulder.  Pain persist at 8 out of 10 in intensity.  Pain is constant and is worse with all activity.  See note subjective weakness and some subjective instability as well as popping and clicking in the shoulder.  She denies any numbness and tingling.  No fevers or chills.    Physical Exam     /62   Ht 5' 7\" (1.702 m)   Wt 66.7 kg (147 lb)   BMI 23.02 kg/m²     Left shoulder: No gross deformity.  There is tenderness anterior shoulder.  No tenderness lateral shoulder and posterior shoulder.  No tenderness AC joint. PROM is , ER-abd 90, IR-abd 50.  Impingement signs are positive.  Empty can test is mildly positive.  Speed's Test is positive.  Cross-body adduction test is negative.  Positive [place]'s test.  5 " out of 5 forward flexion strength.  Elbow range of motion is intact and full without pain.  Sensation intact median ulnar radial and axillary nerves.  2+ radial pulse.    Eyes: Anicteric sclerae.  ENT: Trachea midline.  Lungs: Normal respiratory effort.  CV: Capillary refill is less than 2 seconds.  Skin: Intact without erythema.  Lymph: No palpable lymphadenopathy.  Neuro: Sensation is grossly intact to light touch.  Psych: Mood and affect are appropriate.    Data Review     I have personally reviewed pertinent films in PACS, and my interpretation follows:    X-rays left shoulder 4/2/2024: No acute osseous abnormality. No fracture or dislocation. No significant degenerative changes.     Past Medical History:   Diagnosis Date    Acne     Anxiety     Depression     Hypertension        History reviewed. No pertinent surgical history.    Allergies   Allergen Reactions    No Known Allergies        Current Outpatient Medications on File Prior to Visit   Medication Sig Dispense Refill    albuterol (PROVENTIL HFA,VENTOLIN HFA) 90 mcg/act inhaler Inhale 2 puffs every 6 (six) hours as needed for shortness of breath 18 g 1    nicotine (NICODERM CQ) 14 mg/24hr TD 24 hr patch Place 1 patch on the skin over 24 hours every 24 hours 14 patch 2    brompheniramine-pseudoephedrine-DM 30-2-10 MG/5ML syrup Take 5 mL by mouth 3 (three) times a day as needed for congestion or cough (Patient not taking: Reported on 5/31/2024) 120 mL 0    fluticasone (FLONASE) 50 mcg/act nasal spray 1 spray into each nostril daily (Patient not taking: Reported on 5/31/2024) 11.1 mL 0    fluticasone (Flovent Diskus) 250 mcg/actuation diskus inhaler Inhale 1 puff 2 (two) times a day Rinse mouth after use. (Patient not taking: Reported on 5/31/2024) 60 blister 2    meloxicam (MOBIC) 15 mg tablet Take 1 tablet (15 mg total) by mouth daily as needed for moderate pain (Patient not taking: Reported on 4/11/2023) 30 tablet 1    meloxicam (Mobic) 15 mg tablet Take  1 tablet (15 mg total) by mouth daily (Patient not taking: Reported on 5/31/2024) 30 tablet 0    predniSONE 10 mg tablet Take 4 tablets for 2 days then decrease by 1 tablet every 2 days until you complete the course with 1 tablet for 2 days 20 tablet 0    tiZANidine (ZANAFLEX) 2 mg tablet Take 1 tablet (2 mg total) by mouth daily at bedtime as needed for muscle spasms 30 tablet 1     No current facility-administered medications on file prior to visit.       Social History     Tobacco Use    Smoking status: Heavy Smoker     Current packs/day: 0.50     Average packs/day: 0.5 packs/day for 1.2 years (0.6 ttl pk-yrs)     Types: Cigarettes    Smokeless tobacco: Never    Tobacco comments:     8 cigarettes per day, No passive smoke exposure   Vaping Use    Vaping status: Never Used   Substance Use Topics    Alcohol use: Not Currently    Drug use: Never       Family History   Problem Relation Age of Onset    Cancer Father         non hodgkins lymphoma    Cancer Paternal Grandmother     Cancer Paternal Grandfather        Review of Systems     As stated in the HPI. All other systems reviewed and are negative.

## 2024-06-01 DIAGNOSIS — M79.18 MYOFASCIAL PAIN SYNDROME: ICD-10-CM

## 2024-06-03 RX ORDER — TIZANIDINE 2 MG/1
2 TABLET ORAL
Qty: 30 TABLET | Refills: 1 | Status: SHIPPED | OUTPATIENT
Start: 2024-06-03 | End: 2024-06-06

## 2024-06-05 NOTE — PROGRESS NOTES
Assessment:  1. Left leg pain    2. Lumbar radiculopathy    3. Chronic left hip pain        Plan:  Patient given referral to chiropractic therapy for left hip and lower extremity symptoms.  If no relief after 6 weeks of conservative treatment, may consider updating MRI lumbar spine  May consider left piriformis injection  Patient may continue Tylenol and over-the-counter NSAIDs as needed  Follow-up in 6 to 8 weeks or sooner if needed    History of Present Illness:    The patient is a 33 y.o. female last seen on 3/23/2023 who presents for a follow up office visit in regards to chronic pain that radiates from the left buttock into the groin and posterior aspect of the left lower extremity to the ankle with associated numbness and paresthesias.  Denies any significant low back pain, right lower extremity symptoms, bowel or bladder incontinence or saddle anesthesia.  Patient did have an MRI lumbar spine in 2022 which showed a disc bulge at L4-5 without any central or foraminal stenosis.  All of her levels were unremarkable.  She also completed an EMG of the left lower extremity which was normal.  She has completed physical therapy in the past which somewhat improved her symptoms however pain has been worsening recently.  She has not found relief with NSAIDs and experiences side effects with muscle relaxants and gabapentin.  She has not found any long-term relief with oral steroids.  She did have a left SI joint injection May 31, 2023 which did improve low back pain    Patient rates her pain a 10 out of 10 on the numeric pain rating scale.  Pain is constant throughout the day and is described as burning, dull aching, sharp, throbbing, cramping, pressure-like, shooting, numbness and pins-and-needles    I have personally reviewed and/or updated the patient's past medical history, past surgical history, family history, social history, current medications, allergies, and vital signs today.       Review of Systems:    Review  of Systems      Past Medical History:   Diagnosis Date    Acne     Anxiety     Depression     Hypertension        No past surgical history on file.    Family History   Problem Relation Age of Onset    Cancer Father         non hodgkins lymphoma    Cancer Paternal Grandmother     Cancer Paternal Grandfather        Social History     Occupational History    Occupation: NOT EMPLOYED   Tobacco Use    Smoking status: Heavy Smoker     Current packs/day: 0.50     Average packs/day: 0.5 packs/day for 1.2 years (0.6 ttl pk-yrs)     Types: Cigarettes    Smokeless tobacco: Never    Tobacco comments:     8 cigarettes per day, No passive smoke exposure   Vaping Use    Vaping status: Never Used   Substance and Sexual Activity    Alcohol use: Not Currently    Drug use: Never    Sexual activity: Yes     Partners: Male         Current Outpatient Medications:     albuterol (PROVENTIL HFA,VENTOLIN HFA) 90 mcg/act inhaler, Inhale 2 puffs every 6 (six) hours as needed for shortness of breath, Disp: 18 g, Rfl: 1    brompheniramine-pseudoephedrine-DM 30-2-10 MG/5ML syrup, Take 5 mL by mouth 3 (three) times a day as needed for congestion or cough (Patient not taking: Reported on 5/31/2024), Disp: 120 mL, Rfl: 0    fluticasone (FLONASE) 50 mcg/act nasal spray, 1 spray into each nostril daily (Patient not taking: Reported on 5/31/2024), Disp: 11.1 mL, Rfl: 0    fluticasone (Flovent Diskus) 250 mcg/actuation diskus inhaler, Inhale 1 puff 2 (two) times a day Rinse mouth after use. (Patient not taking: Reported on 5/31/2024), Disp: 60 blister, Rfl: 2    meloxicam (MOBIC) 15 mg tablet, Take 1 tablet (15 mg total) by mouth daily as needed for moderate pain (Patient not taking: Reported on 4/11/2023), Disp: 30 tablet, Rfl: 1    nicotine (NICODERM CQ) 14 mg/24hr TD 24 hr patch, Place 1 patch on the skin over 24 hours every 24 hours, Disp: 14 patch, Rfl: 2    predniSONE 10 mg tablet, Take 4 tablets for 2 days then decrease by 1 tablet every 2 days  "until you complete the course with 1 tablet for 2 days (Patient not taking: Reported on 6/6/2024), Disp: 20 tablet, Rfl: 0    Allergies   Allergen Reactions    No Known Allergies        Physical Exam:    /73   Pulse 87   Ht 5' 7\" (1.702 m)   Wt 76.7 kg (169 lb)   BMI 26.47 kg/m²     Constitutional:normal, well developed, well nourished, alert, in no distress and non-toxic and no overt pain behavior.  Eyes:anicteric  HEENT:grossly intact  Neck:supple, symmetric, trachea midline and no masses   Pulmonary:even and unlabored  Cardiovascular:No edema or pitting edema present  Skin:Normal without rashes or lesions and well hydrated  Psychiatric:Mood and affect appropriate  Neurologic:Cranial Nerves II-XII grossly intact  Musculoskeletal:normal gait.  Bilateral lumbar paraspinal musculature nontender to palpation.  Bilateral lower extremity strength 5 out of 5 in all muscle groups.  Left trochanteric bursa nontender to palpation.  Positive straight leg raise on the left and negative on the right.  Negative Haseeb's test bilaterally. Internal rotation of the left hip reproduces groin pain however external rotation does not.  Negative Stinchfield test on the left      Imaging  No orders to display     MRI LUMBAR SPINE WITHOUT CONTRAST     INDICATION: R20.2: Paresthesia of skin.   Right-sided weakness.  History of fall.     COMPARISON:  None.     TECHNIQUE:  Sagittal T1, sagittal T2, sagittal inversion recovery, axial T1 and axial T2, coronal T2.    IMAGE QUALITY:  Diagnostic     FINDINGS:     VERTEBRAL BODIES:  There is a transitional lumbosacral junction.  The L5 vertebral body has an elongated left transverse process articulating with the sacral base.  Normal alignment of the lumbar spine.  No spondylolysis or spondylolisthesis. No   scoliosis.  No compression fracture.    Normal marrow signal is identified within the visualized bony structures.  No discrete marrow lesion.     SACRUM:  Normal signal within the " sacrum. No evidence of insufficiency or stress fracture.     DISTAL CORD AND CONUS:  Normal size and signal within the distal cord and conus.     PARASPINAL SOFT TISSUES:  Paraspinal soft tissues are unremarkable.     LOWER THORACIC DISC SPACES:  Normal disc height and signal.  No disc herniation, canal stenosis or foraminal narrowing.     LUMBAR DISC SPACES:     L1-L2:  Normal.     L2-L3:  Normal.     L3-L4:  Normal.     L4-L5:  Minimal annular bulge without saline central or foraminal narrowing.     L5-S1:  Normal.     IMPRESSION:     No central or foraminal narrowing.  Minimal annular bulge at L4-5.    Orders Placed This Encounter   Procedures    Ambulatory Referral to Chiropractic

## 2024-06-06 ENCOUNTER — OFFICE VISIT (OUTPATIENT)
Dept: PAIN MEDICINE | Facility: CLINIC | Age: 34
End: 2024-06-06
Payer: MEDICARE

## 2024-06-06 VITALS
WEIGHT: 169 LBS | BODY MASS INDEX: 26.53 KG/M2 | SYSTOLIC BLOOD PRESSURE: 111 MMHG | DIASTOLIC BLOOD PRESSURE: 73 MMHG | HEART RATE: 87 BPM | HEIGHT: 67 IN

## 2024-06-06 DIAGNOSIS — M25.552 CHRONIC LEFT HIP PAIN: ICD-10-CM

## 2024-06-06 DIAGNOSIS — M79.605 LEFT LEG PAIN: Primary | ICD-10-CM

## 2024-06-06 DIAGNOSIS — G89.29 CHRONIC LEFT HIP PAIN: ICD-10-CM

## 2024-06-06 DIAGNOSIS — M54.16 LUMBAR RADICULOPATHY: ICD-10-CM

## 2024-06-06 PROBLEM — M79.604 RIGHT LEG PAIN: Status: ACTIVE | Noted: 2024-06-06

## 2024-06-06 PROCEDURE — 99214 OFFICE O/P EST MOD 30 MIN: CPT | Performed by: NURSE PRACTITIONER

## 2024-06-12 ENCOUNTER — TELEPHONE (OUTPATIENT)
Dept: NEUROLOGY | Facility: CLINIC | Age: 34
End: 2024-06-12

## 2024-06-12 NOTE — TELEPHONE ENCOUNTER
LMOM to confirm pt's appt w/ Brea Choudhary in [facility] on 6/20/24 at 1:30. Advised pt to arrive 15 minutes prior to check in with the .

## 2024-06-18 ENCOUNTER — HOSPITAL ENCOUNTER (OUTPATIENT)
Dept: RADIOLOGY | Age: 34
Discharge: HOME | End: 2024-06-18
Payer: MEDICARE

## 2024-06-18 DIAGNOSIS — M24.812 INTERNAL DERANGEMENT OF LEFT SHOULDER: ICD-10-CM

## 2024-06-18 PROCEDURE — 73221 MRI JOINT UPR EXTREM W/O DYE: CPT

## 2024-06-19 ENCOUNTER — OFFICE VISIT (OUTPATIENT)
Dept: OBGYN CLINIC | Facility: CLINIC | Age: 34
End: 2024-06-19
Payer: MEDICARE

## 2024-06-19 ENCOUNTER — PREP FOR PROCEDURE (OUTPATIENT)
Dept: OBGYN CLINIC | Facility: CLINIC | Age: 34
End: 2024-06-19

## 2024-06-19 VITALS
SYSTOLIC BLOOD PRESSURE: 110 MMHG | HEIGHT: 67 IN | DIASTOLIC BLOOD PRESSURE: 68 MMHG | BODY MASS INDEX: 26.53 KG/M2 | WEIGHT: 169 LBS

## 2024-06-19 DIAGNOSIS — G56.01 CARPAL TUNNEL SYNDROME OF RIGHT WRIST: Primary | ICD-10-CM

## 2024-06-19 PROCEDURE — 99214 OFFICE O/P EST MOD 30 MIN: CPT | Performed by: SURGERY

## 2024-06-19 RX ORDER — SODIUM CHLORIDE, SODIUM LACTATE, POTASSIUM CHLORIDE, CALCIUM CHLORIDE 600; 310; 30; 20 MG/100ML; MG/100ML; MG/100ML; MG/100ML
125 INJECTION, SOLUTION INTRAVENOUS CONTINUOUS
OUTPATIENT
Start: 2024-06-19

## 2024-06-19 NOTE — PROGRESS NOTES
ORTHOPAEDIC HAND, WRIST, AND ELBOW OFFICE  VISIT       ASSESSMENT/PLAN:      32 y.o. female with bilateral hand numbness R > L as right side is constant in her whole extremity    We discussed that since the injection helped, carpal tunnel release can be beneficial   Recommend surgery over another injection  All of the risks and benefits of operative treatment were explained to the patient, as well as the risks and benefits of any alternative treatment options, including nonoperative care. This risks of surgery include, but are not limited to, infection, bleeding, blood clot, damage to nerves/arteries, need for further surgery, cardiovascular risk, anesthesia risk, and continued pain.  The patient understood this and elects to proceed forward with surgical intervention.  Right carpal tunnel release  Consent obtained  Pt would prefer sedation        Diagnoses and all orders for this visit:    Carpal tunnel syndrome of right wrist  -     Case request operating room: RELEASE CARPAL TUNNEL, RIGHT; Standing  -     Case request operating room: RELEASE CARPAL TUNNEL, RIGHT    Other orders  -     Nursing Communication Warmimg Interventions Implemented; Standing  -     Nursing Communication CHG bath, have staff wash entire body (neck down) per pre-op bathing protocol. Routine, evening prior to, and day of surgery.; Standing  -     Void on call to OR; Standing  -     Insert peripheral IV; Standing  -     Diet NPO; Sips with meds; Standing  -     lactated ringers infusion        Follow Up:  Return for Post-Op, sutures removed.    To Do Next Visit:  Re-evaluation of current issue                                                                                                                             ____________________________________________________________________________________________________________________________________________      CHIEF COMPLAINT:  Chief Complaint   Patient presents with    Right Wrist - Follow-up,  Numbness       SUBJECTIVE:  Randee Leiva is a 33 y.o. year old RHD female who presents for initial evaluation of right carpal tunnel syndrome.   She was provied a CTR injeciton into her Right wrist 5/16/2023 and she states it helped for a few months . She states recently she started to get numbness and tingling returning as well as pain into her hand. She states her index, middle and ring fingers are affected.  She is wondering if she can have another injection today    I have personally reviewed all the relevant PMH, PSH, SH, FH, Medications and allergies      PAST MEDICAL HISTORY:  Past Medical History:   Diagnosis Date    Acne     Anxiety     Depression     Hypertension        PAST SURGICAL HISTORY:  No past surgical history on file.    FAMILY HISTORY:  Family History   Problem Relation Age of Onset    Cancer Father         non hodgkins lymphoma    Cancer Paternal Grandmother     Cancer Paternal Grandfather        SOCIAL HISTORY:  Social History     Tobacco Use    Smoking status: Heavy Smoker     Current packs/day: 0.50     Average packs/day: 0.5 packs/day for 1.2 years (0.6 ttl pk-yrs)     Types: Cigarettes    Smokeless tobacco: Never    Tobacco comments:     8 cigarettes per day, No passive smoke exposure   Vaping Use    Vaping status: Never Used   Substance Use Topics    Alcohol use: Not Currently    Drug use: Never       MEDICATIONS:    Current Outpatient Medications:     albuterol (PROVENTIL HFA,VENTOLIN HFA) 90 mcg/act inhaler, Inhale 2 puffs every 6 (six) hours as needed for shortness of breath, Disp: 18 g, Rfl: 1    nicotine (NICODERM CQ) 14 mg/24hr TD 24 hr patch, Place 1 patch on the skin over 24 hours every 24 hours, Disp: 14 patch, Rfl: 2    brompheniramine-pseudoephedrine-DM 30-2-10 MG/5ML syrup, Take 5 mL by mouth 3 (three) times a day as needed for congestion or cough (Patient not taking: Reported on 5/31/2024), Disp: 120 mL, Rfl: 0    fluticasone (FLONASE) 50 mcg/act nasal spray, 1 spray into  "each nostril daily (Patient not taking: Reported on 5/31/2024), Disp: 11.1 mL, Rfl: 0    fluticasone (Flovent Diskus) 250 mcg/actuation diskus inhaler, Inhale 1 puff 2 (two) times a day Rinse mouth after use. (Patient not taking: Reported on 5/31/2024), Disp: 60 blister, Rfl: 2    meloxicam (MOBIC) 15 mg tablet, Take 1 tablet (15 mg total) by mouth daily as needed for moderate pain (Patient not taking: Reported on 4/11/2023), Disp: 30 tablet, Rfl: 1    predniSONE 10 mg tablet, Take 4 tablets for 2 days then decrease by 1 tablet every 2 days until you complete the course with 1 tablet for 2 days (Patient not taking: Reported on 6/6/2024), Disp: 20 tablet, Rfl: 0    ALLERGIES:  Allergies   Allergen Reactions    No Known Allergies        REVIEW OF SYSTEMS:  Review of Systems   Constitutional:  Negative for chills and fever.   HENT:  Negative for ear pain and sore throat.    Eyes:  Negative for pain and visual disturbance.   Respiratory:  Negative for cough and shortness of breath.    Cardiovascular:  Negative for chest pain and palpitations.   Gastrointestinal:  Negative for abdominal pain and vomiting.   Genitourinary:  Negative for dysuria and hematuria.   Musculoskeletal:  Negative for arthralgias and back pain.   Skin:  Negative for color change and rash.   Neurological:  Positive for numbness. Negative for seizures and syncope.   All other systems reviewed and are negative.      VITALS:  Vitals:    06/19/24 1429   BP: 110/68       LABS:  HgA1c: No results found for: \"HGBA1C\"  BMP:   Lab Results   Component Value Date    CALCIUM 8.9 10/24/2022    K 4.4 10/24/2022    CO2 24 10/24/2022     (H) 10/24/2022    BUN 13 10/24/2022    CREATININE 0.71 10/24/2022       _____________________________________________________  PHYSICAL EXAMINATION:  General: well developed and well nourished, alert, oriented times 3 and appears comfortable  Psychiatric: Normal  HEENT: Normocephalic, Atraumatic Trachea Midline, No " torticollis  Pulmonary: No audible wheezing or respiratory distress   Abdomen/GI: Non tender, non distended   Cardiovascular: No pitting edema, 2+ radial pulse   Skin: No masses, erythema, lacerations, fluctation, ulcerations  Neurovascular: Sensation Intact to the Median, Ulnar, Radial Nerve, Motor Intact to the Median, Ulnar, Radial Nerve and Pulses Intact  Musculoskeletal: Normal, except as noted in detailed exam and in HPI.      MUSCULOSKELETAL EXAMINATION:    Bilateral Carpal Tunnel Exam: 5/16/2023    Positive thenar atrophy. Positive phalen's test. Positive carpal tunnel compression. Positive tinels over median nerve at the wrist.  Opposition strength 5/5.  Abduction strength 5/5.      2 point discrimination is 5 mm throughout.      ___________________________________________________  STUDIES REVIEWED:  EMG 1 limb - right arm - 9/10/21:  Impression:   Abnormal study. These electrodiagnostic findings are most consistent with a mild, median mononeuropathy across the right wrist (carpal tunnel syndrome).         PROCEDURES PERFORMED:  Procedures  No Procedures performed today    _____________________________________________________      Scribe Attestation      I,:  Kalin Quintana am acting as a scribe while in the presence of the attending physician.:       I,:  Deshawn Mena MD personally performed the services described in this documentation    as scribed in my presence.:

## 2024-07-01 ENCOUNTER — CONSULT (OUTPATIENT)
Age: 34
End: 2024-07-01
Payer: MEDICARE

## 2024-07-01 VITALS
BODY MASS INDEX: 26.53 KG/M2 | HEART RATE: 82 BPM | DIASTOLIC BLOOD PRESSURE: 72 MMHG | WEIGHT: 169 LBS | SYSTOLIC BLOOD PRESSURE: 116 MMHG | HEIGHT: 67 IN | OXYGEN SATURATION: 98 %

## 2024-07-01 DIAGNOSIS — M54.2 NECK PAIN: ICD-10-CM

## 2024-07-01 DIAGNOSIS — M54.59 MECHANICAL LOW BACK PAIN: ICD-10-CM

## 2024-07-01 DIAGNOSIS — M54.16 LUMBAR RADICULOPATHY: ICD-10-CM

## 2024-07-01 DIAGNOSIS — M99.01 SEGMENTAL DYSFUNCTION OF CERVICAL REGION: ICD-10-CM

## 2024-07-01 DIAGNOSIS — M99.02 SEGMENTAL DYSFUNCTION OF THORACIC REGION: ICD-10-CM

## 2024-07-01 DIAGNOSIS — M99.04 SEGMENTAL DYSFUNCTION OF SACRAL REGION: ICD-10-CM

## 2024-07-01 DIAGNOSIS — G89.29 CHRONIC LEFT HIP PAIN: ICD-10-CM

## 2024-07-01 DIAGNOSIS — M99.03 SEGMENTAL DYSFUNCTION OF LUMBAR REGION: Primary | ICD-10-CM

## 2024-07-01 DIAGNOSIS — M25.552 CHRONIC LEFT HIP PAIN: ICD-10-CM

## 2024-07-01 PROCEDURE — 99243 OFF/OP CNSLTJ NEW/EST LOW 30: CPT | Performed by: CHIROPRACTOR

## 2024-07-01 PROCEDURE — 98942 CHIROPRACTIC MANJ 5 REGIONS: CPT | Performed by: CHIROPRACTOR

## 2024-07-01 NOTE — PROGRESS NOTES
Initial date of service: 7/1/24    Diagnoses and all orders for this visit:    Segmental dysfunction of lumbar region    Mechanical low back pain    Lumbar radiculopathy  -     Ambulatory Referral to Chiropractic    Chronic left hip pain  -     Ambulatory Referral to Chiropractic    Segmental dysfunction of sacral region    Neck pain    Segmental dysfunction of thoracic region    Segmental dysfunction of cervical region       ASSESSMENT:  No red flags, radiculopathy or neurologic deficit appreciated clinically. Pt's symptoms and exam findings consistent with mechanical lbp complicated by mechanical neck pain secondary to repetitive st/sp injury, exacerbated by postural/ergonomic stressors. Pt responded well to flexion biased stretches and manual mobilization of the affected spinal and myofascial tissues with increased ROM; trial of conservative tx recommended consisting of stretching, graded mobilization/manipulation of the affected spinal and myofascial jt dysfunction, postural/ergonomic education and take home stretches/exercises. If symptoms fail to improve with short trial of conservative care, appropriate imaging and referral will be coordinated.  Spent greater than 30 min c pt discussing hx, pe, ddx, tx options and reviewing notes/imaging    PROCEDURE CODES: 04901, 68812    TREATMENT:  Fear avoidance behavior discussion; encouraged and reassured pt that natural course of condition is to improve over time with adherence to tx plan and home care strategies. Home care recommendations: avoid bed rest, walk (but avoid trails and uneven surfaces), gradual return to activity to tolerance (avoid anything that peripheralizes symptoms), call if symptoms peripheralize, worsen, or neurologic deficit progresses. Ther-ex: IASTM; discussed post procedure soreness and/or ecchymosis for up to 36 hrs, applied to affected mm hypertonicities; supine hamstring stretch, supine gluteal stretch, side laying QL stretch, single knee to  chest stretch, hip flexor pin-and-stretch, alternating prone hip extension, glute bridge, transitional mvmt education, abdominal bracing; greater than 15 min spent performing above mentioned ther-ex to improve ROM/flexibility. Cervical Manual traction, Thoracic mobilization/manipulation: prone P-A mob; Lumbar mobilization/manipulation: diversified side laying graded HVLA, flexion-traction; Sacrum- Long axis traction SIJ Manipulation/Mobilization: L SIJ HVLA - long axis distraction, marie drop table maneuver to affected SIJ    HPI:  Randee Leiva is a 33 y.o. female  Chief Complaint   Patient presents with   • Neck Pain     L side   • Back Pain     L side   • Leg Pain     L leg     The patient presents to the office with L leg and lower back pain, the patient reports the pain started about a year ago without trauma but mentions she had a job for three years where it was L side dominant. The patient reports the L side is throbbing and pain but the R side is numbness. Went to Pain management and injection helped for two months but then the pain came back worse. The patient was referred to our office by CINTHYA Andino, Other previous treatment includes PT and massage in the past but helped a little temporarily. The patient exercises- 1 mile every day, gym- all the machines- whatever she likes.  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 Pt also mentions she has neck pain and shoulder issue, but Ortho is treating shoulder. MRI 2022 Lumbar- Mild disc bulge L4-5 without central canal or foraminal narrowing.     Neck Pain   Associated symptoms include leg pain. Pertinent negatives include no chest pain, fever, headaches, numbness or weakness.   Back Pain  Associated symptoms include leg pain. Pertinent negatives include no chest pain, fever,  headaches, numbness or weakness.   Leg Pain   Pertinent negatives include no numbness.     Past Medical History:   Diagnosis Date   • Acne    • Anxiety    • Depression    • Hypertension       History reviewed. No pertinent surgical history.  The following portions of the patient's history were reviewed and updated as appropriate: allergies, past family history, past medical history, past social history, past surgical history, and problem list.  Review of Systems   Constitutional:  Negative for activity change, fatigue, fever and unexpected weight change.   HENT:  Negative for ear pain, hearing loss, sinus pressure, sinus pain, sore throat and tinnitus.    Respiratory:  Negative for chest tightness, shortness of breath, wheezing and stridor.    Cardiovascular:  Negative for chest pain.   Genitourinary:  Negative for flank pain and frequency.   Musculoskeletal:  Positive for back pain, myalgias, neck pain and neck stiffness. Negative for joint swelling.   Skin:  Negative for color change and pallor.   Neurological:  Negative for dizziness, speech difficulty, weakness, numbness and headaches.   Psychiatric/Behavioral:  Negative for agitation and sleep disturbance. The patient is not nervous/anxious.      Physical Exam  Constitutional:       General: She is not in acute distress.     Appearance: Normal appearance.   HENT:      Head: Normocephalic.      Mouth/Throat:      Mouth: Mucous membranes are moist.   Eyes:      Extraocular Movements: Extraocular movements intact.      Conjunctiva/sclera: Conjunctivae normal.      Pupils: Pupils are equal, round, and reactive to light.   Neck:      Vascular: No carotid bruit.   Pulmonary:      Effort: Pulmonary effort is normal.   Chest:      Chest wall: No tenderness.   Abdominal:      General: Abdomen is flat.      Palpations: Abdomen is soft.   Musculoskeletal:         General: Tenderness present. No swelling, deformity or signs of injury. Normal range of motion.      Cervical  back: Normal range of motion. Rigidity and tenderness present.        Back:       Right lower leg: No edema.      Left lower leg: No edema.        Legs:    Lymphadenopathy:      Cervical: No cervical adenopathy.   Skin:     General: Skin is warm.      Coloration: Skin is not jaundiced or pale.      Findings: No bruising or erythema.   Neurological:      Mental Status: She is alert and oriented to person, place, and time.      Cranial Nerves: No cranial nerve deficit.      Sensory: No sensory deficit.      Motor: No weakness.      Gait: Gait is intact.      Deep Tendon Reflexes: Reflexes are normal and symmetric.   Psychiatric:         Attention and Perception: Attention normal.         Mood and Affect: Mood and affect normal.         Speech: Speech normal.         Behavior: Behavior normal. Behavior is cooperative.         Thought Content: Thought content normal.         Cognition and Memory: Cognition normal.         Judgment: Judgment normal.       SOFT TISSUE ASSESSMENT Hypertonicity and tenderness palpated B C6-T1, Upper trap, lev scap, SCM  T10-S1 erector spinae, hip flexor, glute med/min, QL, hamstring JOINT RESTRICTIONS: C6-T1,  T10-S1 and L SIJ ORTHO: SI jt point tenderness: +; Brook unremarkable for centralization/peripheralization; flaco's, iliac compression, thigh thrust elicit lbp in R/L SIJ; prone femoral nerve stretch neg for upper lumbar neural tension, elicits L SIJ stiffness; sitting root elicits no lbp on R/L; slump test elicits no neural tension R/L, Spurlings- neg, Distraction- neg, Max foraminal compression- neg    Return in about 1 week (around 7/8/2024) for Recheck.

## 2024-07-08 DIAGNOSIS — F17.210 CIGARETTE NICOTINE DEPENDENCE WITHOUT COMPLICATION: Primary | ICD-10-CM

## 2024-07-08 RX ORDER — NICOTINE 21 MG/24HR
1 PATCH, TRANSDERMAL 24 HOURS TRANSDERMAL EVERY 24 HOURS
Qty: 14 PATCH | Refills: 2 | Status: SHIPPED | OUTPATIENT
Start: 2024-07-08

## 2024-07-22 ENCOUNTER — OFFICE VISIT (OUTPATIENT)
Dept: OBGYN CLINIC | Facility: MEDICAL CENTER | Age: 34
End: 2024-07-22
Payer: MEDICARE

## 2024-07-22 VITALS
BODY MASS INDEX: 26.84 KG/M2 | WEIGHT: 171 LBS | HEART RATE: 58 BPM | HEIGHT: 67 IN | DIASTOLIC BLOOD PRESSURE: 72 MMHG | SYSTOLIC BLOOD PRESSURE: 108 MMHG

## 2024-07-22 DIAGNOSIS — M75.22 BICEPS TENDINITIS OF LEFT UPPER EXTREMITY: ICD-10-CM

## 2024-07-22 DIAGNOSIS — M75.42 IMPINGEMENT SYNDROME OF LEFT SHOULDER: Primary | ICD-10-CM

## 2024-07-22 PROCEDURE — 99213 OFFICE O/P EST LOW 20 MIN: CPT | Performed by: ORTHOPAEDIC SURGERY

## 2024-07-22 NOTE — PROGRESS NOTES
Ortho Sports Medicine Shoulder New Patient Visit     Assesment:   33 y.o. female {RIGHT/LEFT:20294} shoulder {shoulder diagnosis:18630}    Plan:    Conservative treatment:    {conservative shoulder:83514}      Imaging:    {imaging shoulder:67974}      Injection:    {injection shoulder:44946}      Surgery:     {surgery shoulder:54100}      Follow up:    No follow-ups on file.        Chief Complaint   Patient presents with    Left Shoulder - Pain       History of Present Illness:    The patient is a 33 y.o., {RIGHT/LEFT:20294} hand dominant female whose occupation is {Occupation social history md:62943}, referred to me by {PCPERURGENT:26390}, seen in clinic for {consultation evalulation:28274} of {RIGHT/LEFT:20294} shoulder pain.      The patient {has/denies:61250} a history of diabetes.  The patient {has/denies:53099} a history of thyroid disorder.      Pain is located {Desc; anterior/posterior:90708}.  The patient rates the pain as a {gen number 0-10:320827}/10.  The pain has been present for {1-10,15,20,30:34228} {length of pain:15293}.      The patient sustained an injury on ***.  The mechanism of injury was {Mechanism of shoulder:26653}. The pain is characterized as {Desc; dull;sharp;burning;achy:67329}.  The pain is present {pain present time:17612}.      Pain is improved by {resticemedication:95771}.  Pain is aggravated by {shoulder aggravated:82423}.    Symptoms include {knee symptoms:01156}.    The patient {has/denies:64471} weakness.  The patient {has/denies:02924} numbness and tingling.     The patient has tried {resticemedication:47507}.          Shoulder Surgical History:  {none surgery:47920}    Past Medical, Social and Family History:  Past Medical History:   Diagnosis Date    Acne     Anxiety     Depression     Hypertension      History reviewed. No pertinent surgical history.  Allergies   Allergen Reactions    No Known Allergies      Current Outpatient Medications on File Prior to Visit   Medication  Sig Dispense Refill    albuterol (PROVENTIL HFA,VENTOLIN HFA) 90 mcg/act inhaler Inhale 2 puffs every 6 (six) hours as needed for shortness of breath 18 g 1    nicotine (NICODERM CQ) 14 mg/24hr TD 24 hr patch Place 1 patch on the skin over 24 hours every 24 hours 14 patch 2    nicotine (NICODERM CQ) 21 mg/24 hr TD 24 hr patch Place 1 patch on the skin over 24 hours every 24 hours 14 patch 2    brompheniramine-pseudoephedrine-DM 30-2-10 MG/5ML syrup Take 5 mL by mouth 3 (three) times a day as needed for congestion or cough (Patient not taking: Reported on 5/31/2024) 120 mL 0    fluticasone (FLONASE) 50 mcg/act nasal spray 1 spray into each nostril daily (Patient not taking: Reported on 5/31/2024) 11.1 mL 0    fluticasone (Flovent Diskus) 250 mcg/actuation diskus inhaler Inhale 1 puff 2 (two) times a day Rinse mouth after use. (Patient not taking: Reported on 5/31/2024) 60 blister 2    meloxicam (MOBIC) 15 mg tablet Take 1 tablet (15 mg total) by mouth daily as needed for moderate pain (Patient not taking: Reported on 4/11/2023) 30 tablet 1    predniSONE 10 mg tablet Take 4 tablets for 2 days then decrease by 1 tablet every 2 days until you complete the course with 1 tablet for 2 days (Patient not taking: Reported on 6/6/2024) 20 tablet 0     No current facility-administered medications on file prior to visit.     Social History     Socioeconomic History    Marital status: Single     Spouse name: Not on file    Number of children: Not on file    Years of education: Not on file    Highest education level: Not on file   Occupational History    Occupation: NOT EMPLOYED   Tobacco Use    Smoking status: Heavy Smoker     Current packs/day: 0.50     Average packs/day: 0.5 packs/day for 1.2 years (0.6 ttl pk-yrs)     Types: Cigarettes    Smokeless tobacco: Never    Tobacco comments:     8 cigarettes per day, No passive smoke exposure   Vaping Use    Vaping status: Never Used   Substance and Sexual Activity    Alcohol use: Not  "Currently    Drug use: Never    Sexual activity: Yes     Partners: Male   Other Topics Concern    Not on file   Social History Narrative    Not on file     Social Determinants of Health     Financial Resource Strain: Not on file   Food Insecurity: Not on file   Transportation Needs: Not on file   Physical Activity: Not on file   Stress: Not on file   Social Connections: Not on file   Intimate Partner Violence: Not on file   Housing Stability: Not on file         I have reviewed the past medical, surgical, social and family history, medications and allergies as documented in the EMR.    Review of systems: ROS is negative other than that noted in the HPI.  Constitutional: Negative for fatigue and fever.   HENT: Negative for sore throat.    Respiratory: Negative for shortness of breath.    Cardiovascular: Negative for chest pain.   Gastrointestinal: Negative for abdominal pain.   Endocrine: Negative for cold intolerance and heat intolerance.   Genitourinary: Negative for flank pain.   Musculoskeletal: Negative for back pain.   Skin: Negative for rash.   Allergic/Immunologic: Negative for immunocompromised state.   Neurological: Negative for dizziness.   Psychiatric/Behavioral: Negative for agitation.      Physical Exam:    Blood pressure 108/72, pulse 58, height 5' 7\" (1.702 m), weight 77.6 kg (171 lb).    General/Constitutional: NAD, well developed, well nourished  HENT: Normocephalic, atraumatic  CV: Intact distal pulses, regular rate  Resp: No respiratory distress or labored breathing  GI: Soft and non-tender   Lymphatic: No lymphadenopathy palpated  Neuro: Alert and Oriented x 3, no focal deficits  Psych: Normal mood, normal affect, normal judgement, normal behavior  Skin: Warm, dry, no rashes, no erythema      Shoulder focused exam:       RIGHT LEFT    Scapula Atrophy Negative Negative     Winging Negative Negative     Protraction Negative Negative    Rotator cuff SS 5/5 5/5     IS 5/5 5/5     SubS 5/5 5/5    ROM "     170     ER0 60 60     ER90 90    90     IR90 T6    T6     IRb T6    T6    TTP: AC Negative Positive       Biceps Negative Positive     Coracoid Negative Positive    Special Tests: O'Briens Negative Positive     Antunez-shear Negative Positive     Cross body Adduction Negative Negative     Speeds  Negative Positive     Mirella's Negative Negative     Whipple Negative Negative       Neer Negative Positive     Freedman Negative Negative    Instability: Apprehension & relocation not tested not tested     Load & shift not tested not tested    Other: Crank Negative Negative                 UE NV Exam: +2 Radial pulses bilaterally  Sensation intact to light touch C5-T1 bilaterally, Radial/median/ulnar nerve motor intact      Bilateral elbow, wrist, and and forearm ROM full, painless with passive ROM, no ttp or crepitance throughout extremities below shoulder joint    Cervical ROM is full without pain, numbness or tingling      Shoulder Imaging    X-rays of the {RIGHT/LEFT:20294} shoulder were reviewed, which demonstrate ***.  I have reviewed the radiology report and {radiology report:34700}.    MRI of the {RIGHT/LEFT:20294} shoulder were reviewed, which demonstrate ***.  I have reviewed the radiology report and {radiology report:85422}.      Scribe Attestation      I,:   am acting as a scribe while in the presence of the attending physician.:       I,:   personally performed the services described in this documentation    as scribed in my presence.:         \

## 2024-07-22 NOTE — PROGRESS NOTES
Ortho Sports Medicine Shoulder New Patient Visit     Assesment:   33 y.o. female left shoulder impingement with biceps tendinosis.    Plan:    Conservative treatment:    Ice to shoulder 1-2 times daily, for 20 minutes at a time.  PT for ROM and strengthening to shoulder, rotator cuff, scapular stabilizers. Script provided.      Imaging:    All imaging from today was reviewed by myself and explained to the patient.       Injection:    No Injection planned at this time.      Surgery:     No surgery is recommended at this point, continue with conservative treatment plan as noted.      Follow up:    Return in about 6 weeks (around 9/2/2024) for Recheck with Dr. Bird.        Chief Complaint   Patient presents with    Left Shoulder - Pain       History of Present Illness:    The patient is a 33 y.o., right hand dominant female whose occupation is currently not working, formerly worked at [employer], referred to me by Contreras Freeman PA-C, seen in clinic for consultation of left shoulder pain.      The patient denies a history of diabetes.  The patient denies a history of thyroid disorder.      Pain is located anterior and lateral that radiates to her upper trapezius.  The patient rates the pain as a 8/10.  The pain has been present for 1 years.      The mechanism of injury was repetitive rotation/lifting as a . The pain is characterized as dull, achy, burning.  The pain is present daily.      Pain is improved by rest and NSAIDS.  Pain is aggravated by overhead activity, reaching back, rotation, and lifting .    Symptoms include popping. Patient denies any shoulder dislocation events.    The patient denies weakness.  The patient denies numbness and tingling.     The patient has tried rest, NSAIDS, and physical therapy.          Shoulder Surgical History:  None    Past Medical, Social and Family History:  Past Medical History:   Diagnosis Date    Acne     Anxiety     Depression     Hypertension      History reviewed. No  pertinent surgical history.  Allergies   Allergen Reactions    No Known Allergies      Current Outpatient Medications on File Prior to Visit   Medication Sig Dispense Refill    albuterol (PROVENTIL HFA,VENTOLIN HFA) 90 mcg/act inhaler Inhale 2 puffs every 6 (six) hours as needed for shortness of breath 18 g 1    nicotine (NICODERM CQ) 14 mg/24hr TD 24 hr patch Place 1 patch on the skin over 24 hours every 24 hours 14 patch 2    nicotine (NICODERM CQ) 21 mg/24 hr TD 24 hr patch Place 1 patch on the skin over 24 hours every 24 hours 14 patch 2    brompheniramine-pseudoephedrine-DM 30-2-10 MG/5ML syrup Take 5 mL by mouth 3 (three) times a day as needed for congestion or cough (Patient not taking: Reported on 5/31/2024) 120 mL 0    fluticasone (FLONASE) 50 mcg/act nasal spray 1 spray into each nostril daily (Patient not taking: Reported on 5/31/2024) 11.1 mL 0    fluticasone (Flovent Diskus) 250 mcg/actuation diskus inhaler Inhale 1 puff 2 (two) times a day Rinse mouth after use. (Patient not taking: Reported on 5/31/2024) 60 blister 2    meloxicam (MOBIC) 15 mg tablet Take 1 tablet (15 mg total) by mouth daily as needed for moderate pain (Patient not taking: Reported on 4/11/2023) 30 tablet 1    predniSONE 10 mg tablet Take 4 tablets for 2 days then decrease by 1 tablet every 2 days until you complete the course with 1 tablet for 2 days (Patient not taking: Reported on 6/6/2024) 20 tablet 0     No current facility-administered medications on file prior to visit.     Social History     Socioeconomic History    Marital status: Single     Spouse name: Not on file    Number of children: Not on file    Years of education: Not on file    Highest education level: Not on file   Occupational History    Occupation: NOT EMPLOYED   Tobacco Use    Smoking status: Heavy Smoker     Current packs/day: 0.50     Average packs/day: 0.5 packs/day for 1.2 years (0.6 ttl pk-yrs)     Types: Cigarettes    Smokeless tobacco: Never    Tobacco  "comments:     8 cigarettes per day, No passive smoke exposure   Vaping Use    Vaping status: Never Used   Substance and Sexual Activity    Alcohol use: Not Currently    Drug use: Never    Sexual activity: Yes     Partners: Male   Other Topics Concern    Not on file   Social History Narrative    Not on file     Social Determinants of Health     Financial Resource Strain: Not on file   Food Insecurity: Not on file   Transportation Needs: Not on file   Physical Activity: Not on file   Stress: Not on file   Social Connections: Not on file   Intimate Partner Violence: Not on file   Housing Stability: Not on file         I have reviewed the past medical, surgical, social and family history, medications and allergies as documented in the EMR.    Review of systems: ROS is negative other than that noted in the HPI.  Constitutional: Negative for fatigue and fever.   HENT: Negative for sore throat.    Respiratory: Negative for shortness of breath.    Cardiovascular: Negative for chest pain.   Gastrointestinal: Negative for abdominal pain.   Endocrine: Negative for cold intolerance and heat intolerance.   Genitourinary: Negative for flank pain.   Musculoskeletal: Negative for back pain.   Skin: Negative for rash.   Allergic/Immunologic: Negative for immunocompromised state.   Neurological: Negative for dizziness.   Psychiatric/Behavioral: Negative for agitation.      Physical Exam:    Blood pressure 108/72, pulse 58, height 5' 7\" (1.702 m), weight 77.6 kg (171 lb).    General/Constitutional: NAD, well developed, well nourished  HENT: Normocephalic, atraumatic  CV: Intact distal pulses, regular rate  Resp: No respiratory distress or labored breathing  GI: Soft and non-tender   Lymphatic: No lymphadenopathy palpated  Neuro: Alert and Oriented x 3, no focal deficits  Psych: Normal mood, normal affect, normal judgement, normal behavior  Skin: Warm, dry, no rashes, no erythema      Shoulder focused exam:       RIGHT LEFT    Scapula " Atrophy Negative Negative     Winging Negative Negative     Protraction Negative Negative    Rotator cuff SS 5/5 5/5     IS 5/5 5/5     SubS 5/5 5/5    ROM     170     ER0 60 60     ER90 Deferred due to pain    Deferred due to pain     IR90 Deferred due to pain    Deferred due to pain     IRb T6    T12    TTP: AC Negative Positive     Biceps Negative Positive     Coracoid Negative Positive    Special Tests: O'Briens Negative Positive     Antunez-shear Negative Positive     Cross body Adduction Negative Negative     Speeds  Negative Positive     Mirella's Negative Negative     Whipple Negative Negative       Neer Negative Positive     Freedman Negative Positive    Instability: Apprehension & relocation not tested not tested     Load & shift not tested not tested    Other: Crank Negative Negative                 UE NV Exam: +2 Radial pulses bilaterally  Sensation intact to light touch C5-T1 bilaterally, Radial/median/ulnar nerve motor intact      Bilateral elbow, wrist, and and forearm ROM full, painless with passive ROM, no ttp or crepitance throughout extremities below shoulder joint    Cervical ROM is full without pain, numbness or tingling      Shoulder Imaging    X-rays of the left shoulder from 04/02/2024 were reviewed, which demonstrate no acute fracture or dislocation. No significant degenerative changes.  I have reviewed the radiology report and agree with their impression.    MRI of the left shoulder from 06/18/2024 were reviewed, which demonstrate Mild biceps tendinosis without tear. Otherwise unremarkable unremarkable left shoulder with an intact rotator cuff and intact glenoid labrum.  I have reviewed the radiology report and agree with their impression.      Scribe Attestation      I,:  Elise Ho am acting as a scribe while in the presence of the attending physician.:       I,:  Reed Bird DO personally performed the services described in this documentation    as scribed in my presence.:

## 2024-08-05 ENCOUNTER — PROCEDURE VISIT (OUTPATIENT)
Age: 34
End: 2024-08-05
Payer: MEDICARE

## 2024-08-05 VITALS
OXYGEN SATURATION: 97 % | SYSTOLIC BLOOD PRESSURE: 118 MMHG | HEIGHT: 67 IN | WEIGHT: 171 LBS | BODY MASS INDEX: 26.84 KG/M2 | HEART RATE: 82 BPM | DIASTOLIC BLOOD PRESSURE: 66 MMHG

## 2024-08-05 DIAGNOSIS — M99.01 SEGMENTAL DYSFUNCTION OF CERVICAL REGION: ICD-10-CM

## 2024-08-05 DIAGNOSIS — M54.2 NECK PAIN: ICD-10-CM

## 2024-08-05 DIAGNOSIS — M25.552 CHRONIC LEFT HIP PAIN: ICD-10-CM

## 2024-08-05 DIAGNOSIS — M99.02 SEGMENTAL DYSFUNCTION OF THORACIC REGION: ICD-10-CM

## 2024-08-05 DIAGNOSIS — G89.29 CHRONIC LEFT HIP PAIN: ICD-10-CM

## 2024-08-05 DIAGNOSIS — M54.16 LUMBAR RADICULOPATHY: ICD-10-CM

## 2024-08-05 DIAGNOSIS — M54.59 MECHANICAL LOW BACK PAIN: Primary | ICD-10-CM

## 2024-08-05 DIAGNOSIS — M99.03 SEGMENTAL DYSFUNCTION OF LUMBAR REGION: ICD-10-CM

## 2024-08-05 DIAGNOSIS — M99.04 SEGMENTAL DYSFUNCTION OF SACRAL REGION: ICD-10-CM

## 2024-08-05 PROCEDURE — 98941 CHIROPRACT MANJ 3-4 REGIONS: CPT | Performed by: CHIROPRACTOR

## 2024-08-05 NOTE — PROGRESS NOTES
Initial date of service: 7/1/24    Diagnoses and all orders for this visit:    Mechanical low back pain    Lumbar radiculopathy    Chronic left hip pain    Segmental dysfunction of lumbar region    Segmental dysfunction of sacral region    Neck pain    Segmental dysfunction of thoracic region    Segmental dysfunction of cervical region       ASSESSMENT:  Pt's symptoms and exam findings consistent with mechanical lbp complicated by mechanical neck pain secondary to repetitive st/sp injury, exacerbated by postural/ergonomic stressors. Pt responded well to flexion biased stretches and manual mobilization of the affected spinal and myofascial tissues with increased ROM; trial of conservative tx recommended consisting of stretching, graded mobilization/manipulation of the affected spinal and myofascial jt dysfunction, postural/ergonomic education and take home stretches/exercises. If symptoms fail to improve with short trial of conservative care, appropriate imaging and referral will be coordinated.  Spent greater than 30 min c pt discussing hx, pe, ddx, tx options and reviewing notes/imaging    PROCEDURE CODES: 96968, 46641    TREATMENT:  Fear avoidance behavior discussion; encouraged and reassured pt that natural course of condition is to improve over time with adherence to tx plan and home care strategies. Home care recommendations: avoid bed rest, walk (but avoid trails and uneven surfaces), gradual return to activity to tolerance (avoid anything that peripheralizes symptoms), call if symptoms peripheralize, worsen, or neurologic deficit progresses. Ther-ex: IASTM; discussed post procedure soreness and/or ecchymosis for up to 36 hrs, applied to affected mm hypertonicities; supine hamstring stretch, supine gluteal stretch, side laying QL stretch, single knee to chest stretch, hip flexor pin-and-stretch, alternating prone hip extension, glute bridge, transitional mvmt education, abdominal bracing; greater than 15 min  spent performing above mentioned ther-ex to improve ROM/flexibility. Cervical Manual traction, Thoracic mobilization/manipulation: prone P-A mob; Lumbar mobilization/manipulation: diversified side laying graded HVLA, flexion-traction; Sacrum- Long axis traction SIJ Manipulation/Mobilization: L SIJ HVLA - long axis distraction, marie drop table maneuver to affected SIJ    HPI:  Randee Leiva is a 33 y.o. female  Chief Complaint   Patient presents with   • Left Leg - Pain   • Neck Pain     Neck pain-9   • Back Pain     Lower back pain-9     The patient presents to the office with L leg and lower back pain, the patient reports the pain started about a year ago without trauma but mentions she had a job for three years where it was L side dominant. The patient reports the L side is throbbing and pain but the R side is numbness. Went to Pain management and injection helped for two months but then the pain came back worse. The patient was referred to our office by CINTHYA Andino, Other previous treatment includes PT and massage in the past but helped a little temporarily. The patient exercises- 1 mile every day, gym- all the machines- whatever she likes.  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 Pt also mentions she has neck pain and shoulder issue, but Ortho is treating shoulder. MRI 2022 Lumbar- Mild disc bulge L4-5 without central canal or foraminal narrowing.   8/5- The patient presents to the office with lower back pain was a little better for a little after her last visit but its back to the same now.     Neck Pain   Associated symptoms include leg pain.   Back Pain  Associated symptoms include leg pain.   Leg Pain       Past Medical History:   Diagnosis Date   • Acne    • Anxiety    • Depression    • Hypertension       History  reviewed. No pertinent surgical history.  The following portions of the patient's history were reviewed and updated as appropriate: allergies, past family history, past medical history, past social history, past surgical history, and problem list.  Review of Systems   Musculoskeletal:  Positive for back pain, myalgias, neck pain and neck stiffness.     Physical Exam  Musculoskeletal:         General: Tenderness present. Normal range of motion.      Cervical back: Normal range of motion. Rigidity and tenderness present.        Back:         Legs:    Skin:     Findings: No erythema.   Neurological:      Mental Status: She is oriented to person, place, and time.      Gait: Gait is intact.      Deep Tendon Reflexes: Reflexes are normal and symmetric.   Psychiatric:         Attention and Perception: Attention normal.         Mood and Affect: Affect normal.         Speech: Speech normal.         Behavior: Behavior is cooperative.         Cognition and Memory: Cognition normal.       SOFT TISSUE ASSESSMENT Hypertonicity and tenderness palpated B C6-T1, Upper trap, lev scap, SCM  T10-S1 erector spinae, hip flexor, glute med/min, QL, hamstring JOINT RESTRICTIONS: C6-T1,  T10-S1 and L SIJ ORTHO: SI jt point tenderness: +; Brook unremarkable for centralization/peripheralization; flaco's, iliac compression, thigh thrust elicit lbp in R/L SIJ; prone femoral nerve stretch neg for upper lumbar neural tension, elicits L SIJ stiffness; sitting root elicits no lbp on R/L; slump test elicits no neural tension R/L, Spurlings- neg, Distraction- neg, Max foraminal compression- neg    Return in about 1 week (around 8/12/2024) for Recheck.

## 2024-08-12 ENCOUNTER — PROCEDURE VISIT (OUTPATIENT)
Age: 34
End: 2024-08-12
Payer: MEDICARE

## 2024-08-12 VITALS
SYSTOLIC BLOOD PRESSURE: 108 MMHG | BODY MASS INDEX: 26.84 KG/M2 | DIASTOLIC BLOOD PRESSURE: 60 MMHG | HEART RATE: 72 BPM | HEIGHT: 67 IN | WEIGHT: 171 LBS | OXYGEN SATURATION: 98 %

## 2024-08-12 DIAGNOSIS — M99.03 SEGMENTAL DYSFUNCTION OF LUMBAR REGION: ICD-10-CM

## 2024-08-12 DIAGNOSIS — M54.2 NECK PAIN: ICD-10-CM

## 2024-08-12 DIAGNOSIS — M99.04 SEGMENTAL DYSFUNCTION OF SACRAL REGION: ICD-10-CM

## 2024-08-12 DIAGNOSIS — M54.16 LUMBAR RADICULOPATHY: ICD-10-CM

## 2024-08-12 DIAGNOSIS — G89.29 CHRONIC LEFT HIP PAIN: ICD-10-CM

## 2024-08-12 DIAGNOSIS — M54.59 MECHANICAL LOW BACK PAIN: Primary | ICD-10-CM

## 2024-08-12 DIAGNOSIS — M99.02 SEGMENTAL DYSFUNCTION OF THORACIC REGION: ICD-10-CM

## 2024-08-12 DIAGNOSIS — M99.01 SEGMENTAL DYSFUNCTION OF CERVICAL REGION: ICD-10-CM

## 2024-08-12 DIAGNOSIS — M25.552 CHRONIC LEFT HIP PAIN: ICD-10-CM

## 2024-08-12 PROCEDURE — 98942 CHIROPRACTIC MANJ 5 REGIONS: CPT | Performed by: CHIROPRACTOR

## 2024-08-12 NOTE — PROGRESS NOTES
Initial date of service: 7/1/24    Diagnoses and all orders for this visit:    Mechanical low back pain    Lumbar radiculopathy    Chronic left hip pain    Segmental dysfunction of lumbar region    Segmental dysfunction of sacral region    Neck pain    Segmental dysfunction of thoracic region    Segmental dysfunction of cervical region       ASSESSMENT:  Pt's symptoms and exam findings consistent with mechanical lbp complicated by mechanical neck pain secondary to repetitive st/sp injury, exacerbated by postural/ergonomic stressors. Pt responded well to flexion biased stretches and manual mobilization of the affected spinal and myofascial tissues with increased ROM; trial of conservative tx recommended consisting of stretching, graded mobilization/manipulation of the affected spinal and myofascial jt dysfunction, postural/ergonomic education and take home stretches/exercises. If symptoms fail to improve with short trial of conservative care, appropriate imaging and referral will be coordinated.  The pt tolerated treatment fairly well    PROCEDURE CODES: 24948    TREATMENT:  Fear avoidance behavior discussion; encouraged and reassured pt that natural course of condition is to improve over time with adherence to tx plan and home care strategies. Home care recommendations: avoid bed rest, walk (but avoid trails and uneven surfaces), gradual return to activity to tolerance (avoid anything that peripheralizes symptoms), call if symptoms peripheralize, worsen, or neurologic deficit progresses. Ther-ex: IASTM; discussed post procedure soreness and/or ecchymosis for up to 36 hrs, applied to affected mm hypertonicities; supine hamstring stretch, supine gluteal stretch, side laying QL stretch, single knee to chest stretch, hip flexor pin-and-stretch, alternating prone hip extension, glute bridge, transitional mvmt education, abdominal bracing; greater than 15 min spent performing above mentioned ther-ex to improve  ROM/flexibility. Cervical Manual traction, Thoracic mobilization/manipulation: prone P-A mob; Lumbar mobilization/manipulation: diversified side laying graded HVLA, flexion-traction; Sacrum- Long axis traction SIJ Manipulation/Mobilization: L SIJ HVLA - long axis distraction, marie drop table maneuver to affected SIJ    HPI:  Randee Leiva is a 33 y.o. female  Chief Complaint   Patient presents with   • Left Leg - Pain   • Neck Pain     Neck pain-9   • Back Pain     Lower back pain-9     The patient presents to the office with L leg and lower back pain, the patient reports the pain started about a year ago without trauma but mentions she had a job for three years where it was L side dominant. The patient reports the L side is throbbing and pain but the R side is numbness. Went to Pain management and injection helped for two months but then the pain came back worse. The patient was referred to our office by CINTHYA Andino, Other previous treatment includes PT and massage in the past but helped a little temporarily. The patient exercises- 1 mile every day, gym- all the machines- whatever she likes.  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 Pt also mentions she has neck pain and shoulder issue, but Ortho is treating shoulder. MRI 2022 Lumbar- Mild disc bulge L4-5 without central canal or foraminal narrowing.   8/5- The patient presents to the office with lower back pain was a little better for a little after her last visit but its back to the same now.   8/12- The patient is feeling sore in the pain in the posterior thigh since last visit.    Neck Pain   Associated symptoms include leg pain.   Back Pain  Associated symptoms include leg pain.   Leg Pain       Past Medical History:   Diagnosis Date   • Acne    • Anxiety    • Depression     • Hypertension       History reviewed. No pertinent surgical history.  The following portions of the patient's history were reviewed and updated as appropriate: allergies, past family history, past medical history, past social history, past surgical history, and problem list.  Review of Systems   Musculoskeletal:  Positive for back pain, myalgias, neck pain and neck stiffness.     Physical Exam  Musculoskeletal:         General: Tenderness present. Normal range of motion.      Cervical back: Normal range of motion. Rigidity and tenderness present.        Back:         Legs:    Skin:     Findings: No erythema.   Neurological:      Mental Status: She is oriented to person, place, and time.      Gait: Gait is intact.      Deep Tendon Reflexes: Reflexes are normal and symmetric.   Psychiatric:         Attention and Perception: Attention normal.         Mood and Affect: Affect normal.         Speech: Speech normal.         Behavior: Behavior is cooperative.         Cognition and Memory: Cognition normal.       SOFT TISSUE ASSESSMENT Hypertonicity and tenderness palpated B C6-T1, Upper trap, lev scap, SCM  T10-S1 erector spinae, hip flexor, glute med/min, QL, hamstring JOINT RESTRICTIONS: C6-T1,  T10-S1 and L SIJ ORTHO: SI jt point tenderness: +; Brook unremarkable for centralization/peripheralization; flaco's, iliac compression, thigh thrust elicit lbp in R/L SIJ; prone femoral nerve stretch neg for upper lumbar neural tension, elicits L SIJ stiffness; sitting root elicits no lbp on R/L; slump test elicits no neural tension R/L, Spurlings- neg, Distraction- neg, Max foraminal compression- neg    Return in about 1 week (around 8/19/2024) for Recheck.

## 2024-08-23 ENCOUNTER — EVALUATION (OUTPATIENT)
Dept: PHYSICAL THERAPY | Facility: REHABILITATION | Age: 34
End: 2024-08-23
Payer: MEDICARE

## 2024-08-23 DIAGNOSIS — M75.42 IMPINGEMENT SYNDROME OF LEFT SHOULDER: Primary | ICD-10-CM

## 2024-08-23 DIAGNOSIS — M75.22 BICEPS TENDINITIS OF LEFT UPPER EXTREMITY: ICD-10-CM

## 2024-08-23 PROCEDURE — 97161 PT EVAL LOW COMPLEX 20 MIN: CPT | Performed by: PHYSICAL THERAPIST

## 2024-08-23 NOTE — PROGRESS NOTES
PT Evaluation     Today's date: 2024  Patient name: Randee Leiva  : 1990  MRN: [id number]  Referring provider: Reed Bird DO  Dx:   Encounter Diagnosis     ICD-10-CM    1. Impingement syndrome of left shoulder  M75.42 Ambulatory Referral to Physical Therapy      2. Biceps tendinitis of left upper extremity  M75.22 Ambulatory Referral to Physical Therapy                     Assessment  Impairments: abnormal coordination, abnormal or restricted ROM, activity intolerance, impaired physical strength and pain with function    Assessment details: Pt is a pleasant 33 y.o. female presenting to outpatient physical therapy with Impingement syndrome of left shoulder  (primary encounter diagnosis), Biceps tendinitis of left upper extremity. Majority of today's evaluation was spent discussing chronic pain. Pt presents with several signs and symptoms suggestive of central sensitization dysfunction. Pt educated on the concept of the nervous system as the bodies alarm system, and the role of nociception to warn the body of danger. Peripheral nerve sensitization, hyeralgesia and allodynia were explained using metaphors to promote deep learning. Patient educated on the concept of neuroplasticity and how factors such as temperature, stress, movement, immunity, and blood flow affect pain via ion channel expression. Instruction provided regarding homeostasis/ion channel balance disruption. Patient encouraged to identify personal yellow flags, and explore/identify activity limitations as a result of the nervous system hypersensitivity. Pt is a good candidate for outpatient physical therapy and would benefit from skilled physical therapy to address limitations and to achieve goals. However, it was mutually agreed that following up with her PCP and a cardiologist may prove to be more beneficial in terms of the benefits she will gain from physical therapy services. Therefore, will hold at present time until these  "follow up appointments are completed. Thank you for this referral.   Understanding of Dx/Px/POC: good     Prognosis: good    Goals  Short-Term Goals (4 weeks)   1. Patient will decrease worst rating of pain by 25% to improve quality of life.  2. Patient will increase strength by 1/2 MMT to improve quality of life with improved efficiency of daily activities.  3. Patient will improve ROM by 25% indicating improved mobility of affected area.    Long-Term Goals (8 weeks)   1. Patient will decrease pain by 50% at worst in comparison to IE indicating significant reduction in pain and improved quality of life.  2. Patient will demonstrate strength WFL compared to IE levels indicating ability to independently manage pain symptoms to accomplish daily activities.   3. Patient will be independent with HEP with good form accomplished.      Plan  Patient would benefit from: PT eval and skilled PT  Planned modality interventions: cryotherapy and thermotherapy: hydrocollator packs    Planned therapy interventions: IADL retraining, body mechanics training, flexibility, functional ROM exercises, home exercise program, neuromuscular re-education, manual therapy, postural training, strengthening, stretching, therapeutic activities, therapeutic exercise, joint mobilization and IASTM    Frequency: 2x week  Duration in weeks: 8  Treatment plan discussed with: patient      Subjective Evaluation    History of Present Illness  Mechanism of injury: 08/23/24   Pt returns to therapy for c/o left shoulder pain and dysfunction. Notes she was to her orthopedic physician recently, noting she had an MRI recently, which she states revealed inflamed tissues in her shoulder region. Notes she has been seeing a chiropractor lately, which has helped with her symptoms. Notes she also left her job at [employer] in April, which was a large contributor to her symptoms. States she will have 3 or 4 days in a row of \"bad days,\" with 1 \"good day\" intermittently " between. Notes she has been working out and exercising as her symptoms allow. Reports when she has a bad day, it's difficult to perform ADLs, such as reaching and lifting.     States she is fearful sometimes that her shoulder pain may be indicative of a cardiac issue, as she has a family history of cardiac events. She also has children at home, stating she is fearful of what may happen should these symptoms ever actually indicated a cardiac issue.      Objective           Precautions:   Patient Active Problem List   Diagnosis    Anxiety state    Migraine without aura, with intractable migraine, so stated, with status migrainosus    Right hand pain    Carpal tunnel syndrome of right wrist    Laryngopharyngeal reflux (LPR)    Gastroesophageal reflux disease    Right leg paresthesias    Paresthesias    Chronic headaches    Numbness and tingling in right hand    Arthralgia of right temporomandibular joint    Neck pain    Skin lesion    COVID-19 virus infection    Musculoskeletal chest pain    Leukopenia    Hypocalcemia    Lumbar radiculitis    Cigarette nicotine dependence without complication    Shortness of breath    Low back pain with sciatica    Carpal tunnel syndrome on left    Bilateral foot pain    Eosinophilia    Abnormal PFT    Sacroiliitis (HCC)    BMI 28.0-28.9,adult    Weakness of right arm    Weakness of right leg    Chronic left shoulder pain    Right leg pain     Allergies   Allergen Reactions    No Known Allergies         Daily Treatment Diary    Date 8/23            FOTO nv            Re-Eval IE               Manuals                                                        Neuro Re-Ed     SA TB wall walks             SA TB 3-way wall reach             SA steering wheel             SA wall foam roll ups             Ball circles on wall                          Prone I, T             Side plank thread the needle                                        Ther Ex    TB scap retract             TB horiz abd              TB no monies                          Quadruped thread the needle                                                    Ther Activity    UBE                          Gait Training                              Modalities

## 2024-08-30 ENCOUNTER — PROCEDURE VISIT (OUTPATIENT)
Age: 34
End: 2024-08-30
Payer: MEDICARE

## 2024-08-30 VITALS
DIASTOLIC BLOOD PRESSURE: 76 MMHG | BODY MASS INDEX: 26.84 KG/M2 | SYSTOLIC BLOOD PRESSURE: 130 MMHG | WEIGHT: 171 LBS | HEART RATE: 86 BPM | HEIGHT: 67 IN | OXYGEN SATURATION: 98 %

## 2024-08-30 DIAGNOSIS — G89.29 CHRONIC LEFT HIP PAIN: ICD-10-CM

## 2024-08-30 DIAGNOSIS — M99.04 SEGMENTAL DYSFUNCTION OF SACRAL REGION: ICD-10-CM

## 2024-08-30 DIAGNOSIS — M99.02 SEGMENTAL DYSFUNCTION OF THORACIC REGION: ICD-10-CM

## 2024-08-30 DIAGNOSIS — M54.16 LUMBAR RADICULOPATHY: ICD-10-CM

## 2024-08-30 DIAGNOSIS — M25.552 CHRONIC LEFT HIP PAIN: ICD-10-CM

## 2024-08-30 DIAGNOSIS — M99.03 SEGMENTAL DYSFUNCTION OF LUMBAR REGION: ICD-10-CM

## 2024-08-30 DIAGNOSIS — M54.2 NECK PAIN: ICD-10-CM

## 2024-08-30 DIAGNOSIS — M54.59 MECHANICAL LOW BACK PAIN: Primary | ICD-10-CM

## 2024-08-30 DIAGNOSIS — M99.01 SEGMENTAL DYSFUNCTION OF CERVICAL REGION: ICD-10-CM

## 2024-08-30 PROCEDURE — 98942 CHIROPRACTIC MANJ 5 REGIONS: CPT | Performed by: CHIROPRACTOR

## 2024-08-30 NOTE — PROGRESS NOTES
Initial date of service: 7/1/24    Diagnoses and all orders for this visit:    Mechanical low back pain    Lumbar radiculopathy    Chronic left hip pain    Segmental dysfunction of lumbar region    Segmental dysfunction of sacral region    Neck pain    Segmental dysfunction of cervical region    Segmental dysfunction of thoracic region       ASSESSMENT:  Pt's symptoms and exam findings consistent with mechanical lbp complicated by mechanical neck pain secondary to repetitive st/sp injury, exacerbated by postural/ergonomic stressors. Pt responded well to flexion biased stretches and manual mobilization of the affected spinal and myofascial tissues with increased ROM; trial of conservative tx recommended consisting of stretching, graded mobilization/manipulation of the affected spinal and myofascial jt dysfunction, postural/ergonomic education and take home stretches/exercises. If symptoms fail to improve with short trial of conservative care, appropriate imaging and referral will be coordinated.  The pt tolerated treatment fairly well decrease in pain and mm spasm    PROCEDURE CODES: 09337    TREATMENT:  Fear avoidance behavior discussion; encouraged and reassured pt that natural course of condition is to improve over time with adherence to tx plan and home care strategies. Home care recommendations: avoid bed rest, walk (but avoid trails and uneven surfaces), gradual return to activity to tolerance (avoid anything that peripheralizes symptoms), call if symptoms peripheralize, worsen, or neurologic deficit progresses. Ther-ex: IASTM; discussed post procedure soreness and/or ecchymosis for up to 36 hrs, applied to affected mm hypertonicities; supine hamstring stretch, supine gluteal stretch, side laying QL stretch, single knee to chest stretch, hip flexor pin-and-stretch, alternating prone hip extension, glute bridge, transitional mvmt education, abdominal bracing; greater than 15 min spent performing above mentioned  ther-ex to improve ROM/flexibility. Cervical Manual traction, Thoracic mobilization/manipulation: prone P-A mob; Lumbar mobilization/manipulation: diversified side laying graded HVLA, flexion-traction; Sacrum- Long axis traction SIJ Manipulation/Mobilization: L SIJ HVLA - long axis distraction, marie drop table maneuver to affected SIJ    HPI:  Randee Leiva is a 33 y.o. female  Chief Complaint   Patient presents with   • Neck - Pain     Pain level is at a 8   • Left Leg - Pain     Pain level is a 10     The patient presents to the office with L leg and lower back pain, the patient reports the pain started about a year ago without trauma but mentions she had a job for three years where it was L side dominant. The patient reports the L side is throbbing and pain but the R side is numbness. Went to Pain management and injection helped for two months but then the pain came back worse. The patient was referred to our office by CINTHYA Andino, Other previous treatment includes PT and massage in the past but helped a little temporarily. The patient exercises- 1 mile every day, gym- all the machines- whatever she likes.  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 Pt also mentions she has neck pain and shoulder issue, but Ortho is treating shoulder. MRI 2022 Lumbar- Mild disc bulge L4-5 without central canal or foraminal narrowing.   8/5- The patient presents to the office with lower back pain was a little better for a little after her last visit but its back to the same now.   8/12- The patient is feeling sore in the pain in the posterior thigh since last visit.  8/30- The patient has 3 days of improvements after last visit but then she has her leg screaming at her this week.    Neck Pain   Associated symptoms include leg pain.   Back  Pain  Associated symptoms include leg pain.   Leg Pain       Past Medical History:   Diagnosis Date   • Acne    • Anxiety    • Depression    • Hypertension       History reviewed. No pertinent surgical history.  The following portions of the patient's history were reviewed and updated as appropriate: allergies, past family history, past medical history, past social history, past surgical history, and problem list.  Review of Systems   Musculoskeletal:  Positive for back pain, myalgias, neck pain and neck stiffness.     Physical Exam  Musculoskeletal:         General: Tenderness present. Normal range of motion.      Cervical back: Normal range of motion. Rigidity and tenderness present.        Back:         Legs:    Skin:     Findings: No erythema.   Neurological:      Mental Status: She is oriented to person, place, and time.      Gait: Gait is intact.      Deep Tendon Reflexes: Reflexes are normal and symmetric.   Psychiatric:         Attention and Perception: Attention normal.         Mood and Affect: Affect normal.         Speech: Speech normal.         Behavior: Behavior is cooperative.         Cognition and Memory: Cognition normal.       SOFT TISSUE ASSESSMENT Hypertonicity and tenderness palpated B C6-T1, Upper trap, lev scap, SCM  T10-S1 erector spinae, hip flexor, glute med/min, QL, hamstring JOINT RESTRICTIONS: C6-T1,  T10-S1 and L SIJ ORTHO: SI jt point tenderness: +; Brook unremarkable for centralization/peripheralization; flaco's, iliac compression, thigh thrust elicit lbp in R/L SIJ; prone femoral nerve stretch neg for upper lumbar neural tension, elicits L SIJ stiffness; sitting root elicits no lbp on R/L; slump test elicits no neural tension R/L, Spurlings- neg, Distraction- neg, Max foraminal compression- neg    Return in about 1 week (around 9/6/2024) for Recheck.

## 2024-09-06 ENCOUNTER — PROCEDURE VISIT (OUTPATIENT)
Age: 34
End: 2024-09-06
Payer: MEDICARE

## 2024-09-06 VITALS
HEIGHT: 67 IN | DIASTOLIC BLOOD PRESSURE: 64 MMHG | WEIGHT: 171 LBS | SYSTOLIC BLOOD PRESSURE: 108 MMHG | HEART RATE: 81 BPM | OXYGEN SATURATION: 97 % | BODY MASS INDEX: 26.84 KG/M2

## 2024-09-06 DIAGNOSIS — G89.29 CHRONIC LEFT HIP PAIN: ICD-10-CM

## 2024-09-06 DIAGNOSIS — M99.04 SEGMENTAL DYSFUNCTION OF SACRAL REGION: ICD-10-CM

## 2024-09-06 DIAGNOSIS — M54.16 LUMBAR RADICULOPATHY: ICD-10-CM

## 2024-09-06 DIAGNOSIS — M54.2 NECK PAIN: ICD-10-CM

## 2024-09-06 DIAGNOSIS — M99.02 SEGMENTAL DYSFUNCTION OF THORACIC REGION: ICD-10-CM

## 2024-09-06 DIAGNOSIS — M54.59 MECHANICAL LOW BACK PAIN: Primary | ICD-10-CM

## 2024-09-06 DIAGNOSIS — M25.552 CHRONIC LEFT HIP PAIN: ICD-10-CM

## 2024-09-06 DIAGNOSIS — M99.01 SEGMENTAL DYSFUNCTION OF CERVICAL REGION: ICD-10-CM

## 2024-09-06 DIAGNOSIS — M99.03 SEGMENTAL DYSFUNCTION OF LUMBAR REGION: ICD-10-CM

## 2024-09-06 PROCEDURE — 98942 CHIROPRACTIC MANJ 5 REGIONS: CPT | Performed by: CHIROPRACTOR

## 2024-09-06 NOTE — PROGRESS NOTES
Initial date of service: 7/1/24    Diagnoses and all orders for this visit:    Mechanical low back pain    Lumbar radiculopathy    Chronic left hip pain    Segmental dysfunction of lumbar region    Segmental dysfunction of sacral region    Neck pain    Segmental dysfunction of cervical region    Segmental dysfunction of thoracic region       ASSESSMENT:  Pt's symptoms and exam findings consistent with mechanical lbp complicated by mechanical neck pain secondary to repetitive st/sp injury, exacerbated by postural/ergonomic stressors. Pt responded well to flexion biased stretches and manual mobilization of the affected spinal and myofascial tissues with increased ROM; trial of conservative tx recommended consisting of stretching, graded mobilization/manipulation of the affected spinal and myofascial jt dysfunction, postural/ergonomic education and take home stretches/exercises. If symptoms fail to improve with short trial of conservative care, appropriate imaging and referral will be coordinated.  The pt tolerated treatment fairly well decrease in pain and mm spasm    PROCEDURE CODES: 81120    TREATMENT:  Fear avoidance behavior discussion; encouraged and reassured pt that natural course of condition is to improve over time with adherence to tx plan and home care strategies. Home care recommendations: avoid bed rest, walk (but avoid trails and uneven surfaces), gradual return to activity to tolerance (avoid anything that peripheralizes symptoms), call if symptoms peripheralize, worsen, or neurologic deficit progresses. Ther-ex: IASTM; discussed post procedure soreness and/or ecchymosis for up to 36 hrs, applied to affected mm hypertonicities; supine hamstring stretch, supine gluteal stretch, side laying QL stretch, single knee to chest stretch, hip flexor pin-and-stretch, alternating prone hip extension, glute bridge, transitional mvmt education, abdominal bracing; greater than 15 min spent performing above mentioned  ther-ex to improve ROM/flexibility. Cervical Manual traction, Thoracic mobilization/manipulation: prone P-A mob; Lumbar mobilization/manipulation: diversified side laying graded HVLA, flexion-traction; Sacrum- Long axis traction SIJ Manipulation/Mobilization: L SIJ HVLA - long axis distraction, marie drop table maneuver to affected SIJ    HPI:  Randee Leiva is a 33 y.o. female  Chief Complaint   Patient presents with   • Neck Pain     Neck pain-8   • Back Pain     Lower back pain-8   • Leg Pain     Left leg pain-18     The patient presents to the office with L leg and lower back pain, the patient reports the pain started about a year ago without trauma but mentions she had a job for three years where it was L side dominant. The patient reports the L side is throbbing and pain but the R side is numbness. Went to Pain management and injection helped for two months but then the pain came back worse. The patient was referred to our office by CINTHYA Andino, Other previous treatment includes PT and massage in the past but helped a little temporarily. The patient exercises- 1 mile every day, gym- all the machines- whatever she likes.  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 Pt also mentions she has neck pain and shoulder issue, but Ortho is treating shoulder. MRI 2022 Lumbar- Mild disc bulge L4-5 without central canal or foraminal narrowing.   8/5- The patient presents to the office with lower back pain was a little better for a little after her last visit but its back to the same now.   8/12- The patient is feeling sore in the pain in the posterior thigh since last visit.  8/30- The patient has 3 days of improvements after last visit but then she has her leg screaming at her this week.  9/6- The patient feels the same today but  mentions improvements for several days after last visit.    Neck Pain   Associated symptoms include leg pain.   Back Pain  Associated symptoms include leg pain.   Leg Pain       Past Medical History:   Diagnosis Date   • Acne    • Anxiety    • Depression    • Hypertension       History reviewed. No pertinent surgical history.  The following portions of the patient's history were reviewed and updated as appropriate: allergies, past family history, past medical history, past social history, past surgical history, and problem list.  Review of Systems   Musculoskeletal:  Positive for back pain, myalgias, neck pain and neck stiffness.     Physical Exam  Musculoskeletal:         General: Tenderness present. Normal range of motion.      Cervical back: Normal range of motion. Rigidity and tenderness present.        Back:         Legs:    Skin:     Findings: No erythema.   Neurological:      Mental Status: She is oriented to person, place, and time.      Gait: Gait is intact.      Deep Tendon Reflexes: Reflexes are normal and symmetric.   Psychiatric:         Attention and Perception: Attention normal.         Mood and Affect: Affect normal.         Speech: Speech normal.         Behavior: Behavior is cooperative.         Cognition and Memory: Cognition normal.       SOFT TISSUE ASSESSMENT Hypertonicity and tenderness palpated B C6-T1, Upper trap, lev scap, SCM  T10-S1 erector spinae, hip flexor, glute med/min, QL, hamstring JOINT RESTRICTIONS: C6-T1,  T10-S1 and L SIJ ORTHO: SI jt point tenderness: +; Brook unremarkable for centralization/peripheralization; flaco's, iliac compression, thigh thrust elicit lbp in R/L SIJ; prone femoral nerve stretch neg for upper lumbar neural tension, elicits L SIJ stiffness; sitting root elicits no lbp on R/L; slump test elicits no neural tension R/L, Spurlings- neg, Distraction- neg, Max foraminal compression- neg    Return in about 1 week (around 9/13/2024) for Recheck.

## 2024-09-11 ENCOUNTER — TELEPHONE (OUTPATIENT)
Dept: OBGYN CLINIC | Facility: CLINIC | Age: 34
End: 2024-09-11

## 2024-09-23 ENCOUNTER — PROCEDURE VISIT (OUTPATIENT)
Age: 34
End: 2024-09-23
Payer: MEDICARE

## 2024-09-23 VITALS
DIASTOLIC BLOOD PRESSURE: 72 MMHG | OXYGEN SATURATION: 98 % | HEIGHT: 67 IN | SYSTOLIC BLOOD PRESSURE: 110 MMHG | BODY MASS INDEX: 26.84 KG/M2 | HEART RATE: 84 BPM | WEIGHT: 171 LBS

## 2024-09-23 DIAGNOSIS — M99.03 SEGMENTAL DYSFUNCTION OF LUMBAR REGION: ICD-10-CM

## 2024-09-23 DIAGNOSIS — M54.2 NECK PAIN: ICD-10-CM

## 2024-09-23 DIAGNOSIS — M99.01 SEGMENTAL DYSFUNCTION OF CERVICAL REGION: ICD-10-CM

## 2024-09-23 DIAGNOSIS — G89.29 CHRONIC LEFT HIP PAIN: ICD-10-CM

## 2024-09-23 DIAGNOSIS — M99.02 SEGMENTAL DYSFUNCTION OF THORACIC REGION: ICD-10-CM

## 2024-09-23 DIAGNOSIS — M54.16 LUMBAR RADICULOPATHY: ICD-10-CM

## 2024-09-23 DIAGNOSIS — M99.04 SEGMENTAL DYSFUNCTION OF SACRAL REGION: ICD-10-CM

## 2024-09-23 DIAGNOSIS — M54.59 MECHANICAL LOW BACK PAIN: Primary | ICD-10-CM

## 2024-09-23 DIAGNOSIS — M25.552 CHRONIC LEFT HIP PAIN: ICD-10-CM

## 2024-09-23 PROCEDURE — 98942 CHIROPRACTIC MANJ 5 REGIONS: CPT | Performed by: CHIROPRACTOR

## 2024-09-23 NOTE — PROGRESS NOTES
Initial date of service: 7/1/24    Diagnoses and all orders for this visit:    Mechanical low back pain    Lumbar radiculopathy    Chronic left hip pain    Segmental dysfunction of lumbar region    Segmental dysfunction of sacral region    Neck pain    Segmental dysfunction of cervical region    Segmental dysfunction of thoracic region       ASSESSMENT:  Pt's symptoms and exam findings consistent with mechanical lbp complicated by mechanical neck pain secondary to repetitive st/sp injury, exacerbated by postural/ergonomic stressors. Pt responded well to flexion biased stretches and manual mobilization of the affected spinal and myofascial tissues with increased ROM; trial of conservative tx recommended consisting of stretching, graded mobilization/manipulation of the affected spinal and myofascial jt dysfunction, postural/ergonomic education and take home stretches/exercises. If symptoms fail to improve with short trial of conservative care, appropriate imaging and referral will be coordinated.  The pt status is the same.     PROCEDURE CODES: 42043    TREATMENT:  Fear avoidance behavior discussion; encouraged and reassured pt that natural course of condition is to improve over time with adherence to tx plan and home care strategies. Home care recommendations: avoid bed rest, walk (but avoid trails and uneven surfaces), gradual return to activity to tolerance (avoid anything that peripheralizes symptoms), call if symptoms peripheralize, worsen, or neurologic deficit progresses. Ther-ex: IASTM; discussed post procedure soreness and/or ecchymosis for up to 36 hrs, applied to affected mm hypertonicities; supine hamstring stretch, supine gluteal stretch, side laying QL stretch, single knee to chest stretch, hip flexor pin-and-stretch, alternating prone hip extension, glute bridge, transitional mvmt education, abdominal bracing; greater than 15 min spent performing above mentioned ther-ex to improve ROM/flexibility.  Cervical Manual traction, Thoracic mobilization/manipulation: prone P-A mob; Lumbar mobilization/manipulation: diversified side laying graded HVLA, flexion-traction; Sacrum- Long axis traction SIJ Manipulation/Mobilization: L SIJ HVLA - long axis distraction, marie drop table maneuver to affected SIJ    HPI:  Randee Leiva is a 33 y.o. female  Chief Complaint   Patient presents with    Neck Pain     Neck pain-10    Back Pain     Lower back pain-10    Sciatica     The patient presents to the office with L leg and lower back pain, the patient reports the pain started about a year ago without trauma but mentions she had a job for three years where it was L side dominant. The patient reports the L side is throbbing and pain but the R side is numbness. Went to Pain management and injection helped for two months but then the pain came back worse. The patient was referred to our office by CINTHYA Andino, Other previous treatment includes PT and massage in the past but helped a little temporarily. The patient exercises- 1 mile every day, gym- all the machines- whatever she likes.  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 Pt also mentions she has neck pain and shoulder issue, but Ortho is treating shoulder. MRI 2022 Lumbar- Mild disc bulge L4-5 without central canal or foraminal narrowing.   8/5- The patient presents to the office with lower back pain was a little better for a little after her last visit but its back to the same now.   8/12- The patient is feeling sore in the pain in the posterior thigh since last visit.  8/30- The patient has 3 days of improvements after last visit but then she has her leg screaming at her this week.  9/6- The patient feels the same today but mentions improvements for several days after last visit.  9/23-  The patient is feeling is 10/10 pain    Neck Pain   Associated symptoms include leg pain.   Back Pain  Associated symptoms include leg pain.   Leg Pain       Past Medical History:   Diagnosis Date    Acne     Anxiety     Depression     Hypertension       History reviewed. No pertinent surgical history.  The following portions of the patient's history were reviewed and updated as appropriate: allergies, past family history, past medical history, past social history, past surgical history, and problem list.  Review of Systems   Musculoskeletal:  Positive for back pain, myalgias, neck pain and neck stiffness.     Physical Exam  Musculoskeletal:         General: Tenderness present. Normal range of motion.      Cervical back: Normal range of motion. Rigidity and tenderness present.        Back:         Legs:    Skin:     Findings: No erythema.   Neurological:      Mental Status: She is oriented to person, place, and time.      Gait: Gait is intact.      Deep Tendon Reflexes: Reflexes are normal and symmetric.   Psychiatric:         Attention and Perception: Attention normal.         Mood and Affect: Affect normal.         Speech: Speech normal.         Behavior: Behavior is cooperative.         Cognition and Memory: Cognition normal.       SOFT TISSUE ASSESSMENT Hypertonicity and tenderness palpated B C6-T1, Upper trap, lev scap, SCM  T10-S1 erector spinae, hip flexor, glute med/min, QL, hamstring JOINT RESTRICTIONS: C6-T1,  T10-S1 and L SIJ ORTHO: SI jt point tenderness: +; Brook unremarkable for centralization/peripheralization; flaco's, iliac compression, thigh thrust elicit lbp in R/L SIJ; prone femoral nerve stretch neg for upper lumbar neural tension, elicits L SIJ stiffness; sitting root elicits no lbp on R/L; slump test elicits no neural tension R/L, Spurlings- neg, Distraction- neg, Max foraminal compression- neg    Return in about 1 week (around 9/30/2024) for Recheck.

## 2024-09-23 NOTE — Clinical Note
Pt has completed 6 appts with no significant improvements in neck or back pain. Especially back pain. She is going to be making an appt with you as she had to cancel her last one with you.

## 2024-10-02 ENCOUNTER — TELEPHONE (OUTPATIENT)
Dept: OBGYN CLINIC | Facility: HOSPITAL | Age: 34
End: 2024-10-02

## 2024-10-02 NOTE — TELEPHONE ENCOUNTER
Caller: Patient    Doctor: [facility]    Reason for call: Transferred call to [facility]. Patient would like to schedule a fu with [facility]      Call back#: [phone]

## 2024-10-04 ENCOUNTER — PROCEDURE VISIT (OUTPATIENT)
Age: 34
End: 2024-10-04
Payer: MEDICARE

## 2024-10-04 ENCOUNTER — OFFICE VISIT (OUTPATIENT)
Age: 34
End: 2024-10-04
Payer: MEDICARE

## 2024-10-04 VITALS
WEIGHT: 164 LBS | HEART RATE: 70 BPM | BODY MASS INDEX: 25.74 KG/M2 | TEMPERATURE: 97.4 F | DIASTOLIC BLOOD PRESSURE: 70 MMHG | SYSTOLIC BLOOD PRESSURE: 110 MMHG | HEIGHT: 67 IN | RESPIRATION RATE: 18 BRPM | OXYGEN SATURATION: 96 %

## 2024-10-04 VITALS
OXYGEN SATURATION: 99 % | DIASTOLIC BLOOD PRESSURE: 74 MMHG | WEIGHT: 171 LBS | HEIGHT: 67 IN | HEART RATE: 67 BPM | BODY MASS INDEX: 26.84 KG/M2 | SYSTOLIC BLOOD PRESSURE: 116 MMHG

## 2024-10-04 DIAGNOSIS — R06.02 SHORTNESS OF BREATH: ICD-10-CM

## 2024-10-04 DIAGNOSIS — M54.59 MECHANICAL LOW BACK PAIN: Primary | ICD-10-CM

## 2024-10-04 DIAGNOSIS — R07.9 CHEST PAIN, UNSPECIFIED TYPE: ICD-10-CM

## 2024-10-04 DIAGNOSIS — K21.9 GASTROESOPHAGEAL REFLUX DISEASE, UNSPECIFIED WHETHER ESOPHAGITIS PRESENT: ICD-10-CM

## 2024-10-04 DIAGNOSIS — Z00.00 WELL ADULT EXAM: Primary | ICD-10-CM

## 2024-10-04 DIAGNOSIS — M99.03 SEGMENTAL DYSFUNCTION OF LUMBAR REGION: ICD-10-CM

## 2024-10-04 DIAGNOSIS — M25.552 CHRONIC LEFT HIP PAIN: ICD-10-CM

## 2024-10-04 DIAGNOSIS — F17.210 CIGARETTE NICOTINE DEPENDENCE WITHOUT COMPLICATION: ICD-10-CM

## 2024-10-04 DIAGNOSIS — G89.29 CHRONIC LEFT HIP PAIN: ICD-10-CM

## 2024-10-04 DIAGNOSIS — M99.02 SEGMENTAL DYSFUNCTION OF THORACIC REGION: ICD-10-CM

## 2024-10-04 DIAGNOSIS — M99.04 SEGMENTAL DYSFUNCTION OF SACRAL REGION: ICD-10-CM

## 2024-10-04 DIAGNOSIS — M54.16 LUMBAR RADICULOPATHY: ICD-10-CM

## 2024-10-04 DIAGNOSIS — M99.01 SEGMENTAL DYSFUNCTION OF CERVICAL REGION: ICD-10-CM

## 2024-10-04 DIAGNOSIS — M54.2 NECK PAIN: ICD-10-CM

## 2024-10-04 PROCEDURE — 99406 BEHAV CHNG SMOKING 3-10 MIN: CPT | Performed by: PHYSICIAN ASSISTANT

## 2024-10-04 PROCEDURE — 99395 PREV VISIT EST AGE 18-39: CPT | Performed by: PHYSICIAN ASSISTANT

## 2024-10-04 PROCEDURE — 98942 CHIROPRACTIC MANJ 5 REGIONS: CPT | Performed by: CHIROPRACTOR

## 2024-10-04 PROCEDURE — 99214 OFFICE O/P EST MOD 30 MIN: CPT | Performed by: PHYSICIAN ASSISTANT

## 2024-10-04 RX ORDER — ALBUTEROL SULFATE 90 UG/1
2 INHALANT RESPIRATORY (INHALATION) EVERY 6 HOURS PRN
Qty: 18 G | Refills: 0 | Status: SHIPPED | OUTPATIENT
Start: 2024-10-04

## 2024-10-04 RX ORDER — NICOTINE 21 MG/24HR
1 PATCH, TRANSDERMAL 24 HOURS TRANSDERMAL EVERY 24 HOURS
Qty: 14 PATCH | Refills: 2 | Status: SHIPPED | OUTPATIENT
Start: 2024-10-04

## 2024-10-04 NOTE — ASSESSMENT & PLAN NOTE
-She will continue with the rescue inhaler as needed which is infrequent.  If utilizing more than twice a week recommend follow-up  - Smoking cessation highly recommended  Orders:    albuterol (PROVENTIL HFA,VENTOLIN HFA) 90 mcg/act inhaler; Inhale 2 puffs every 6 (six) hours as needed for shortness of breath    -Recommend follow-up in 4 months    M*Modal software was used to dictate this note. It may contain errors with dictating incorrect words/spelling. Please contact provider directly for any questions.

## 2024-10-04 NOTE — ASSESSMENT & PLAN NOTE
-As stated above  - She has been advised that when she feels the last 2 weeks of the patch 21 mg she should then send me a message requesting the 14 mg for 6 weeks and then thereafter she will then need the 7 mg for 6 weeks  Orders:    nicotine (NICODERM CQ) 21 mg/24 hr TD 24 hr patch; Place 1 patch on the skin over 24 hours every 24 hours

## 2024-10-04 NOTE — ASSESSMENT & PLAN NOTE
-I did put a referral in the system for her to follow-up with gastroenterology for reflux  Orders:    Ambulatory Referral to Gastroenterology; Future

## 2024-10-04 NOTE — PROGRESS NOTES
Initial date of service: 7/1/24    Diagnoses and all orders for this visit:    Mechanical low back pain    Lumbar radiculopathy    Chronic left hip pain    Segmental dysfunction of lumbar region    Segmental dysfunction of sacral region    Neck pain    Segmental dysfunction of cervical region    Segmental dysfunction of thoracic region       ASSESSMENT:  Pt's symptoms and exam findings consistent with mechanical lbp complicated by mechanical neck pain secondary to repetitive st/sp injury, exacerbated by postural/ergonomic stressors. Pt responded well to flexion biased stretches and manual mobilization of the affected spinal and myofascial tissues with increased ROM; trial of conservative tx recommended consisting of stretching, graded mobilization/manipulation of the affected spinal and myofascial jt dysfunction, postural/ergonomic education and take home stretches/exercises. If symptoms fail to improve with short trial of conservative care, appropriate imaging and referral will be coordinated.  10/4-The pt status is slightly better from 10/10 last week to 8/10 this week. Pt was 8 mins late for appt, so we concentrated on lower back at this time.     PROCEDURE CODES: 91174    TREATMENT:  Fear avoidance behavior discussion; encouraged and reassured pt that natural course of condition is to improve over time with adherence to tx plan and home care strategies. Home care recommendations: avoid bed rest, walk (but avoid trails and uneven surfaces), gradual return to activity to tolerance (avoid anything that peripheralizes symptoms), call if symptoms peripheralize, worsen, or neurologic deficit progresses. Ther-ex: IASTM; discussed post procedure soreness and/or ecchymosis for up to 36 hrs, applied to affected mm hypertonicities; supine hamstring stretch, supine gluteal stretch, side laying QL stretch, single knee to chest stretch, hip flexor pin-and-stretch, alternating prone hip extension, glute bridge, transitional  mvmt education, abdominal bracing; greater than 15 min spent performing above mentioned ther-ex to improve ROM/flexibility. Cervical Manual traction, Thoracic mobilization/manipulation: prone P-A mob; Lumbar mobilization/manipulation: diversified side laying graded HVLA, flexion-traction; Sacrum- Long axis traction SIJ Manipulation/Mobilization: L SIJ HVLA - long axis distraction, marie drop table maneuver to affected SIJ    HPI:  Randee Leiva is a 33 y.o. female  Chief Complaint   Patient presents with    Neck Pain     Neck pain-8    Back Pain     Lower back pain-8    Sciatica     The patient presents to the office with L leg and lower back pain, the patient reports the pain started about a year ago without trauma but mentions she had a job for three years where it was L side dominant. The patient reports the L side is throbbing and pain but the R side is numbness. Went to Pain management and injection helped for two months but then the pain came back worse. The patient was referred to our office by CINTHYA Andino, Other previous treatment includes PT and massage in the past but helped a little temporarily. The patient exercises- 1 mile every day, gym- all the machines- whatever she likes.  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 Pt also mentions she has neck pain and shoulder issue, but Ortho is treating shoulder. MRI 2022 Lumbar- Mild disc bulge L4-5 without central canal or foraminal narrowing.   8/5- The patient presents to the office with lower back pain was a little better for a little after her last visit but its back to the same now.   8/12- The patient is feeling sore in the pain in the posterior thigh since last visit.  8/30- The patient has 3 days of improvements after last visit but then she has her leg screaming at  her this week.  9/6- The patient feels the same today but mentions improvements for several days after last visit.  9/23- The patient is feeling is 10/10 pain  10/4- The patient reports pain that is 8/10    Neck Pain   Associated symptoms include leg pain.   Back Pain  Associated symptoms include leg pain.   Leg Pain       Past Medical History:   Diagnosis Date    Acne     Anxiety     Depression     Hypertension       History reviewed. No pertinent surgical history.  The following portions of the patient's history were reviewed and updated as appropriate: allergies, past family history, past medical history, past social history, past surgical history, and problem list.  Review of Systems   Musculoskeletal:  Positive for back pain, myalgias, neck pain and neck stiffness.     Physical Exam  Musculoskeletal:         General: Tenderness present. Normal range of motion.      Cervical back: Normal range of motion. Rigidity and tenderness present.        Back:         Legs:    Skin:     Findings: No erythema.   Neurological:      Mental Status: She is oriented to person, place, and time.      Gait: Gait is intact.      Deep Tendon Reflexes: Reflexes are normal and symmetric.   Psychiatric:         Attention and Perception: Attention normal.         Mood and Affect: Affect normal.         Speech: Speech normal.         Behavior: Behavior is cooperative.         Cognition and Memory: Cognition normal.     SOFT TISSUE ASSESSMENT Hypertonicity and tenderness palpated B C6-T1, Upper trap, lev scap, SCM  T10-S1 erector spinae, hip flexor, glute med/min, QL, hamstring JOINT RESTRICTIONS: C6-T1,  T10-S1 and L SIJ ORTHO: SI jt point tenderness: +; Brook unremarkable for centralization/peripheralization; flaco's, iliac compression, thigh thrust elicit lbp in R/L SIJ; prone femoral nerve stretch neg for upper lumbar neural tension, elicits L SIJ stiffness; sitting root elicits no lbp on R/L; slump test elicits no neural tension  R/L, Spurlings- neg, Distraction- neg, Max foraminal compression- neg    Return in about 1 week (around 10/11/2024) for Recheck.

## 2024-10-04 NOTE — PROGRESS NOTES
Ambulatory Visit  Name: Randee Leiva      : 1990      MRN: [id number]  Encounter Provider: Christy Lu PA-C  Encounter Date: 10/4/2024   Encounter department: [facility]    Assessment & Plan  Well adult exam  -Flu vaccine recommended but patient prefers to hold at this time  - She would like to try the nicotine patches again which have been beneficial but she was confused about calling for the lower doses  - Routine physical in 1 year    Tobacco Cessation Counseling: Tobacco cessation counseling and education was provided. The patient is sincerely urged to quit consumption of tobacco. She is ready to quit tobacco. The numerous health risks of tobacco consumption were discussed. Prescribed the following medications: nicotine patch.. I spent 10 minutes on Tobacco Cessation counseling during today's visit.          Cigarette nicotine dependence without complication  -As stated above  - She has been advised that when she feels the last 2 weeks of the patch 21 mg she should then send me a message requesting the 14 mg for 6 weeks and then thereafter she will then need the 7 mg for 6 weeks  Orders:    nicotine (NICODERM CQ) 21 mg/24 hr TD 24 hr patch; Place 1 patch on the skin over 24 hours every 24 hours    Gastroesophageal reflux disease, unspecified whether esophagitis present  -I did put a referral in the system for her to follow-up with gastroenterology for reflux  Orders:    Ambulatory Referral to Gastroenterology; Future    Chest pain, unspecified type  -I did recommend an EKG today but she states that she could not wait because she has to  her son from school  - She has had ongoing left upper chest pain for quite a few years.  She did see cardiology in  at [facility] who basically told her it was not cardiac at that time.  When she recently went to physical therapy she discussed the chest pain again and it was recommended she see cardiology  again.  Orders:    Ambulatory referral to Cardiology; Future    Shortness of breath  -She will continue with the rescue inhaler as needed which is infrequent.  If utilizing more than twice a week recommend follow-up  - Smoking cessation highly recommended  Orders:    albuterol (PROVENTIL HFA,VENTOLIN HFA) 90 mcg/act inhaler; Inhale 2 puffs every 6 (six) hours as needed for shortness of breath    -Recommend follow-up in 4 months    M*Modal software was used to dictate this note. It may contain errors with dictating incorrect words/spelling. Please contact provider directly for any questions.        History of Present Illness     Patient presents today for annual physical along with a routine follow-up.    Wellness:  -She states that she does see the dentist at least twice a year  -Denies any vision problems  - She does eat a healthier diet which could be the reason why she has been losing some weight  - She does exercise at the gym at least 2-3 times a week  - She does continue to smoke half pack of cigarettes daily.  She states that she was noticing some benefit with the nicotine patch but there was some confusion on utilization and refills of the patch.  -Denies any vaping, drug use or alcohol use    Routine:  -She states recently she went to physical therapy as per the recommendation of orthopedic surgery for left shoulder pain.  She states when she was talking to the therapist she told him about her chest pain and they both agreed that she should see cardiology before she proceeds with continued PT  -She also states that she needs a new referral for the gastroenterologist.  She states has been over a year since she seen the specialist for her reflux  - She utilizes the inhaler, Ventolin, as needed which is infrequent.          Review of Systems   Cardiovascular:  Positive for chest pain.           Objective     /70 (BP Location: Left arm, Patient Position: Sitting, Cuff Size: Adult)   Pulse 70   Temp (!)  "97.4 °F (36.3 °C) (Tympanic)   Resp 18   Ht 5' 7\" (1.702 m)   Wt 74.4 kg (164 lb)   SpO2 96%   BMI 25.69 kg/m²     Physical Exam  Vitals reviewed.   Constitutional:       General: She is not in acute distress.     Appearance: Normal appearance. She is well-developed. She is not ill-appearing, toxic-appearing or diaphoretic.   HENT:      Head: Normocephalic and atraumatic.   Neck:      Thyroid: No thyromegaly.   Cardiovascular:      Rate and Rhythm: Normal rate and regular rhythm.      Heart sounds: Normal heart sounds. No murmur heard.  Pulmonary:      Effort: Pulmonary effort is normal. No respiratory distress.      Breath sounds: Normal breath sounds. No wheezing, rhonchi or rales.   Abdominal:      General: Bowel sounds are normal.      Palpations: Abdomen is soft. There is no mass.      Tenderness: There is no abdominal tenderness.   Musculoskeletal:         General: No deformity.      Cervical back: Neck supple.      Right lower leg: No edema.      Left lower leg: No edema.   Lymphadenopathy:      Cervical: No cervical adenopathy.   Skin:     General: Skin is warm.   Neurological:      General: No focal deficit present.      Mental Status: She is alert.   Psychiatric:         Mood and Affect: Mood normal.         Behavior: Behavior normal.         Thought Content: Thought content normal.         Judgment: Judgment normal.         "

## 2024-10-15 ENCOUNTER — OFFICE VISIT (OUTPATIENT)
Dept: NEUROLOGY | Facility: CLINIC | Age: 34
End: 2024-10-15
Payer: MEDICARE

## 2024-10-15 VITALS
WEIGHT: 164 LBS | OXYGEN SATURATION: 98 % | HEIGHT: 67 IN | BODY MASS INDEX: 25.74 KG/M2 | HEART RATE: 75 BPM | SYSTOLIC BLOOD PRESSURE: 116 MMHG | TEMPERATURE: 98 F | DIASTOLIC BLOOD PRESSURE: 70 MMHG

## 2024-10-15 DIAGNOSIS — R20.0 HEMISENSORY LOSS: ICD-10-CM

## 2024-10-15 DIAGNOSIS — R20.2 PARESTHESIAS: ICD-10-CM

## 2024-10-15 DIAGNOSIS — R26.89 IMBALANCE: Primary | ICD-10-CM

## 2024-10-15 DIAGNOSIS — R53.1 RIGHT SIDED WEAKNESS: ICD-10-CM

## 2024-10-15 PROCEDURE — 99214 OFFICE O/P EST MOD 30 MIN: CPT | Performed by: NURSE PRACTITIONER

## 2024-10-15 NOTE — PROGRESS NOTES
Ambulatory Visit  Name: Randee Leiva      : 1990      MRN: [id number]  Encounter Provider: CINTHYA Cohen  Encounter Date: 10/15/2024   Encounter department: NEUROLOGY [facility]    Assessment & Plan  Imbalance  Reports imbalance, may be due to chronic SI joint pain, will check b12  Orders:    Vitamin B12; Future    Paresthesias  Patient with symptoms of right sided numbness and weakness since a fall that occurred in 2019.  She has felt progression of her weakness and does feel she has difficulty with coordination on the right side and changes in gait and is losing muscle mass on that side.    On exam she has normal muscle strength testing that is symmetric.  She does have hemisensory loss of the right side.  She does continue with left leg pain that is likely due to sacroiliitis and is following with pain management and chiropractor with some benefit.    She did have recent EMG of the bilateral upper extremities that was normal in which patient was reassured.  Given her numbness will check B12 levels.  Will also repeat MRI brain and cervical spine due to hemisensory loss to rule out any central process.     Plan for follow up in 4 months. To contact the office sooner with any concerns or worsening symptoms.               History of Present Illness   HPI  patient is a 33 year-old woman who presents for follow up forright-sided paresthesias and headaches.     Last office visit 3/2022 in which she was started on amitriptyline, obtain mri lumbar spine and referred to PT.    Interval History:  She continues with right sided symptoms. She feels she is weaker on the right side. She feels she has issues with coordination of the right side.  She feels she is losing muscle mass on that side.   No falls since last visit.  She has numbness of the entire right side. She has some tingling in the right hand but nothing in the leg.   She had neck pain on the left that radiates into her neck and  into her head, none on the right.   No back pain, has pain in the left SI joint into her hip and down the leg.       She continues to have pain in the left leg, seeing chiropractor and pain management. She did have injection in the SI joint that was helpful for a short period of time. She is also getting massage.     She is getting carpal tunnel surgery on right in January.      MRI lumbar spine 3/2022: No central or foraminal narrowing.  Minimal annular bulge at L4-5.    MRI brain 8/2021: normal     EMG LLE and b/l UE 3/2023: normal   EMG RUE 9/2021: Abnormal study. These electrodiagnostic findings are most consistent with a mild, median  mononeuropathy across the right wrist (carpal tunnel syndrome).  There is no definite electrophysiologic evidence to support a cervical radiculopathy in the right upper  extremity.       Prior hx:  To review, Patient reports she had a fall about 2 years ago, she missed a step, and landed on her coccyx.  Since then, she did have pain in her lower back.  Sometime after this injury, she started noticing numbness in the right hand.  It was initially predominantly on the volar aspect of the lower right wrist, that would radiate up her forearm, however over the last 2 years, it has evolved some and would go all the way to include her whole right upper extremity.  She does not have any neck pain, but does report tightness in her neck, did try physical therapy. Felt some loss of strength in the right arm. Wrist brace made symptoms worse.     In addition, started noticing numbness in the right lower extremity, which has been more prominent for her over the last 1 year.  She denies any radicular symptoms in the right leg, numbness is mainly in the sole, along the medial malleolus, and anteromedial aspect of the left lower leg. Also some abnormal sensations int he right face. Noted to have daily headaches as well.  Recently started on atarax for her anxiety. Was prescribed buspar but did not  "take.     Review of Systems  Review of Systems   Constitutional:  Negative for appetite change, fatigue and fever.   HENT: Negative.  Negative for hearing loss, tinnitus, trouble swallowing and voice change.    Eyes: Negative.  Negative for photophobia, pain and visual disturbance.   Respiratory: Negative.  Negative for shortness of breath.    Cardiovascular: Negative.  Negative for palpitations.   Gastrointestinal: Negative.  Negative for nausea and vomiting.   Endocrine: Negative.  Negative for cold intolerance.   Genitourinary: Negative.  Negative for dysuria, frequency and urgency.   Musculoskeletal:  Positive for gait problem (balance issues- sways/ shuffles feet). Negative for back pain, myalgias, neck pain and neck stiffness.   Skin: Negative.  Negative for rash.   Allergic/Immunologic: Negative.    Neurological:  Positive for weakness (right sided) and numbness (right sided). Negative for dizziness, tremors, seizures, syncope, facial asymmetry, speech difficulty, light-headedness and headaches.        Right sided muscle loss     Hematological: Negative.  Does not bruise/bleed easily.   Psychiatric/Behavioral: Negative.  Negative for confusion, hallucinations and sleep disturbance.    All other systems reviewed and are negative.     I have personally reviewed the MA's review of systems and made changes as necessary.      Objective     /70 (BP Location: Left arm, Patient Position: Sitting, Cuff Size: Adult)   Pulse 75   Temp 98 °F (36.7 °C) (Temporal)   Ht 5' 7\" (1.702 m)   Wt 74.4 kg (164 lb)   SpO2 98%   BMI 25.69 kg/m²     Physical Exam  Eyes:      Extraocular Movements: EOM normal.   Neurological:      Mental Status: She is oriented to person, place, and time.      Coordination: Finger-Nose-Finger Test and Heel to Shin Test normal.      Gait: Gait is intact. Tandem walk normal.      Deep Tendon Reflexes:      Reflex Scores:       Bicep reflexes are 2+ on the right side and 2+ on the left " side.       Brachioradialis reflexes are 2+ on the right side and 2+ on the left side.       Patellar reflexes are 0 on the right side and 0 on the left side.       Achilles reflexes are 2+ on the right side and 2+ on the left side.  Psychiatric:         Speech: Speech normal.       Neurologic Exam     Mental Status   Oriented to person, place, and time.   Attention: normal.   Speech: speech is normal   Level of consciousness: alert  Knowledge: good.     Cranial Nerves     CN III, IV, VI   Extraocular motions are normal.     CN V   Right facial sensation deficit: complete (diminished on the right)  Left facial sensation deficit: none    CN VII   Facial expression full, symmetric.     CN VIII   Hearing: intact    CN XI   Right trapezius strength: normal  Left trapezius strength: normal    CN XII   Tongue deviation: none    Motor Exam   Muscle bulk: normal    Strength   Strength 5/5 except as noted.  strength 5-/5 on the right     Sensory Exam   Light touch normal.   Vibration normal.   Right leg proprioception: normal  Left leg proprioception: normal  Pinprick diminished in the right upper and right foot vs left  Temperature diminished in the RUE and RLE vs left      Gait, Coordination, and Reflexes     Gait  Gait: normal    Coordination   Finger to nose coordination: normal  Heel to shin coordination: normal  Tandem walking coordination: normal    Reflexes   Right brachioradialis: 2+  Left brachioradialis: 2+  Right biceps: 2+  Left biceps: 2+  Right patellar: 0  Left patellar: 0  Right achilles: 2+  Left achilles: 2+  Right plantar: normal  Left plantar: normal  Right Swift: absent  Left Swift: absent  Right ankle clonus: absent  Left ankle clonus: absent

## 2024-10-15 NOTE — PROGRESS NOTES
Review of Systems   Constitutional:  Negative for appetite change, fatigue and fever.   HENT: Negative.  Negative for hearing loss, tinnitus, trouble swallowing and voice change.    Eyes: Negative.  Negative for photophobia, pain and visual disturbance.   Respiratory: Negative.  Negative for shortness of breath.    Cardiovascular: Negative.  Negative for palpitations.   Gastrointestinal: Negative.  Negative for nausea and vomiting.   Endocrine: Negative.  Negative for cold intolerance.   Genitourinary: Negative.  Negative for dysuria, frequency and urgency.   Musculoskeletal:  Positive for gait problem (balance issues- sways/ shuffles feet). Negative for back pain, myalgias, neck pain and neck stiffness.   Skin: Negative.  Negative for rash.   Allergic/Immunologic: Negative.    Neurological:  Positive for weakness (right sided) and numbness (right sided). Negative for dizziness, tremors, seizures, syncope, facial asymmetry, speech difficulty, light-headedness and headaches.        Right sided muscle loss     Hematological: Negative.  Does not bruise/bleed easily.   Psychiatric/Behavioral: Negative.  Negative for confusion, hallucinations and sleep disturbance.    All other systems reviewed and are negative.

## 2024-10-15 NOTE — ASSESSMENT & PLAN NOTE
Patient with symptoms of right sided numbness and weakness since a fall that occurred in 2019.  She has felt progression of her weakness and does feel she has difficulty with coordination on the right side and changes in gait and is losing muscle mass on that side.    On exam she has normal muscle strength testing that is symmetric.  She does have hemisensory loss of the right side.  She does continue with left leg pain that is likely due to sacroiliitis and is following with pain management and chiropractor with some benefit.    She did have recent EMG of the bilateral upper extremities that was normal in which patient was reassured.  Given her numbness will check B12 levels.  Will also repeat MRI brain and cervical spine due to hemisensory loss to rule out any central process.     Plan for follow up in 4 months. To contact the office sooner with any concerns or worsening symptoms.

## 2024-11-15 ENCOUNTER — PROCEDURE VISIT (OUTPATIENT)
Age: 34
End: 2024-11-15
Payer: MEDICARE

## 2024-11-15 VITALS
SYSTOLIC BLOOD PRESSURE: 116 MMHG | HEIGHT: 67 IN | DIASTOLIC BLOOD PRESSURE: 64 MMHG | WEIGHT: 164 LBS | BODY MASS INDEX: 25.74 KG/M2 | HEART RATE: 65 BPM | OXYGEN SATURATION: 98 %

## 2024-11-15 DIAGNOSIS — G89.29 CHRONIC LEFT HIP PAIN: ICD-10-CM

## 2024-11-15 DIAGNOSIS — M99.03 SEGMENTAL DYSFUNCTION OF LUMBAR REGION: ICD-10-CM

## 2024-11-15 DIAGNOSIS — M25.552 CHRONIC LEFT HIP PAIN: ICD-10-CM

## 2024-11-15 DIAGNOSIS — M99.01 SEGMENTAL DYSFUNCTION OF CERVICAL REGION: ICD-10-CM

## 2024-11-15 DIAGNOSIS — M99.02 SEGMENTAL DYSFUNCTION OF THORACIC REGION: ICD-10-CM

## 2024-11-15 DIAGNOSIS — M54.2 NECK PAIN: ICD-10-CM

## 2024-11-15 DIAGNOSIS — M54.16 LUMBAR RADICULOPATHY: ICD-10-CM

## 2024-11-15 DIAGNOSIS — M99.04 SEGMENTAL DYSFUNCTION OF SACRAL REGION: ICD-10-CM

## 2024-11-15 DIAGNOSIS — M54.59 MECHANICAL LOW BACK PAIN: Primary | ICD-10-CM

## 2024-11-15 PROCEDURE — 98941 CHIROPRACT MANJ 3-4 REGIONS: CPT | Performed by: CHIROPRACTOR

## 2024-11-15 NOTE — PROGRESS NOTES
Initial date of service: 7/1/24    Diagnoses and all orders for this visit:    Mechanical low back pain    Lumbar radiculopathy    Chronic left hip pain    Segmental dysfunction of lumbar region    Segmental dysfunction of sacral region    Neck pain    Segmental dysfunction of cervical region    Segmental dysfunction of thoracic region       ASSESSMENT:  Pt's symptoms and exam findings consistent with mechanical lbp complicated by mechanical neck pain secondary to repetitive st/sp injury, exacerbated by postural/ergonomic stressors. Pt responded well to flexion biased stretches and manual mobilization of the affected spinal and myofascial tissues with increased ROM; trial of conservative tx recommended consisting of stretching, graded mobilization/manipulation of the affected spinal and myofascial jt dysfunction, postural/ergonomic education and take home stretches/exercises. If symptoms fail to improve with short trial of conservative care, appropriate imaging and referral will be coordinated.  10/4-The pt status is slightly better from 10/10 last week to 8/10 this week. Pt was 8 mins late for appt, so we concentrated on lower back at this time.   11/15- The patient tolerated treatment fairly well, She has appt with pain management next week, but pt already mentioned she needs to cancel it.     PROCEDURE CODES: 51436    TREATMENT:  Fear avoidance behavior discussion; encouraged and reassured pt that natural course of condition is to improve over time with adherence to tx plan and home care strategies. Home care recommendations: avoid bed rest, walk (but avoid trails and uneven surfaces), gradual return to activity to tolerance (avoid anything that peripheralizes symptoms), call if symptoms peripheralize, worsen, or neurologic deficit progresses. Ther-ex: IASTM; discussed post procedure soreness and/or ecchymosis for up to 36 hrs, applied to affected mm hypertonicities; supine hamstring stretch, supine gluteal  stretch, side laying QL stretch, single knee to chest stretch, hip flexor pin-and-stretch, alternating prone hip extension, glute bridge, transitional mvmt education, abdominal bracing; greater than 15 min spent performing above mentioned ther-ex to improve ROM/flexibility. Cervical Manual traction, Thoracic mobilization/manipulation: prone P-A mob; Lumbar mobilization/manipulation: diversified side laying graded HVLA, flexion-traction; Sacrum- Long axis traction SIJ Manipulation/Mobilization: L SIJ HVLA - long axis distraction, marie drop table maneuver to affected SIJ    HPI:  Randee Leiva is a 34 y.o. female  Chief Complaint   Patient presents with    Neck Pain     Neck pain-8    Back Pain     Lower back pain-8    Sciatica     The patient presents to the office with L leg and lower back pain, the patient reports the pain started about a year ago without trauma but mentions she had a job for three years where it was L side dominant. The patient reports the L side is throbbing and pain but the R side is numbness. Went to Pain management and injection helped for two months but then the pain came back worse. The patient was referred to our office by CINTHYA Andino, Other previous treatment includes PT and massage in the past but helped a little temporarily. The patient exercises- 1 mile every day, gym- all the machines- whatever she likes.  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 Pt also mentions she has neck pain and shoulder issue, but Ortho is treating shoulder. MRI 2022 Lumbar- Mild disc bulge L4-5 without central canal or foraminal narrowing.   8/5- The patient presents to the office with lower back pain was a little better for a little after her last visit but its back to the same now.   8/12- The patient is feeling sore in  the pain in the posterior thigh since last visit.  8/30- The patient has 3 days of improvements after last visit but then she has her leg screaming at her this week.  9/6- The patient feels the same today but mentions improvements for several days after last visit.  9/23- The patient is feeling is 10/10 pain  10/4- The patient reports pain that is 8/10  11/15- The patient reports the pain is now the L side of the hip and down into the anterior knee.    Neck Pain   Associated symptoms include leg pain.   Back Pain  Associated symptoms include leg pain.   Leg Pain     Past Medical History:   Diagnosis Date    Acne     Allergic     Anxiety     Asthma     Depression     GERD (gastroesophageal reflux disease)     Hypertension       History reviewed. No pertinent surgical history.  The following portions of the patient's history were reviewed and updated as appropriate: allergies, past family history, past medical history, past social history, past surgical history, and problem list.  Review of Systems   Musculoskeletal:  Positive for back pain, myalgias, neck pain and neck stiffness.     Physical Exam  Musculoskeletal:         General: Tenderness present. Normal range of motion.      Cervical back: Normal range of motion. Rigidity and tenderness present.        Back:         Legs:    Skin:     Findings: No erythema.   Neurological:      Mental Status: She is oriented to person, place, and time.      Gait: Gait is intact.      Deep Tendon Reflexes: Reflexes are normal and symmetric.   Psychiatric:         Attention and Perception: Attention normal.         Mood and Affect: Affect normal.         Speech: Speech normal.         Behavior: Behavior is cooperative.         Cognition and Memory: Cognition normal.     SOFT TISSUE ASSESSMENT Hypertonicity and tenderness palpated B C6-T1, Upper trap, lev scap, SCM  T10-S1 erector spinae, hip flexor, glute med/min, QL, hamstring JOINT RESTRICTIONS: C6-T1,  T10-S1 and L SIJ ORTHO:  SI jt point tenderness: +; Brook unremarkable for centralization/peripheralization; flaco's, iliac compression, thigh thrust elicit lbp in R/L SIJ; prone femoral nerve stretch neg for upper lumbar neural tension, elicits L SIJ stiffness; sitting root elicits no lbp on R/L; slump test elicits no neural tension R/L, Spurlings- neg, Distraction- neg, Max foraminal compression- neg    Return in about 1 week (around 11/22/2024) for Recheck.

## 2024-12-05 ENCOUNTER — TELEPHONE (OUTPATIENT)
Dept: OBGYN CLINIC | Facility: CLINIC | Age: 34
End: 2024-12-05

## 2024-12-10 NOTE — TELEPHONE ENCOUNTER
Patient called me back and said she is going to have to look at her work schedule and call  to the end of the year.

## 2024-12-10 NOTE — TELEPHONE ENCOUNTER
Left another message asking the patient to call the office to reschedule her surgery. I also said in my message that I will be canceling the surgery.

## 2025-01-06 ENCOUNTER — OFFICE VISIT (OUTPATIENT)
Dept: CARDIOLOGY CLINIC | Facility: CLINIC | Age: 35
End: 2025-01-06
Payer: MEDICARE

## 2025-01-06 VITALS
HEART RATE: 68 BPM | SYSTOLIC BLOOD PRESSURE: 112 MMHG | DIASTOLIC BLOOD PRESSURE: 66 MMHG | WEIGHT: 164 LBS | BODY MASS INDEX: 25.69 KG/M2

## 2025-01-06 DIAGNOSIS — R07.9 CHEST PAIN, UNSPECIFIED TYPE: ICD-10-CM

## 2025-01-06 PROCEDURE — 93000 ELECTROCARDIOGRAM COMPLETE: CPT | Performed by: INTERNAL MEDICINE

## 2025-01-06 PROCEDURE — 99244 OFF/OP CNSLTJ NEW/EST MOD 40: CPT | Performed by: INTERNAL MEDICINE

## 2025-01-06 NOTE — PROGRESS NOTES
Cardiology Consultation     Randee Leiva  [id number]  1990  [facility]  [address] [place] [address]  [place] [id number]    Orders Placed This Encounter   Procedures    Stress test only, exercise    POCT ECG         Assessment & Plan  Chest pain, unspecified type  Chest pain which is recurrence somewhat persistent in a patient with a history of smoking. She has been advised to get a cardiac evaluation before working with the physical therapist.    Echocardiogram previously done with another cardiologist who she saw a few years back was unremarkable.    Recommend checking an exercise treadmill test. If unremarkable, no additional cardiac testing or follow-up is necessary and she can perform physical therapy and other exercises without any limitations.    She has no hypertension. Blood pressure seems to be well-controlled. There was an isolated elevated blood pressure reading and she had seen a neurologist for headaches in the past was put on propranolol but was taken off of this.        History of Present Illness:    34-year-old female. Current smoker but motivated to quit. Referred to the office today for chest pain. She was referred to a physical therapist given concern for shoulder injury, but she was advised to see a cardiologist before starting the therapy.    Patient reports pain in her chest which has been evaluated previously by cardiologist a few years back. Sounds similar in nature then. Was deemed to be musculoskeletal she had an echocardiogram done but no ischemic evaluation.    There is no strong family history of any heart disease. Patient denies any other cardiac evaluation other than the echo which was noted.    Her EKG in the office today is unremarkable.    She likes to do cardio exercises. She does some treadmill, elliptical, rowing machine.        Patient Active Problem List   Diagnosis    Anxiety  state    Migraine without aura, with intractable migraine, so stated, with status migrainosus    Right hand pain    Carpal tunnel syndrome of right wrist    Laryngopharyngeal reflux (LPR)    Gastroesophageal reflux disease    Right leg paresthesias    Paresthesias    Chronic headaches    Numbness and tingling in right hand    Arthralgia of right temporomandibular joint    Neck pain    Skin lesion    COVID-19 virus infection    Musculoskeletal chest pain    Leukopenia    Hypocalcemia    Lumbar radiculitis    Cigarette nicotine dependence without complication    Shortness of breath    Low back pain with sciatica    Carpal tunnel syndrome on left    Bilateral foot pain    Eosinophilia    Abnormal PFT    Sacroiliitis (HCC)    BMI 28.0-28.9,adult    Weakness of right arm    Weakness of right leg    Chronic left shoulder pain    Right leg pain     Past Medical History:   Diagnosis Date    Acne     Allergic     Anxiety     Asthma     Depression     GERD (gastroesophageal reflux disease)     Hypertension      Social History     Tobacco Use    Smoking status: Heavy Smoker     Current packs/day: 0.50     Average packs/day: 0.5 packs/day for 1.2 years (0.6 ttl pk-yrs)     Types: Cigarettes     Passive exposure: Current    Smokeless tobacco: Current    Tobacco comments:     8 cigarettes per day, No passive smoke exposure   Vaping Use    Vaping status: Never Used   Substance Use Topics    Alcohol use: Not Currently    Drug use: Never      Family History   Problem Relation Age of Onset    Cancer Father         non hodgkins lymphoma    Cancer Paternal Grandmother     Cancer Paternal Grandfather      No past surgical history on file.    Current Outpatient Medications:     albuterol (PROVENTIL HFA,VENTOLIN HFA) 90 mcg/act inhaler, Inhale 2 puffs every 6 (six) hours as needed for shortness of breath, Disp: 18 g, Rfl: 0    nicotine (NICODERM CQ) 21 mg/24 hr TD 24 hr patch, Place 1 patch on the skin over 24 hours every 24 hours  "(Patient not taking: Reported on 10/15/2024), Disp: 14 patch, Rfl: 2  Allergies   Allergen Reactions    No Known Allergies        Vitals:    01/06/25 1501   BP: 112/66   BP Location: Right arm   Patient Position: Sitting   Cuff Size: Standard   Pulse: 68   Weight: 74.4 kg (164 lb)     Vitals:    01/06/25 1501   Weight: 74.4 kg (164 lb)          Body mass index is 25.69 kg/m².    Physical Exam:   GENERAL: Alert, well appearing, and in no distress  HEENT:  PERRL, EOMI, no scleral icterus, no conjunctival pallor  NECK:  Supple, No elevated JVP, no thyromegaly, no carotid bruits  HEART:  Regular rate and rhythm, normal S1/S2, no S3/S4, no murmur or rub  LUNGS:  Clear to auscultation bilaterally  ABDOMEN:  Soft, non-tender, positive bowel sounds, no rebound or guarding  EXTREMITIES:  No edema  VASCULAR:  Normal pedal pulses   NEURO: No focal deficits,  SKIN: Normal without suspicious lesions on exposed skin      ROS:  Except as noted in HPI, is otherwise reviewed in detail and a 12 point review of systems is negative.    Labs:  Lab Results   Component Value Date    SODIUM 138 10/24/2022    K 4.4 10/24/2022     (H) 10/24/2022    CREATININE 0.71 10/24/2022    BUN 13 10/24/2022    CO2 24 10/24/2022    ALT 28 10/24/2022    AST 11 10/24/2022    GLUF 93 10/24/2022    WBC 4.85 10/24/2022    HGB 13.7 10/24/2022    HCT 42.4 10/24/2022     10/24/2022       No results found for: \"CHOL\"  Lab Results   Component Value Date    HDL 40 (L) 10/24/2022    HDL 48 (L) 03/09/2022    HDL 51 01/12/2021     Lab Results   Component Value Date    LDLCALC 95 10/24/2022    LDLCALC 100 03/09/2022    LDLCALC 77 01/12/2021     Lab Results   Component Value Date    TRIG 49 10/24/2022    TRIG 65 03/09/2022    TRIG 47 01/12/2021       EKG:  Sinus rhythm, 68 BPM.    "

## 2025-01-17 ENCOUNTER — HOSPITAL ENCOUNTER (OUTPATIENT)
Dept: NON INVASIVE DIAGNOSTICS | Facility: CLINIC | Age: 35
Discharge: HOME | End: 2025-01-17

## 2025-01-24 ENCOUNTER — HOSPITAL ENCOUNTER (OUTPATIENT)
Dept: NON INVASIVE DIAGNOSTICS | Facility: CLINIC | Age: 35
Discharge: HOME | End: 2025-01-24
Payer: MEDICARE

## 2025-01-24 ENCOUNTER — RESULTS FOLLOW-UP (OUTPATIENT)
Dept: CARDIOLOGY CLINIC | Facility: CLINIC | Age: 35
End: 2025-01-24

## 2025-01-24 VITALS
HEIGHT: 67 IN | DIASTOLIC BLOOD PRESSURE: 70 MMHG | HEART RATE: 77 BPM | OXYGEN SATURATION: 98 % | SYSTOLIC BLOOD PRESSURE: 100 MMHG | WEIGHT: 164 LBS | BODY MASS INDEX: 25.74 KG/M2

## 2025-01-24 DIAGNOSIS — R07.9 CHEST PAIN, UNSPECIFIED TYPE: ICD-10-CM

## 2025-01-24 LAB
CHEST PAIN STATEMENT: NORMAL
MAX DIASTOLIC BP: 70 MMHG
MAX HR PERCENT: 86 %
MAX HR: 162 BPM
MAX PREDICTED HEART RATE: 187 BPM
PROTOCOL NAME: NORMAL
RATE PRESSURE PRODUCT: NORMAL
SL CV STRESS RECOVERY BP: NORMAL MMHG
SL CV STRESS RECOVERY HR: 84 BPM
SL CV STRESS RECOVERY O2 SAT: 98 %
SL CV STRESS STAGE REACHED: 4
STRESS ANGINA INDEX: 0
STRESS BASELINE BP: NORMAL MMHG
STRESS BASELINE HR: 77 BPM
STRESS O2 SAT REST: 98 %
STRESS PEAK HR: 162 BPM
STRESS POST ESTIMATED WORKLOAD: 13.4 METS
STRESS POST EXERCISE DUR MIN: 10 MIN
STRESS POST EXERCISE DUR MIN: 10 MIN
STRESS POST EXERCISE DUR SEC: 30 SEC
STRESS POST EXERCISE DUR SEC: 30 SEC
STRESS POST O2 SAT PEAK: 98 %
STRESS POST PEAK BP: 160 MMHG
STRESS POST PEAK HR: 162 BPM
STRESS POST PEAK SYSTOLIC BP: 160 MMHG
TARGET HR FORMULA: NORMAL
TEST INDICATION: NORMAL

## 2025-01-24 PROCEDURE — 93016 CV STRESS TEST SUPVJ ONLY: CPT | Performed by: STUDENT IN AN ORGANIZED HEALTH CARE EDUCATION/TRAINING PROGRAM

## 2025-01-24 PROCEDURE — 93018 CV STRESS TEST I&R ONLY: CPT | Performed by: STUDENT IN AN ORGANIZED HEALTH CARE EDUCATION/TRAINING PROGRAM

## 2025-01-24 PROCEDURE — 93017 CV STRESS TEST TRACING ONLY: CPT

## 2025-01-27 LAB
CHEST PAIN STATEMENT: NORMAL
MAX DIASTOLIC BP: 70 MMHG
MAX PREDICTED HEART RATE: 187 BPM
PROTOCOL NAME: NORMAL
STRESS POST EXERCISE DUR MIN: 10 MIN
STRESS POST EXERCISE DUR SEC: 30 SEC
STRESS POST PEAK HR: 162 BPM
STRESS POST PEAK SYSTOLIC BP: 160 MMHG
TARGET HR FORMULA: NORMAL
TEST INDICATION: NORMAL

## 2025-02-01 ENCOUNTER — HOSPITAL ENCOUNTER (OUTPATIENT)
Facility: MEDICAL CENTER | Age: 35
Discharge: HOME | End: 2025-02-01
Payer: MEDICARE

## 2025-02-01 DIAGNOSIS — R53.1 RIGHT SIDED WEAKNESS: ICD-10-CM

## 2025-02-01 DIAGNOSIS — R20.0 HEMISENSORY LOSS: ICD-10-CM

## 2025-02-01 PROCEDURE — A9585 GADOBUTROL INJECTION: HCPCS | Performed by: NURSE PRACTITIONER

## 2025-02-01 PROCEDURE — 70553 MRI BRAIN STEM W/O & W/DYE: CPT

## 2025-02-01 PROCEDURE — 72156 MRI NECK SPINE W/O & W/DYE: CPT

## 2025-02-01 RX ORDER — GADOBUTROL 604.72 MG/ML
7 INJECTION INTRAVENOUS
Status: COMPLETED | OUTPATIENT
Start: 2025-02-01 | End: 2025-02-01

## 2025-02-01 RX ADMIN — GADOBUTROL 7 ML: 604.72 INJECTION INTRAVENOUS at 16:38

## 2025-02-03 ENCOUNTER — RESULTS FOLLOW-UP (OUTPATIENT)
Dept: NEUROLOGY | Facility: CLINIC | Age: 35
End: 2025-02-03

## 2025-02-11 ENCOUNTER — OFFICE VISIT (OUTPATIENT)
Dept: URGENT CARE | Age: 35
End: 2025-02-11
Payer: MEDICARE

## 2025-02-11 VITALS
RESPIRATION RATE: 18 BRPM | HEART RATE: 68 BPM | OXYGEN SATURATION: 99 % | DIASTOLIC BLOOD PRESSURE: 79 MMHG | TEMPERATURE: 100.8 F | SYSTOLIC BLOOD PRESSURE: 108 MMHG

## 2025-02-11 DIAGNOSIS — J02.0 STREP PHARYNGITIS: Primary | ICD-10-CM

## 2025-02-11 DIAGNOSIS — J02.9 SORE THROAT: ICD-10-CM

## 2025-02-11 LAB — S PYO AG THROAT QL: POSITIVE

## 2025-02-11 PROCEDURE — 87880 STREP A ASSAY W/OPTIC: CPT

## 2025-02-11 PROCEDURE — 99214 OFFICE O/P EST MOD 30 MIN: CPT

## 2025-02-11 RX ORDER — AMOXICILLIN 500 MG/1
500 CAPSULE ORAL EVERY 12 HOURS SCHEDULED
Qty: 20 CAPSULE | Refills: 0 | Status: SHIPPED | OUTPATIENT
Start: 2025-02-11 | End: 2025-02-21

## 2025-02-11 RX ORDER — IBUPROFEN 400 MG/1
400 TABLET, FILM COATED ORAL ONCE
Status: COMPLETED | OUTPATIENT
Start: 2025-02-11 | End: 2025-02-11

## 2025-02-11 RX ADMIN — IBUPROFEN 400 MG: 400 TABLET, FILM COATED ORAL at 17:00

## 2025-02-11 NOTE — PROGRESS NOTES
[facility]        NAME: Randee Leiva is a 34 y.o. female  : 1990    MRN: [id number]  DATE: 2025  TIME: 6:50 PM    Assessment and Plan   Strep pharyngitis [J02.0]  1. Strep pharyngitis  amoxicillin (AMOXIL) 500 mg capsule      2. Sore throat  POCT rapid ANTIGEN strepA    ibuprofen (MOTRIN) tablet 400 mg        Your in office strep test is POSITIVE today.    Please complete full 10 days of antibiotic therapy.  After 3 days of antibiotic therapy, please discard your current toothbrush and begin using a new one.     Do not share glasses, utensils, dishes while on the antibiotics.  Use hot water or the  to clean house hold items.     Tylenol or Motrin for pain fever.  Continue with warm salt water gargles and drink warm tea with honey for additional soothing measures.     Patient Instructions       Follow up with PCP in 3-5 days.  Proceed to  ER if symptoms worsen.    If tests have been performed at [facility], our office will contact you with results if changes need to be made to the care plan discussed with you at the visit.  You can review your full results on [facility].    Chief Complaint     Chief Complaint   Patient presents with   • Sore Throat     Patient states that her throat is swollen and painful since yesterday morning. She notes body aches and pain when swallowing.          History of Present Illness       Patient is a 34-year-old female presenting with 2 days of sore throat and chills.  She denies cough, congestion, runny nose.  She is drinking okay, however has decreased appetite.  She denies nausea vomiting diarrhea, no rashes.  She has taken Tylenol for her symptoms without relief.  She denies sick contacts    Sore Throat   Pertinent negatives include no abdominal pain, congestion, coughing, shortness of breath, trouble swallowing or vomiting.       Review of Systems   Review of Systems   Constitutional:  Positive for appetite change and fever.  Negative for activity change.   HENT:  Positive for sore throat. Negative for congestion, postnasal drip, rhinorrhea and trouble swallowing.    Respiratory:  Negative for cough, chest tightness and shortness of breath.    Gastrointestinal:  Negative for abdominal pain, nausea and vomiting.         Current Medications       Current Outpatient Medications:   •  albuterol (PROVENTIL HFA,VENTOLIN HFA) 90 mcg/act inhaler, Inhale 2 puffs every 6 (six) hours as needed for shortness of breath, Disp: 18 g, Rfl: 0  •  amoxicillin (AMOXIL) 500 mg capsule, Take 1 capsule (500 mg total) by mouth every 12 (twelve) hours for 10 days, Disp: 20 capsule, Rfl: 0  •  nicotine (NICODERM CQ) 21 mg/24 hr TD 24 hr patch, Place 1 patch on the skin over 24 hours every 24 hours (Patient not taking: Reported on 10/15/2024), Disp: 14 patch, Rfl: 2    Current Facility-Administered Medications:   •  ibuprofen (MOTRIN) tablet 400 mg, 400 mg, Oral, Once,     Current Allergies     Allergies as of 02/11/2025 - Reviewed 02/11/2025   Allergen Reaction Noted   • No known allergies  10/17/2017            The following portions of the patient's history were reviewed and updated as appropriate: allergies, current medications, past family history, past medical history, past social history, past surgical history and problem list.     Past Medical History:   Diagnosis Date   • Acne    • Allergic    • Anxiety    • Asthma    • Depression    • GERD (gastroesophageal reflux disease)    • Hypertension        History reviewed. No pertinent surgical history.    Family History   Problem Relation Age of Onset   • Cancer Father         non hodgkins lymphoma   • Cancer Paternal Grandmother    • Cancer Paternal Grandfather          Medications have been verified.        Objective   /79   Pulse 68   Temp (!) 100.8 °F (38.2 °C)   Resp 18   SpO2 99%   No LMP recorded.       Physical Exam     Physical Exam  Vitals and nursing note reviewed.   Constitutional:        General: She is not in acute distress.     Appearance: She is well-developed and normal weight. She is not ill-appearing.   HENT:      Head: Normocephalic.      Right Ear: Tympanic membrane normal.      Left Ear: Tympanic membrane normal.      Mouth/Throat:      Pharynx: Posterior oropharyngeal erythema present.      Tonsils: No tonsillar exudate or tonsillar abscesses. 2+ on the right. 1+ on the left.   Eyes:      Conjunctiva/sclera: Conjunctivae normal.   Cardiovascular:      Rate and Rhythm: Normal rate and regular rhythm.   Pulmonary:      Effort: Pulmonary effort is normal. No respiratory distress.      Breath sounds: Normal breath sounds. No stridor. No wheezing, rhonchi or rales.   Chest:      Chest wall: No tenderness.   Abdominal:      Palpations: Abdomen is soft.   Lymphadenopathy:      Cervical: Cervical adenopathy present.   Neurological:      Mental Status: She is alert.

## 2025-02-11 NOTE — LETTER
February 11, 2025     Patient: Randee Leiva   YOB: 1990   Date of Visit: 2/11/2025       To Whom it May Concern:    Randee Leiva was seen in my clinic on 2/11/2025. She may return to work on 2/13/2025 .    If you have any questions or concerns, please don't hesitate to call.         Sincerely,          CINTHYA Ruiz        CC: No Recipients

## 2025-02-28 ENCOUNTER — OFFICE VISIT (OUTPATIENT)
Dept: PAIN MEDICINE | Facility: CLINIC | Age: 35
End: 2025-02-28
Payer: MEDICARE

## 2025-02-28 VITALS — BODY MASS INDEX: 25.74 KG/M2 | HEIGHT: 67 IN | WEIGHT: 164 LBS

## 2025-02-28 DIAGNOSIS — G89.29 CHRONIC LEFT-SIDED LOW BACK PAIN, UNSPECIFIED WHETHER SCIATICA PRESENT: Primary | ICD-10-CM

## 2025-02-28 DIAGNOSIS — M54.50 CHRONIC LEFT-SIDED LOW BACK PAIN, UNSPECIFIED WHETHER SCIATICA PRESENT: Primary | ICD-10-CM

## 2025-02-28 DIAGNOSIS — M54.16 LUMBAR RADICULOPATHY: ICD-10-CM

## 2025-02-28 PROCEDURE — 99214 OFFICE O/P EST MOD 30 MIN: CPT | Performed by: NURSE PRACTITIONER

## 2025-02-28 NOTE — PROGRESS NOTES
Assessment:  1. Chronic left-sided low back pain, unspecified whether sciatica present    2. Lumbar radiculopathy        Plan:  I will order an updated MRI of the lumbar spine without contrast  Discussed a trial of pregabalin however patient prefers to avoid oral medications at this time if possible  Continue with home exercise program as taught by chiropractic therapy  Follow-up after imaging or sooner if needed    History of Present Illness:    The patient is a 34 y.o. female last seen on 6/6/2024 who presents for a follow up office visit in regards to chronicleft-sided low back pain that radiates to the left hip posterior aspect of the left lower extremity to the plantar aspect of the left foot with associated paresthesias.  She currently denies right-sided symptoms.  Patient has completed chiropractic therapy from July 1, 2024 through November 15, 2024 with some transient relief..  EMG of the left lower extremity in 2023 was unremarkable.  Last MRI lumbar spine from 2022 showed a mild disc bulge at L4-5 without any compressive pathology otherwise unremarkable.  She has not found relief with NSAIDs or Tylenol.  She has not been able to tolerate the side effects of gabapentin or muscle relaxants.  She has had relief with a left SI joint injection however this only improved her low back pain and did not improve her lower extremity symptoms.     The patient rates her pain a 10 out of 10 on the numeric pain rating scale.  Pain is constant and described as burning, sharp, throbbing, pressure-like, numbness and pins-and-needles    I have personally reviewed and/or updated the patient's past medical history, past surgical history, family history, social history, current medications, allergies, and vital signs today.       Review of Systems:    Review of Systems      Past Medical History:   Diagnosis Date    Acne     Allergic     Anxiety     Asthma     Depression     GERD (gastroesophageal reflux disease)      "Hypertension        No past surgical history on file.    Family History   Problem Relation Age of Onset    Cancer Father         non hodgkins lymphoma    Cancer Paternal Grandmother     Cancer Paternal Grandfather        Social History     Occupational History    Occupation: NOT EMPLOYED   Tobacco Use    Smoking status: Heavy Smoker     Current packs/day: 0.50     Average packs/day: 0.5 packs/day for 1.2 years (0.6 ttl pk-yrs)     Types: Cigarettes     Passive exposure: Current    Smokeless tobacco: Current    Tobacco comments:     8 cigarettes per day, No passive smoke exposure   Vaping Use    Vaping status: Never Used   Substance and Sexual Activity    Alcohol use: Not Currently    Drug use: Never    Sexual activity: Yes     Partners: Male         Current Outpatient Medications:     albuterol (PROVENTIL HFA,VENTOLIN HFA) 90 mcg/act inhaler, Inhale 2 puffs every 6 (six) hours as needed for shortness of breath, Disp: 18 g, Rfl: 0    nicotine (NICODERM CQ) 21 mg/24 hr TD 24 hr patch, Place 1 patch on the skin over 24 hours every 24 hours (Patient not taking: Reported on 10/15/2024), Disp: 14 patch, Rfl: 2    Allergies   Allergen Reactions    No Known Allergies        Physical Exam:    Ht 5' 7\" (1.702 m)   Wt 74.4 kg (164 lb)   BMI 25.69 kg/m²     Constitutional:normal, well developed, well nourished, alert, in no distress and non-toxic and no overt pain behavior.  Eyes:anicteric  HEENT:grossly intact  Neck:supple, symmetric, trachea midline and no masses   Pulmonary:even and unlabored  Cardiovascular:No edema or pitting edema present  Skin:Normal without rashes or lesions and well hydrated  Psychiatric:Mood and affect appropriate  Neurologic:Cranial Nerves II-XII grossly intact  Musculoskeletal:normal gait.  Bilateral lower extremity strength 5 out of 5 in all muscle groups.  Sensation diminished to light touch in the S1 distribution of the left lower extremity in comparison to the right.  Internal and external " rotation of the left hip reproduce groin pain.  Negative Haseeb's test bilaterally.  Negative straight leg raise bilaterally      Imaging  MRI lumbar spine wo contrast    (Results Pending)   MRI LUMBAR SPINE WITHOUT CONTRAST     INDICATION: R20.2: Paresthesia of skin.   Right-sided weakness.  History of fall.     COMPARISON:  None.     TECHNIQUE:  Sagittal T1, sagittal T2, sagittal inversion recovery, axial T1 and axial T2, coronal T2.    IMAGE QUALITY:  Diagnostic     FINDINGS:     VERTEBRAL BODIES:  There is a transitional lumbosacral junction.  The L5 vertebral body has an elongated left transverse process articulating with the sacral base.  Normal alignment of the lumbar spine.  No spondylolysis or spondylolisthesis. No   scoliosis.  No compression fracture.    Normal marrow signal is identified within the visualized bony structures.  No discrete marrow lesion.     SACRUM:  Normal signal within the sacrum. No evidence of insufficiency or stress fracture.     DISTAL CORD AND CONUS:  Normal size and signal within the distal cord and conus.     PARASPINAL SOFT TISSUES:  Paraspinal soft tissues are unremarkable.     LOWER THORACIC DISC SPACES:  Normal disc height and signal.  No disc herniation, canal stenosis or foraminal narrowing.     LUMBAR DISC SPACES:     L1-L2:  Normal.     L2-L3:  Normal.     L3-L4:  Normal.     L4-L5:  Minimal annular bulge without saline central or foraminal narrowing.     L5-S1:  Normal.     IMPRESSION:     No central or foraminal narrowing.  Minimal annular bulge at L4-5.      Orders Placed This Encounter   Procedures    MRI lumbar spine wo contrast

## 2025-03-27 ENCOUNTER — TELEPHONE (OUTPATIENT)
Age: 35
End: 2025-03-27

## 2025-03-27 ENCOUNTER — OFFICE VISIT (OUTPATIENT)
Age: 35
End: 2025-03-27
Payer: MEDICARE

## 2025-03-27 ENCOUNTER — HOSPITAL ENCOUNTER (OUTPATIENT)
Dept: RADIOLOGY | Facility: IMAGING CENTER | Age: 35
End: 2025-03-27
Payer: MEDICARE

## 2025-03-27 VITALS
OXYGEN SATURATION: 99 % | HEIGHT: 67 IN | WEIGHT: 157 LBS | SYSTOLIC BLOOD PRESSURE: 114 MMHG | DIASTOLIC BLOOD PRESSURE: 72 MMHG | HEART RATE: 79 BPM | BODY MASS INDEX: 24.64 KG/M2 | TEMPERATURE: 97.7 F

## 2025-03-27 DIAGNOSIS — M54.16 LUMBAR RADICULOPATHY: ICD-10-CM

## 2025-03-27 DIAGNOSIS — K59.00 CONSTIPATION, UNSPECIFIED CONSTIPATION TYPE: ICD-10-CM

## 2025-03-27 DIAGNOSIS — R13.19 ESOPHAGEAL DYSPHAGIA: ICD-10-CM

## 2025-03-27 DIAGNOSIS — K62.5 RECTAL BLEEDING: ICD-10-CM

## 2025-03-27 DIAGNOSIS — K21.9 GASTROESOPHAGEAL REFLUX DISEASE, UNSPECIFIED WHETHER ESOPHAGITIS PRESENT: Primary | ICD-10-CM

## 2025-03-27 PROCEDURE — 72148 MRI LUMBAR SPINE W/O DYE: CPT

## 2025-03-27 PROCEDURE — 99244 OFF/OP CNSLTJ NEW/EST MOD 40: CPT | Performed by: DIETITIAN, REGISTERED

## 2025-03-27 RX ORDER — SODIUM CHLORIDE, SODIUM LACTATE, POTASSIUM CHLORIDE, CALCIUM CHLORIDE 600; 310; 30; 20 MG/100ML; MG/100ML; MG/100ML; MG/100ML
125 INJECTION, SOLUTION INTRAVENOUS CONTINUOUS
OUTPATIENT
Start: 2025-03-27

## 2025-03-27 NOTE — PROGRESS NOTES
Name: Randee Leiva      : 1990      MRN: [id number]  Encounter Provider: Aldo Hodge PA-C  Encounter Date: 3/27/2025   Encounter department: [facility]  :  Assessment & Plan  Constipation, unspecified constipation type  Patient reports history of abdominal pain and constipation since her teenage years.  When she was a teenager, she suffered from anorexia and abused laxatives.  Ever since then, she has had chronic constipation.  She typically has 1 small BM daily but with incomplete emptying, requires physical maneuvers to have a bowel movement, and with significant generalized abdominal pain and bloating.  She has had about 2-3 episodes of rectal bleeding in the last few years, one of which was larger volume blood seen in the toilet bowl.  She follows a vegetarian diet that is very high in fiber and has recently also started drinking Metamucil once daily.  She drinks > 64 ounces water daily.  Celiac disease antibody profile was negative in .  Orders:  •  CBC; Future  •  Comprehensive metabolic panel; Future  •  C-reactive protein; Future  •  TSH, 3rd generation with Free T4 reflex; Future  -Check updated CBC, CMP, CRP, and TSH.  -Continue with excellent water intake.  -Recommend aiming for about 25 g of fiber daily.  Discussed that by following a vegetarian diet, patient may have significantly higher fiber intake which could potentially contribute to some constipation.  -Discontinue Metamucil.  -Start MiraLAX 1 capful once daily.  Advised patient to titrate dosing as needed.  -If MiraLAX is not effective, can consider medication such as Linzess, Amitiza, or Trulance.  -Given history of rectal bleeding, I also recommend a colonoscopy for further evaluation.  I discussed informed consent with the patient. The risks/benefits/alternatives of the procedure were discussed with the patient. Risks included, but not limited to, infection, bleeding,  perforation, injury to organs in the abdomen, missed lesion and incomplete procedure were discussed. Patient was agreeable.   -Follow-up in 3 months.  Rectal bleeding    Orders:  •  Colonoscopy; Future    Gastroesophageal reflux disease, unspecified whether esophagitis present  Patient also reports a history of acid reflux that started about 2 years ago with associated difficulty swallowing with sensation of food getting stuck in her throat.  This dysphagia usually resolves by drinking some water.  She denies any regurgitation.  She denies any heartburn, nausea, vomiting.  Acid reflux has significantly improved since she stopped eating late at night, and is not recently bothering her.  She does still have dysphagia at times.  She smokes cigarettes daily, but would like to quit.  There is no family history of esophageal cancer or CRC.  Orders:  •  Ambulatory Referral to Gastroenterology  •  EGD; Future  -Continue dietary/lifestyle management of GERD.  -Encouraged smoking cessation.  -Recommend EGD for further evaluation of esophageal dysphagia.  -Trial Pepcid as needed for acid reflux.  Esophageal dysphagia    Orders:  •  EGD; Future        History of Present Illness   Randee Leiva is a 34 y.o. female with history of GERD, migraines, chronic back pain, anxiety, and tobacco use who presents for evaluation of GERD, abdominal pain, and constipation.    Patient reports history of abdominal pain and constipation since her teenage years.  When she was a teenager, she suffered from anorexia and abused laxatives.  Ever since then, she has had chronic constipation.  She typically has 1 small BM daily but with incomplete emptying, requires physical maneuvers to have a bowel movement, and with significant generalized abdominal pain and bloating.  She has had about 2-3 episodes of rectal bleeding in the last few years, one of which was larger volume blood seen in the toilet bowl.  She follows a vegetarian diet that is very  "high in fiber and has recently also started drinking Metamucil once daily.  She drinks > 64 ounces water daily.      Patient also reports a history of acid reflux that started about 2 years ago with associated difficulty swallowing with sensation of food getting stuck in her throat.  This dysphagia usually resolves by drinking some water.  She denies any regurgitation.  She denies any heartburn, nausea, vomiting.  Acid reflux has significantly improved since she stopped eating late at night, and is not recently bothering her.  She does still have dysphagia at times.    She smokes cigarettes daily, but would like to quit.  There is no family history of esophageal cancer or CRC.    HPI    Review of Systems A complete review of systems is negative other than that noted above in the HPI.      Current Outpatient Medications   Medication Sig Dispense Refill   • albuterol (PROVENTIL HFA,VENTOLIN HFA) 90 mcg/act inhaler Inhale 2 puffs every 6 (six) hours as needed for shortness of breath 18 g 0   • psyllium (METAMUCIL) 58.6 % powder Take 1 packet by mouth daily     • nicotine (NICODERM CQ) 21 mg/24 hr TD 24 hr patch Place 1 patch on the skin over 24 hours every 24 hours (Patient not taking: Reported on 10/15/2024) 14 patch 2     No current facility-administered medications for this visit.     Objective   /72 (BP Location: Right arm, Patient Position: Sitting, Cuff Size: Adult)   Pulse 79   Temp 97.7 °F (36.5 °C) (Tympanic)   Ht 5' 7\" (1.702 m)   Wt 71.2 kg (157 lb)   SpO2 99%   BMI 24.59 kg/m²     Physical Exam  Vitals reviewed.   Constitutional:       Appearance: Normal appearance.   HENT:      Head: Normocephalic and atraumatic.   Eyes:      Conjunctiva/sclera: Conjunctivae normal.   Pulmonary:      Effort: Pulmonary effort is normal.   Skin:     Coloration: Skin is not jaundiced or pale.   Neurological:      Mental Status: She is alert.   Psychiatric:         Mood and Affect: Mood normal.         Behavior: " Behavior normal.          Lab Results: I personally reviewed relevant lab results.

## 2025-03-27 NOTE — TELEPHONE ENCOUNTER
Procedure: EGD/Colonoscopy  Date: 07/29/2025  Physician performing: Dr. Brewer  Location of procedure:  [facility]  Instructions given to patient: Yes  Diabetic: No  Clearances: NA

## 2025-03-27 NOTE — PATIENT INSTRUCTIONS
Randee Leiva  3/27/2025     Recommended Total Fiber Intake**    AGE  MEN  WOMAN    19-50  38 grams/day  25 grams/day    Over 50  30 grams/day  21 grams/day    Fiber Sources in Common Foods   Use this guide to find out if you have enough fiber in you diet.    Food  Size of Serving  Fiber Grams/Servings  Calories/   Serving  Food  Size of Serving  Fiber Grams/Servings  Calories/   Serving    Fruits: (raw unless otherwise noted  Vegetables: (cooked, unless otherwise noted)    Apple (w/peel)  1 medium  3.7  81  Artichoke  1 globe  6.5  60    Apricots  1 cup  3.7  74  Asparagus  ½ cup  1.8  25    Banana  1 medium  2.7  105  Beans:    Blackberries  1 cup  7.2  75  Green (canned)  ½ cup  1.3  14    Blueberries  1 cup  3.9  81  Kidney  ½ cup  5.7  114    Cantaloupe  1 cup  1.3  56  Lima  ½ cup  6.1  85    Grapefruit  1 medium  2.8  82  Horta  ½ cup  7.4  118    Grapes  1 cup  1.6  114  White  ½ cup  5.5  122    Orange  1 medium  3.1  62  Beets  ½ cup  1.6  37    Pear (with peel)  1 medium  4.0  98  Broccoli  ½ cup  2.8  26    Pineapple  1 cup  1.9  76  Cabbage, green  ½ cup  2.1  16    Plums  1 medium  1.0  36  Cabbage, green (raw)  ½ cup  0.8  9    Prunes (dried)  1 cup  11.4  386  Carrots  ½ cup  2.6  35    Raspberries  1 cup  8.4  60  Cauliflower  ½ cup  2.0  17    Strawberries  1 cup  3.4  45  Cauliflower (raw)  ½ cup  1.3  13    Watermelon  1 slice  0.8  51  Celery (raw)  ½ cup  1.0  10    GRAIN PRODUCTS AND OTHERS:  Corn  ½ cup  2.0  66    Bread:  Cucumber (raw)  ½ cup  0.4  7    Tajik  1 slice  0.8  68  Eggplant  ½ cup  1.2  13    Rye  1 slice  1.6  67  Green Peas  ½ cup  4.4  62    White  1 slice  0.6  67  Lettuce, iceberg (raw)  ½ cup  0.4  4    Whole Wheat  1 slice  2.0  70  Onions (raw)  ½ cup  1.4  30    Cereal:  Potato (baked with skin)  ½ cup  1.5  66    Bran  1 ounce  9.7  70  Spinach  ½ cup  2.7  25    Corn Flakes  1 ounce  1.0  110  Tomato  ½ cup  1.0  19    Oat Bran  1 ounce  4.3  69  Zucchini  ½  cup  1.3  14    Oatmeal  1 ounce  3.0  109  METAMUCIL:    Shredded Wheat  1 ounce  2.8  102  Capsules  6 capsules  3.0  10    Crackers:  Smooth Texture Orange (sugar free)  1 tsp  3.0  20    Yoshi  1 square  0.1  27  Smooth Texture Orange (with sugar)  1 tbsp  3.0  45    Saltine  1 regular  0.1  13  Wafers  2 wafers  3.0  120    Rice:    Brown  ½ cup  1.8  108    White  ½ cup  0.3  103    Spaghetti  2 ounces  2.1  225    Almonds (roasted)  ½ cup  6.4  351    Peanuts (roasted)  ½ cup  6.1  388      ** [place] [employer], The [employer] Academy of Sciences, 2002   Track your fiber intake for five days. Use the Fiber Source Guide to find out how much fiber is in common food.     If you’re not getting your recommended amount of fiber each day, talk to your doctor about how you can increase the fiber in your diet. Example  Monday Tuesday Wednesday Thursday Friday    Food  Oatmeal    Fiber Grams  2.8    Food  Blueberries    Fiber Grams  3.9    Food  W.W. Bread    Fiber Grams  1.9    Food  W.W. Bread    Fiber Grams  1.9    Food  Apple    Fiber Grams  3.7    Food  Spaghetti    Fiber Grams  .14    Food  Corn    Fiber Grams  2.0    Food  White Bread    Fiber Grams  .6    Food    Fiber Grams    Food    Fiber Grams    Food    Fiber Grams    Food    Fiber Grams    Food    Fiber Grams    Food    Fiber Grams    Food    Fiber Grams    Food    Fiber Grams    Food    Fiber Grams    Food    Fiber Grams    Food    Fiber Grams    Food    Fiber Grams    Add numbers in each column to find your daily fiber intake.    Total Daily Fiber Intake  18.2      Too Low - Like most Americans, this example is not enough fiber. Talk to your doctor about how to add fiber to your diet.   Quick Fiber Facts    Most Americans consume only about half of the recommended fiber they need each day.    Fiber helps maintain normal bowel function, and helps prevent constipation and its potential complications. Straining and pressure from constipation may  lead to diverticular disease and hemorrhoids.    Stool softeners or stimulant laxatives only offer short-term relief of constipation, while dietary changes or fiber therapies help break the cycle of irregularity.    Diets low in saturated fat and cholesterol that include 7 grams of soluble fiber per day from psyllium husk, as in Metamucil, may reduce the risk of heart disease by lowering cholesterol. One adult dose of Metamucil has 2.4 grams of this soluable fiber.    Increase fiber intake gradually, giving the body time to adjust.

## 2025-03-27 NOTE — ASSESSMENT & PLAN NOTE
Patient also reports a history of acid reflux that started about 2 years ago with associated difficulty swallowing with sensation of food getting stuck in her throat.  This dysphagia usually resolves by drinking some water.  She denies any regurgitation.  She denies any heartburn, nausea, vomiting.  Acid reflux has significantly improved since she stopped eating late at night, and is not recently bothering her.  She does still have dysphagia at times.  She smokes cigarettes daily, but would like to quit.  There is no family history of esophageal cancer or CRC.  Orders:  •  Ambulatory Referral to Gastroenterology  •  EGD; Future  -Continue dietary/lifestyle management of GERD.  -Encouraged smoking cessation.  -Recommend EGD for further evaluation of esophageal dysphagia.  -Trial Pepcid as needed for acid reflux.

## 2025-04-04 ENCOUNTER — TELEPHONE (OUTPATIENT)
Age: 35
End: 2025-04-04

## 2025-04-04 DIAGNOSIS — M54.16 LUMBAR RADICULITIS: Primary | ICD-10-CM

## 2025-04-04 RX ORDER — METHYLPREDNISOLONE 4 MG/1
TABLET ORAL
Qty: 1 EACH | Refills: 0 | Status: SHIPPED | OUTPATIENT
Start: 2025-04-04

## 2025-04-04 NOTE — TELEPHONE ENCOUNTER
S/w pt and scheduled f/u with JW as per request in July. Pt requests cancellation appt if available.    Pt had lumbar MRI done and requests JW review. Pt reports left hip and leg pain that is pinching and has left foot numbness. Questioned cause of symptoms.  Pt takes Advil without relief, Gabapentin has not worked. Pt does not want to take meds but questioned if a sterid pack would be possible to help until seen?  Please advise  Last OVS Feb with KH    Ok to leave message.

## 2025-04-04 NOTE — TELEPHONE ENCOUNTER
Caller: Randee     Doctor: Dr. Ragsdale     Reason for call: pt has to cancel todays appointment due to having to go into work earlier. Her pain level is 10/10. No available appointments until July she can only do Friday's. Is there anywhere we can put her in earlier. Please advise.    Call back#: [phone]

## 2025-04-04 NOTE — TELEPHONE ENCOUNTER
Attempted to contact pt. Detailed VMMLOM(verbal consent)   and advised of same.     Requested cb if would like to schedule procedure.    Advised medrol dose pack sent to [facility] pharmacy and pt should avoid NSAIDS while taking dose pack and to cb with any questions or concerns.  Provided with cb# and [place]

## 2025-04-04 NOTE — TELEPHONE ENCOUNTER
MRI of the lumbar spine shows small disc bulge with minimal foraminal stenosis at L4-5.  I can offer the patient a left L4 TFESI for diagnostic and therapeutic purposes.  Medrol Dosepak sent to pharmacy

## 2025-06-11 ENCOUNTER — TELEPHONE (OUTPATIENT)
Dept: NEUROLOGY | Facility: CLINIC | Age: 35
End: 2025-06-11

## 2025-06-11 NOTE — TELEPHONE ENCOUNTER
LMOM to r/s pt's appt w/ Kenrick in [facility] due to provider leaving dept. Please offer OVL with  in [facility].

## 2025-06-16 NOTE — TELEPHONE ENCOUNTER
LMOM to r/s pt's appt w/ Kenrick in [facility] due to provider leaving dept. Please offer OVL with  in [facility].

## 2025-06-17 NOTE — TELEPHONE ENCOUNTER
3rd attempt to reach pt.   LMOM to r/s pt's appt w/ [facility] in CV due to provider leaving dept. Please offer OVL with  in CV.        Cancelled pt's appt and sent letter.

## 2025-07-01 PROBLEM — K59.09 OTHER CONSTIPATION: Status: ACTIVE | Noted: 2025-07-01

## 2025-07-08 ENCOUNTER — OFFICE VISIT (OUTPATIENT)
Age: 35
End: 2025-07-08
Payer: MEDICARE

## 2025-07-08 VITALS
DIASTOLIC BLOOD PRESSURE: 80 MMHG | HEART RATE: 80 BPM | SYSTOLIC BLOOD PRESSURE: 110 MMHG | WEIGHT: 160 LBS | OXYGEN SATURATION: 97 % | RESPIRATION RATE: 18 BRPM | TEMPERATURE: 97.5 F | HEIGHT: 67 IN | BODY MASS INDEX: 25.11 KG/M2

## 2025-07-08 DIAGNOSIS — M25.552 LEFT HIP PAIN: Primary | ICD-10-CM

## 2025-07-08 PROCEDURE — 99213 OFFICE O/P EST LOW 20 MIN: CPT | Performed by: PHYSICIAN ASSISTANT

## 2025-07-08 NOTE — PROGRESS NOTES
"Name: Randee Leiva      : 1990      MRN: [id number]  Encounter Provider: Christy Lu PA-C  Encounter Date: 2025   Encounter department: [facility] PRIMARY [facility]ARE  :  Assessment & Plan  Left hip pain  -X-ray ordered of left hip  - I did recommend she get this done prior to her follow-up pain management appointment on   Orders:    XR hip/pelv 2-3 vws left if performed; Future    M*Modal software was used to dictate this note. It may contain errors with dictating incorrect words/spelling. Please contact provider directly for any questions.          History of Present Illness   Patient presents today for routine follow-up.    She states that she continues with GI issues but she has not had a chance to do a colonoscopy  She does have a follow-up with pain management on Friday, .  She states that she did get an injection in her spine last year which seemed to help but now her pain has changed and she is noticing some discomfort into the anterior hip region.      Review of Systems    Objective   /80   Pulse 80   Temp 97.5 °F (36.4 °C)   Resp 18   Ht 5' 7\" (1.702 m)   Wt 72.6 kg (160 lb)   LMP 2025 (Approximate)   SpO2 97%   BMI 25.06 kg/m²      Physical Exam  Vitals reviewed.   Constitutional:       General: She is not in acute distress.     Appearance: Normal appearance. She is not ill-appearing, toxic-appearing or diaphoretic.     Musculoskeletal:      Comments: Left hip: She does have good range of motion but she does notice pain with flexion.     Neurological:      Mental Status: She is alert.         "

## 2025-07-11 ENCOUNTER — APPOINTMENT (OUTPATIENT)
Dept: LAB | Age: 35
End: 2025-07-11
Payer: MEDICARE

## 2025-07-11 ENCOUNTER — APPOINTMENT (OUTPATIENT)
Dept: RADIOLOGY | Age: 35
End: 2025-07-11
Payer: MEDICARE

## 2025-07-11 ENCOUNTER — OFFICE VISIT (OUTPATIENT)
Dept: PAIN MEDICINE | Facility: CLINIC | Age: 35
End: 2025-07-11
Payer: MEDICARE

## 2025-07-11 VITALS — WEIGHT: 159 LBS | HEIGHT: 67 IN | BODY MASS INDEX: 24.96 KG/M2

## 2025-07-11 DIAGNOSIS — M54.16 LUMBAR RADICULITIS: Primary | ICD-10-CM

## 2025-07-11 DIAGNOSIS — M54.40 LOW BACK PAIN WITH SCIATICA, SCIATICA LATERALITY UNSPECIFIED, UNSPECIFIED BACK PAIN LATERALITY, UNSPECIFIED CHRONICITY: ICD-10-CM

## 2025-07-11 DIAGNOSIS — M25.552 LEFT HIP PAIN: ICD-10-CM

## 2025-07-11 DIAGNOSIS — K59.00 CONSTIPATION, UNSPECIFIED CONSTIPATION TYPE: ICD-10-CM

## 2025-07-11 LAB
ALBUMIN SERPL BCG-MCNC: 4.4 G/DL (ref 3.5–5)
ALP SERPL-CCNC: 33 U/L (ref 34–104)
ALT SERPL W P-5'-P-CCNC: 12 U/L (ref 7–52)
ANION GAP SERPL CALCULATED.3IONS-SCNC: 8 MMOL/L (ref 4–13)
AST SERPL W P-5'-P-CCNC: 14 U/L (ref 13–39)
BILIRUB SERPL-MCNC: 0.32 MG/DL (ref 0.2–1)
BUN SERPL-MCNC: 11 MG/DL (ref 5–25)
CALCIUM SERPL-MCNC: 9.1 MG/DL (ref 8.4–10.2)
CHLORIDE SERPL-SCNC: 106 MMOL/L (ref 96–108)
CO2 SERPL-SCNC: 25 MMOL/L (ref 21–32)
CREAT SERPL-MCNC: 0.66 MG/DL (ref 0.6–1.3)
CRP SERPL QL: <1 MG/L
ERYTHROCYTE [DISTWIDTH] IN BLOOD BY AUTOMATED COUNT: 13 % (ref 11.6–15.1)
GFR SERPL CREATININE-BSD FRML MDRD: 116 ML/MIN/1.73SQ M
GLUCOSE SERPL-MCNC: 86 MG/DL (ref 65–140)
HCT VFR BLD AUTO: 42.1 % (ref 34.8–46.1)
HGB BLD-MCNC: 13.9 G/DL (ref 11.5–15.4)
MCH RBC QN AUTO: 30.2 PG (ref 26.8–34.3)
MCHC RBC AUTO-ENTMCNC: 33 G/DL (ref 31.4–37.4)
MCV RBC AUTO: 91 FL (ref 82–98)
PLATELET # BLD AUTO: 182 THOUSANDS/UL (ref 149–390)
PMV BLD AUTO: 13.2 FL (ref 8.9–12.7)
POTASSIUM SERPL-SCNC: 4.3 MMOL/L (ref 3.5–5.3)
PROT SERPL-MCNC: 6.7 G/DL (ref 6.4–8.4)
RBC # BLD AUTO: 4.61 MILLION/UL (ref 3.81–5.12)
SODIUM SERPL-SCNC: 139 MMOL/L (ref 135–147)
TSH SERPL DL<=0.05 MIU/L-ACNC: 1.29 UIU/ML (ref 0.45–4.5)
WBC # BLD AUTO: 6.69 THOUSAND/UL (ref 4.31–10.16)

## 2025-07-11 PROCEDURE — 99214 OFFICE O/P EST MOD 30 MIN: CPT | Performed by: ANESTHESIOLOGY

## 2025-07-11 PROCEDURE — 36415 COLL VENOUS BLD VENIPUNCTURE: CPT

## 2025-07-11 PROCEDURE — 86140 C-REACTIVE PROTEIN: CPT

## 2025-07-11 PROCEDURE — 84443 ASSAY THYROID STIM HORMONE: CPT

## 2025-07-11 PROCEDURE — 85027 COMPLETE CBC AUTOMATED: CPT

## 2025-07-11 PROCEDURE — 80053 COMPREHEN METABOLIC PANEL: CPT

## 2025-07-11 PROCEDURE — 73502 X-RAY EXAM HIP UNI 2-3 VIEWS: CPT

## 2025-07-11 NOTE — PROGRESS NOTES
Name: Randee Leiva      : 1990      MRN: [id number]  Encounter Provider: Tristan Ragsdale DO  Encounter Date: 2025   [facility]  :  Assessment & Plan  Lumbar radiculitis         Low back pain with sciatica, sciatica laterality unspecified, unspecified back pain laterality, unspecified chronicity         Left hip pain       34-year-old female returning for follow-up of lumbosacral back pain that radiates into the posteromedial aspect of the left lower extremity to the foot with right lower extremity weakness.  Pain also radiates into the left groin.  X-ray of the left hip was ordered, but not done yet.  MRI of the lumbar spine shows minimal foraminal stenosis at L4-5 secondary to disc bulge.  EMG of the left lower extremity was unremarkable.  MRI of the brain and the cervical spine was unremarkable.  The patient does appear to have some contribution stemming from hip pathology as she does have a positive hip exam.  Encouraged patient to proceed with x-ray of the left hip.  Lower extremity symptoms could potentially be radicular in nature.  She has tried and failed physical therapy and numerous medication options.  Prefers to avoid pregabalin.    1.  Patient will proceed with x-ray of the left hip  2.  May consider bilateral L4 TFESI in the future  3.  Patient will continue with HEP  4.  I will follow-up with the patient pending results of x-ray        My impressions and treatment recommendations were discussed in detail with the patient who verbalized understanding and had no further questions.  Discharge instructions were provided. I personally saw and examined the patient and I agree with the above discussed plan of care.    History of Present Illness     Randee Leiva is a 34 y.o. female returning for follow-up of lumbosacral back pain that radiates into the posteromedial aspect of the left lower extremity to the foot with right lower extremity  weakness.  Pain also radiates into the left groin.  She denies any bladder or bowel incontinence or saddle anesthesia.  X-ray of the left hip was ordered, but not done yet.  MRI of the lumbar spine shows minimal foraminal stenosis at L4-5 secondary to disc bulge.  EMG of the left lower extremity was unremarkable.  MRI of the brain and the cervical spine was unremarkable.  The patient rates her pain a 10 out of 10 and the pain does not follow any particular pattern throughout the day.  The pain is described as sharp and shooting.  The pain is increased with standing, walking, bending, and exercise.  She denies any specific alleviating factors.    Other than as stated above, the patient denies any interval changes in medications, medical condition, mental condition, symptoms, or allergies since the last office visit.    Review of Systems   Musculoskeletal:  Positive for arthralgias and back pain.   All other systems reviewed and are negative.    Medical History Reviewed by provider this encounter:  Tobacco  Allergies  Meds  Problems  Med Hx  Surg Hx  Fam Hx     .  Pertinent Medical History   Anxiety hypertension        Medical History Reviewed by provider this encounter:  Tobacco  Allergies  Meds  Problems  Med Hx  Surg Hx  Fam Hx     .  Past Medical History   Past Medical History[1]  Past Surgical History[2]  Family History[3]   reports that she has been smoking cigarettes. She has a 2.5 pack-year smoking history. She has been exposed to tobacco smoke. She has never used smokeless tobacco. She reports that she does not currently use alcohol. She reports that she does not use drugs.  Current Outpatient Medications   Medication Instructions    albuterol (PROVENTIL HFA,VENTOLIN HFA) 90 mcg/act inhaler 2 puffs, Inhalation, Every 6 hours PRN    methylPREDNISolone 4 MG tablet therapy pack Use as directed on package    nicotine (NICODERM CQ) 21 mg/24 hr TD 24 hr patch 1 patch, Transdermal, Every 24 hours     "psyllium (METAMUCIL) 58.6 % powder 1 packet, Daily   Allergies[4]   Medications Ordered Prior to Encounter[5]   Social History[6]     Objective   Ht 5' 7\" (1.702 m)   Wt 72.1 kg (159 lb)   LMP 06/24/2025 (Approximate)   BMI 24.90 kg/m²      Pain Score: 10-Worst pain ever  Physical Exam  Constitutional: normal, well developed, well nourished, alert, in no distress and non-toxic and no overt pain behavior.  Eyes: anicteric  HEENT: grossly intact  Neck: supple, symmetric, trachea midline and no masses   Pulmonary: even and unlabored  Cardiovascular: No edema or pitting edema present  Skin: Normal without rashes or lesions and well hydrated  Psychiatric: Mood and affect appropriate  Neurologic: Cranial Nerves II-XII grossly intact  Musculoskeletal: normal gait.  Bilateral lumbar paraspinals and SI joints nontender to palpation.  Bilateral lower extremity strength 5 out of 5 in all muscle groups.  Sensation intact to light touch in L3-S1 dermatomes bilaterally.  Negative straight leg raise bilaterally.  Negative Haseeb's test bilaterally.  Left groin pain reproduced with internal and external rotation of the left hip.  Positive Stinchfield test on the left.    Radiology Results Review: I personally reviewed the following image studies in PACS and associated radiology reports: MRI brain and MRI spine. My interpretation of the radiology images/reports is: MRI of the lumbar spine shows minimal foraminal stenosis at L4-5 secondary to disc bulge.  EMG of the left lower extremity was unremarkable.  MRI of the brain and the cervical spine was unremarkable..         [1]   Past Medical History:  Diagnosis Date    Acne     Allergic     Anxiety     Asthma     CTS (carpal tunnel syndrome)     Depression     GERD (gastroesophageal reflux disease)     Headache(784.0)     Headache, tension-type     Hypertension     Migraine    [2] No past surgical history on file.  [3]   Family History  Problem Relation Name Age of Onset    Acne " Mother Mariza & Abimael     Cancer Father Mother and father         non hodgkins lymphoma    Cancer Paternal Grandmother      Cancer Paternal Grandfather     [4]   Allergies  Allergen Reactions    No Known Allergies    [5]   Current Outpatient Medications on File Prior to Visit   Medication Sig Dispense Refill    albuterol (PROVENTIL HFA,VENTOLIN HFA) 90 mcg/act inhaler Inhale 2 puffs every 6 (six) hours as needed for shortness of breath 18 g 0    psyllium (METAMUCIL) 58.6 % powder Take 1 packet by mouth in the morning.      methylPREDNISolone 4 MG tablet therapy pack Use as directed on package (Patient not taking: Reported on 7/11/2025) 1 each 0    nicotine (NICODERM CQ) 21 mg/24 hr TD 24 hr patch Place 1 patch on the skin over 24 hours every 24 hours (Patient not taking: Reported on 10/15/2024) 14 patch 2     No current facility-administered medications on file prior to visit.   [6]   Social History  Tobacco Use    Smoking status: Every Day     Current packs/day: 0.25     Average packs/day: 0.3 packs/day for 8.8 years (2.5 ttl pk-yrs)     Types: Cigarettes     Passive exposure: Current    Smokeless tobacco: Never    Tobacco comments:     8 cigarettes per day, No passive smoke exposure   Vaping Use    Vaping status: Never Used   Substance and Sexual Activity    Alcohol use: Not Currently    Drug use: Never    Sexual activity: Yes     Partners: Male

## 2025-07-11 NOTE — ASSESSMENT & PLAN NOTE
34-year-old female returning for follow-up of lumbosacral back pain that radiates into the posteromedial aspect of the left lower extremity to the foot with right lower extremity weakness.  Pain also radiates into the left groin.  X-ray of the left hip was ordered, but not done yet.  MRI of the lumbar spine shows minimal foraminal stenosis at L4-5 secondary to disc bulge.  EMG of the left lower extremity was unremarkable.  MRI of the brain and the cervical spine was unremarkable.  The patient does appear to have some contribution stemming from hip pathology as she does have a positive hip exam.  Encouraged patient to proceed with x-ray of the left hip.  Lower extremity symptoms could potentially be radicular in nature.  She has tried and failed physical therapy and numerous medication options.  Prefers to avoid pregabalin.    1.  Patient will proceed with x-ray of the left hip  2.  May consider bilateral L4 TFESI in the future  3.  Patient will continue with HEP  4.  I will follow-up with the patient pending results of x-ray

## 2025-07-15 ENCOUNTER — ANESTHESIA (OUTPATIENT)
Dept: ANESTHESIOLOGY | Facility: HOSPITAL | Age: 35
End: 2025-07-15

## 2025-07-15 ENCOUNTER — ANESTHESIA EVENT (OUTPATIENT)
Dept: ANESTHESIOLOGY | Facility: HOSPITAL | Age: 35
End: 2025-07-15

## 2025-07-18 ENCOUNTER — TELEPHONE (OUTPATIENT)
Age: 35
End: 2025-07-18

## 2025-07-18 NOTE — TELEPHONE ENCOUNTER
Confirming Upcoming Procedure: colon/egd on 07/29/2025  Physician performing: Dr Brewer  Location of procedure:  [facility]  Prep: miralax prep     Lvm and call back number. Sent [employer] message